# Patient Record
Sex: MALE | Race: BLACK OR AFRICAN AMERICAN | Employment: UNEMPLOYED | URBAN - METROPOLITAN AREA
[De-identification: names, ages, dates, MRNs, and addresses within clinical notes are randomized per-mention and may not be internally consistent; named-entity substitution may affect disease eponyms.]

---

## 2017-02-26 DIAGNOSIS — H40.9 GLAUCOMA OF BOTH EYES, UNSPECIFIED GLAUCOMA: ICD-10-CM

## 2017-02-27 RX ORDER — LATANOPROST 50 UG/ML
SOLUTION/ DROPS OPHTHALMIC
Qty: 2.5 ML | Refills: 1 | Status: SHIPPED | OUTPATIENT
Start: 2017-02-27 | End: 2017-05-30 | Stop reason: SDUPTHER

## 2017-03-06 ENCOUNTER — HOSPITAL ENCOUNTER (OUTPATIENT)
Dept: LAB | Age: 67
Discharge: HOME | End: 2017-03-06
Payer: MEDICARE

## 2017-03-06 ENCOUNTER — OFFICE VISIT (OUTPATIENT)
Dept: FAMILY MEDICINE CLINIC | Facility: CLINIC | Age: 67
End: 2017-03-06

## 2017-03-06 VITALS
HEART RATE: 89 BPM | HEIGHT: 67 IN | RESPIRATION RATE: 16 BRPM | DIASTOLIC BLOOD PRESSURE: 92 MMHG | OXYGEN SATURATION: 95 % | TEMPERATURE: 98 F | SYSTOLIC BLOOD PRESSURE: 130 MMHG | BODY MASS INDEX: 20.72 KG/M2 | WEIGHT: 132 LBS

## 2017-03-06 DIAGNOSIS — A74.9 CHLAMYDIA INFECTION: ICD-10-CM

## 2017-03-06 DIAGNOSIS — R30.0 DYSURIA: ICD-10-CM

## 2017-03-06 DIAGNOSIS — R30.0 DYSURIA: Primary | ICD-10-CM

## 2017-03-06 LAB
APPEARANCE UR: CLEAR
BACTERIA URNS QL MICRO: ABNORMAL /HPF
BILIRUB UR QL STRIP: NORMAL
BILIRUB UR QL: NEGATIVE
COLOR UR: YELLOW
EPITH CASTS URNS QL MICRO: ABNORMAL /LPF (ref 0–5)
GLUCOSE UR STRIP.AUTO-MCNC: NEGATIVE MG/DL
GLUCOSE UR-MCNC: NEGATIVE MG/DL
HGB UR QL STRIP: ABNORMAL
KETONES P FAST UR STRIP-MCNC: NORMAL MG/DL
KETONES UR QL STRIP.AUTO: 15 MG/DL
LEUKOCYTE ESTERASE UR QL STRIP.AUTO: ABNORMAL
NITRITE UR QL STRIP.AUTO: NEGATIVE
PH UR STRIP: 6 [PH] (ref 4.6–8)
PH UR STRIP: 6 [PH] (ref 5–8)
PROT UR QL STRIP: NEGATIVE MG/DL
PROT UR STRIP-MCNC: NEGATIVE MG/DL
RBC #/AREA URNS HPF: ABNORMAL /HPF (ref 0–5)
SP GR UR REFRACTOMETRY: 1.02 (ref 1–1.03)
SP GR UR STRIP: 1.01 (ref 1–1.03)
UA UROBILINOGEN AMB POC: NORMAL (ref 0.2–1)
URINALYSIS CLARITY POC: CLEAR
URINALYSIS COLOR POC: YELLOW
URINE BLOOD POC: NORMAL
URINE LEUKOCYTES POC: NEGATIVE
URINE NITRITES POC: NEGATIVE
UROBILINOGEN UR QL STRIP.AUTO: 1 EU/DL (ref 0.2–1)
WBC URNS QL MICRO: ABNORMAL /HPF (ref 0–4)

## 2017-03-06 PROCEDURE — 87491 CHLMYD TRACH DNA AMP PROBE: CPT | Performed by: FAMILY MEDICINE

## 2017-03-06 PROCEDURE — 81001 URINALYSIS AUTO W/SCOPE: CPT | Performed by: FAMILY MEDICINE

## 2017-03-06 PROCEDURE — 87086 URINE CULTURE/COLONY COUNT: CPT | Performed by: FAMILY MEDICINE

## 2017-03-06 RX ORDER — CIPROFLOXACIN 500 MG/1
500 TABLET ORAL 2 TIMES DAILY
Qty: 10 TAB | Refills: 0 | Status: SHIPPED | OUTPATIENT
Start: 2017-03-06 | End: 2017-03-08 | Stop reason: ALTCHOICE

## 2017-03-06 NOTE — MR AVS SNAPSHOT
Visit Information Date & Time Provider Department Dept. Phone Encounter #  
 3/6/2017  4:00 PM Wandy Ghotra MD [address] [place] [address] [id number] Follow-up Instructions Return if symptoms worsen or fail to improve. Your Appointments 7/6/2017 11:30 AM  
Office Visit with Wandy Ghotra MD  
[facility] ([facility]) Appt Note: Medicare Wellness    
 14 [address] [address]  
[phone]  
  
   
 [address] [place] [address] [place] Upcoming Health Maintenance Date Due DTaP/Tdap/Td series (1 - Tdap) 10/5/1969 FOBT Q 1 YEAR AGE 50-75 10/5/1998 ZOSTER VACCINE AGE 60> 10/5/2008 GLAUCOMA SCREENING Q2Y 10/5/2013 Pneumococcal 65+ Low/Medium Risk (2 of 2 - PCV13) 3/16/2016 INFLUENZA AGE 9 TO ADULT 8/1/2016 MEDICARE YEARLY EXAM 7/2/2017 Allergies as of 3/6/2017  Review Complete On: 3/6/2017 By: Ella Robb No Known Allergies Current Immunizations  Reviewed on 3/16/2015 Name Date Pneumococcal Polysaccharide (PPSV-23) 3/16/2015 11:03 AM  
  
 Not reviewed this visit You Were Diagnosed With   
  
 Codes Comments Dysuria    -  Primary ICD-10-CM: R30.0 ICD-9-CM: 123. 1 Vitals BP Pulse Temp Resp Height(growth percentile) Weight(growth percentile) (!) 130/92 89 98 °F (36.7 °C) 16 5' 7\" (1.702 m) 132 lb (59.9 kg) SpO2 BMI Smoking Status 95% 20.67 kg/m2 Former Smoker Vitals History BMI and BSA Data Body Mass Index Body Surface Area  
 20.67 kg/m 2 1.68 m 2 Preferred Pharmacy Pharmacy Name Phone [id number]. Odilon Singh, [address] [facility] [phone] Your Updated Medication List  
  
   
This list is accurate as of: 3/6/17  4:04 PM.  Always use your most recent med list.  
  
  
  
  
 acetaminophen 650 mg CR tablet Commonly known as:  TYLENOL Take 1 Tab by mouth three (3) times daily. carBAMazepine  mg SR tablet Commonly known as:  TEGretol XR Take 1 Tab by mouth two (2) times a day. ciprofloxacin HCl 500 mg tablet Commonly known as:  CIPRO Take 1 Tab by mouth two (2) times a day for 5 days. latanoprost 0.005 % ophthalmic solution Commonly known as:  XALATAN  
place 1 drop into both eyes NIGHTLY  
  
 pravastatin 40 mg tablet Commonly known as:  PRAVACHOL Take 1 Tab by mouth nightly. Prescriptions Sent to Pharmacy Refills  
 ciprofloxacin HCl (CIPRO) 500 mg tablet 0 Sig: Take 1 Tab by mouth two (2) times a day for 5 days. Class: Normal  
 Pharmacy: [place] [id number]. Boocarol Bude, [address] [facility] #: [phone] Route: Oral  
  
We Performed the Following AMB POC URINALYSIS DIP STICK AUTO W/O MICRO [82612 CPT(R)] Follow-up Instructions Return if symptoms worsen or fail to improve. To-Do List   
 03/06/2017 Lab:  CHLAMYDIA/NEISSERIA AMPLIFICATION   
  
 03/06/2017 Microbiology:  CULTURE, URINE   
  
 03/06/2017 Lab:  URINALYSIS W/ RFLX MICROSCOPIC Patient Instructions Painful Urination (Dysuria): Care Instructions Your Care Instructions Burning pain with urination (dysuria) is a common symptom of a urinary tract infection or other urinary problems. The bladder may become inflamed. This can cause pain when the bladder fills and empties. You may also feel pain if the tube that carries urine from the bladder to the outside of the body (urethra) gets irritated or infected. Sexually transmitted infections (STIs) also may cause pain when you urinate. Sometimes the pain can be caused by things other than an infection. The urethra can be irritated by soaps, perfumes, or foreign objects in the urethra. Kidney stones can cause pain when they pass through the urethra. The cause may be hard to find. You may need tests. Treatment for painful urination depends on the cause. Follow-up care is a key part of your treatment and safety. Be sure to make and go to all appointments, and call your doctor if you are having problems. It's also a good idea to know your test results and keep a list of the medicines you take. How can you care for yourself at home? · Drink extra water and juices such as cranberry and blueberry juices for the next day or two. This will help make the urine less concentrated. And it may help wash out any bacteria that may be causing an infection. (If you have kidney, heart, or liver disease and have to limit fluids, talk with your doctor before you increase the amount of fluids you drink.) · Avoid drinks that are carbonated or have caffeine. They can irritate the bladder. · Urinate often. Try to empty your bladder each time. For women: · Urinate right after you have sex. · After going to the bathroom, wipe from front to back. · Avoid douches, bubble baths, and feminine hygiene sprays. And avoid other feminine hygiene products that have deodorants. When should you call for help? Call your doctor now or seek immediate medical care if: 
· You have new symptoms, such as fever, nausea, or vomiting. · You have new or worse symptoms of a urinary problem. For example: ¨ You have blood or pus in your urine. ¨ You have chills or body aches. ¨ It hurts worse to urinate. ¨ You have groin or belly pain. ¨ You have pain in your back just below your rib cage (the flank area). Watch closely for changes in your health, and be sure to contact your doctor if you have any problems. Where can you learn more? Go to http://bradley-leilani.info/. Enter O337 in the search box to learn more about \"Painful Urination (Dysuria): Care Instructions. \" Current as of: August 12, 2016 Content Version: 11.1 © 0062-4704 [employer]. Care instructions adapted under license by [employer] (which disclaims liability or warranty for this information). If you have questions about a medical condition or this instruction, always ask your healthcare professional. [address] any warranty or liability for your use of this information. Introducing \[facility]\"" & HEALTH SERVICES! [facility] introduces Enel OGK-5 patient portal. Now you can access parts of your medical record, email your doctor's office, and request medication refills online. 1. In your internet browser, go to [email or web address] 2. Click on the First Time User? Click Here link in the Sign In box. You will see the New Member Sign Up page. 3. Enter your Enel OGK-5 Access Code exactly as it appears below. You will not need to use this code after youve completed the sign-up process. If you do not sign up before the expiration date, you must request a new code. · Enel OGK-5 Access Code: [email or web address] Expires: 6/4/2017  4:04 PM 
 
4. Enter the last four digits of your Social Security Number (xxxx) and Date of Birth (mm/dd/yyyy) as indicated and click Submit. You will be taken to the next sign-up page. 5. Create a Enel OGK-5 ID. This will be your Enel OGK-5 login ID and cannot be changed, so think of one that is secure and easy to remember. 6. Create a Enel OGK-5 password. You can change your password at any time. 7. Enter your Password Reset Question and Answer. This can be used at a later time if you forget your password. 8. Enter your e-mail address. You will receive e-mail notification when new information is available in [address] 9. Click Sign Up. You can now view and download portions of your medical record. 10. Click the Download Summary menu link to download a portable copy of your medical information.  
 
If you have questions, please visit the Frequently Asked Questions section of the Dinomarket. Remember, VenueSpothart is NOT to be used for urgent needs. For medical emergencies, dial 911. Now available from your iPhone and Android! Please provide this summary of care documentation to your next provider. Your primary care clinician is listed as Luis Manuel Valdez. If you have any questions after today's visit, please call [phone].

## 2017-03-06 NOTE — PROGRESS NOTES
Chief Complaint   Patient presents with    Urinary Burning     x 4 days       Subjective:     Randy Ruiz is a 74 y.o. male who complains of dysuria for 4 days. Patient denies flank pain, vomiting, fever, unusual vaginal discharge. Patient does not have a history of recurrent UTI. Patient does not have a history of pyelonephritis. No pain in testicles or penis. Patient Active Problem List   Diagnosis Code    Hx of post-polio syndrome Z86.12     Patient Active Problem List    Diagnosis Date Noted    Hx of post-polio syndrome 10/02/2014     Current Outpatient Prescriptions   Medication Sig Dispense Refill    ciprofloxacin HCl (CIPRO) 500 mg tablet Take 1 Tab by mouth two (2) times a day for 5 days. 10 Tab 0    latanoprost (XALATAN) 0.005 % ophthalmic solution place 1 drop into both eyes NIGHTLY 2.5 mL 1    pravastatin (PRAVACHOL) 40 mg tablet Take 1 Tab by mouth nightly. 30 Tab 11    carBAMazepine XR (TEGRETOL XR) 400 mg SR tablet Take 1 Tab by mouth two (2) times a day. 60 Tab 11    acetaminophen (TYLENOL) 650 mg CR tablet Take 1 Tab by mouth three (3) times daily. (Patient taking differently: Take 650 mg by mouth as needed.) 90 Tab 1     No Known Allergies  Past Medical History:   Diagnosis Date    History of poliomyelitis     Seizures ([address]      Past Surgical History:   Procedure Laterality Date    HX APPENDECTOMY       Family History   Problem Relation Age of Onset    Family history unknown: Yes     Social History   Substance Use Topics    Smoking status: Former Smoker    Smokeless tobacco: Never Used    Alcohol use No        Review of Systems  Pertinent items are noted in HPI.     Objective:     Visit Vitals    BP (!) 130/92    Pulse 89    Temp 98 °F (36.7 °C)    Resp 16    Ht 5' 7\" (1.702 m)    Wt 132 lb (59.9 kg)    SpO2 95%    BMI 20.67 kg/m2     General:  alert, cooperative, no distress   Abdomen: soft, nontender, nondistended, no masses or organomegaly  no rebound tenderness noted  bowel sounds normal  no CVA tenderness. Back:  CVA tenderness absent   :  penis exam: unremarkable     Laboratory:   Urine dipstick shows positive for RBC's, positive for urobilinogen and positive for ketones. Micro exam: not done. Assessment/Plan:       Jose L Sandoval was seen today for urinary burning. Diagnoses and all orders for this visit:    Dysuria  -     AMB POC URINALYSIS DIP STICK AUTO W/O MICRO  -     CULTURE, URINE; Future  -     CHLAMYDIA/NEISSERIA AMPLIFICATION ([facility] Only); Future  -     URINALYSIS W/ RFLX MICROSCOPIC; Future  -     ciprofloxacin HCl (CIPRO) 500 mg tablet; Take 1 Tab by mouth two (2) times a day for 5 days. I have discussed the diagnosis with the patient and the intended plan as seen in the above orders. The patient has received an after-visit summary and questions were answered concerning future plans. I have discussed medication side effects and warnings with the patient as well. I have reviewed the plan of care with the patient, accepted their input and they are in agreement with the treatment goals. Patient verbalizes understanding. Follow-up Disposition:  Return if symptoms worsen or fail to improve. Citlali Rajput

## 2017-03-06 NOTE — PATIENT INSTRUCTIONS
Painful Urination (Dysuria): Care Instructions  Your Care Instructions  Burning pain with urination (dysuria) is a common symptom of a urinary tract infection or other urinary problems. The bladder may become inflamed. This can cause pain when the bladder fills and empties. You may also feel pain if the tube that carries urine from the bladder to the outside of the body (urethra) gets irritated or infected. Sexually transmitted infections (STIs) also may cause pain when you urinate. Sometimes the pain can be caused by things other than an infection. The urethra can be irritated by soaps, perfumes, or foreign objects in the urethra. Kidney stones can cause pain when they pass through the urethra. The cause may be hard to find. You may need tests. Treatment for painful urination depends on the cause. Follow-up care is a key part of your treatment and safety. Be sure to make and go to all appointments, and call your doctor if you are having problems. It's also a good idea to know your test results and keep a list of the medicines you take. How can you care for yourself at home? · Drink extra water and juices such as cranberry and blueberry juices for the next day or two. This will help make the urine less concentrated. And it may help wash out any bacteria that may be causing an infection. (If you have kidney, heart, or liver disease and have to limit fluids, talk with your doctor before you increase the amount of fluids you drink.)  · Avoid drinks that are carbonated or have caffeine. They can irritate the bladder. · Urinate often. Try to empty your bladder each time. For women:  · Urinate right after you have sex. · After going to the bathroom, wipe from front to back. · Avoid douches, bubble baths, and feminine hygiene sprays. And avoid other feminine hygiene products that have deodorants. When should you call for help?   Call your doctor now or seek immediate medical care if:  · You have new symptoms, such as fever, nausea, or vomiting. · You have new or worse symptoms of a urinary problem. For example:  ¨ You have blood or pus in your urine. ¨ You have chills or body aches. ¨ It hurts worse to urinate. ¨ You have groin or belly pain. ¨ You have pain in your back just below your rib cage (the flank area). Watch closely for changes in your health, and be sure to contact your doctor if you have any problems. Where can you learn more? Go to http://bradley-leilani.info/. Enter E808 in the search box to learn more about \"Painful Urination (Dysuria): Care Instructions. \"  Current as of: August 12, 2016  Content Version: 11.1  © 4319-4143 [employer]. Care instructions adapted under license by [employer] (which disclaims liability or warranty for this information). If you have questions about a medical condition or this instruction, always ask your healthcare professional. [place] [address] any warranty or liability for your use of this information.

## 2017-03-07 LAB
C TRACH RRNA SPEC QL NAA+PROBE: POSITIVE
N GONORRHOEA RRNA SPEC QL NAA+PROBE: NEGATIVE
SPECIMEN SOURCE: ABNORMAL

## 2017-03-08 LAB
BACTERIA SPEC CULT: NORMAL
SERVICE CMNT-IMP: NORMAL

## 2017-03-08 RX ORDER — AZITHROMYCIN 500 MG/1
1000 TABLET, FILM COATED ORAL
Qty: 2 TAB | Refills: 0 | Status: SHIPPED | OUTPATIENT
Start: 2017-03-08 | End: 2017-03-08

## 2017-03-08 NOTE — PROGRESS NOTES
Called patient and discussed lab results. I will send medication for treatment for chlamydia.  Discussed to stop Cipro as urine culture negative

## 2017-03-23 ENCOUNTER — HOSPITAL ENCOUNTER (EMERGENCY)
Age: 67
Discharge: HOME | End: 2017-03-23
Attending: EMERGENCY MEDICINE
Payer: MEDICARE

## 2017-03-23 ENCOUNTER — APPOINTMENT (OUTPATIENT)
Dept: GENERAL RADIOLOGY | Age: 67
End: 2017-03-23
Attending: EMERGENCY MEDICINE
Payer: MEDICARE

## 2017-03-23 VITALS
OXYGEN SATURATION: 100 % | WEIGHT: 132 LBS | HEIGHT: 69 IN | BODY MASS INDEX: 19.55 KG/M2 | SYSTOLIC BLOOD PRESSURE: 147 MMHG | TEMPERATURE: 97.5 F | HEART RATE: 69 BPM | DIASTOLIC BLOOD PRESSURE: 86 MMHG | RESPIRATION RATE: 10 BRPM

## 2017-03-23 DIAGNOSIS — R07.9 CHEST PAIN, UNSPECIFIED TYPE: Primary | ICD-10-CM

## 2017-03-23 LAB
ANION GAP BLD CALC-SCNC: 6 MMOL/L (ref 3–18)
BASOPHILS # BLD AUTO: 0 K/UL (ref 0–0.1)
BASOPHILS # BLD: 0 % (ref 0–2)
BUN SERPL-MCNC: 12 MG/DL (ref 7–18)
BUN/CREAT SERPL: 14 (ref 12–20)
CALCIUM SERPL-MCNC: 8.9 MG/DL (ref 8.5–10.1)
CHLORIDE SERPL-SCNC: 103 MMOL/L (ref 100–108)
CK MB CFR SERPL CALC: NORMAL % (ref 0–4)
CK MB CFR SERPL CALC: NORMAL % (ref 0–4)
CK MB SERPL-MCNC: <1 NG/ML (ref 5–25)
CK MB SERPL-MCNC: <1 NG/ML (ref 5–25)
CK SERPL-CCNC: 109 U/L (ref 39–308)
CK SERPL-CCNC: 123 U/L (ref 39–308)
CO2 SERPL-SCNC: 33 MMOL/L (ref 21–32)
CREAT SERPL-MCNC: 0.84 MG/DL (ref 0.6–1.3)
DIFFERENTIAL METHOD BLD: ABNORMAL
EOSINOPHIL # BLD: 0 K/UL (ref 0–0.4)
EOSINOPHIL NFR BLD: 1 % (ref 0–5)
ERYTHROCYTE [DISTWIDTH] IN BLOOD BY AUTOMATED COUNT: 12.5 % (ref 11.6–14.5)
GLUCOSE SERPL-MCNC: 94 MG/DL (ref 74–99)
HCT VFR BLD AUTO: 39.9 % (ref 36–48)
HGB BLD-MCNC: 13.7 G/DL (ref 13–16)
LYMPHOCYTES # BLD AUTO: 39 % (ref 21–52)
LYMPHOCYTES # BLD: 2.5 K/UL (ref 0.9–3.6)
MCH RBC QN AUTO: 30.8 PG (ref 24–34)
MCHC RBC AUTO-ENTMCNC: 34.3 G/DL (ref 31–37)
MCV RBC AUTO: 89.7 FL (ref 74–97)
MONOCYTES # BLD: 0.2 K/UL (ref 0.05–1.2)
MONOCYTES NFR BLD AUTO: 4 % (ref 3–10)
NEUTS SEG # BLD: 3.8 K/UL (ref 1.8–8)
NEUTS SEG NFR BLD AUTO: 56 % (ref 40–73)
PLATELET # BLD AUTO: 206 K/UL (ref 135–420)
PMV BLD AUTO: 10.4 FL (ref 9.2–11.8)
POTASSIUM SERPL-SCNC: 3.8 MMOL/L (ref 3.5–5.5)
RBC # BLD AUTO: 4.45 M/UL (ref 4.7–5.5)
SODIUM SERPL-SCNC: 142 MMOL/L (ref 136–145)
TROPONIN I SERPL-MCNC: <0.02 NG/ML (ref 0–0.04)
TROPONIN I SERPL-MCNC: <0.02 NG/ML (ref 0–0.04)
WBC # BLD AUTO: 6.6 K/UL (ref 4.6–13.2)

## 2017-03-23 PROCEDURE — 85025 COMPLETE CBC W/AUTO DIFF WBC: CPT | Performed by: PHYSICIAN ASSISTANT

## 2017-03-23 PROCEDURE — 99285 EMERGENCY DEPT VISIT HI MDM: CPT

## 2017-03-23 PROCEDURE — 71020 XR CHEST PA LAT: CPT

## 2017-03-23 PROCEDURE — 80048 BASIC METABOLIC PNL TOTAL CA: CPT | Performed by: PHYSICIAN ASSISTANT

## 2017-03-23 PROCEDURE — 93005 ELECTROCARDIOGRAM TRACING: CPT

## 2017-03-23 PROCEDURE — 82550 ASSAY OF CK (CPK): CPT | Performed by: PHYSICIAN ASSISTANT

## 2017-03-23 NOTE — ED PROVIDER NOTES
HPI Comments: 7:48 PM Sierra Voss is a 74 y.o. male with a history of seizures presenting to the ED with intermittent L sided CP that began at 5am this morning. He states the pain would lasts for a few minutes per episode and radiate to the shoulder then return to the chest. He states the pain was relieved while coughing. He also reports rhinorrhea as an associated symptom. He states he has never had these symptoms before and denies any recent trauma, injury, or heavy lifting. Pt also denies SOB, nausea, vomiting,abd pain, diaphoresis, chills, and cough. He is a former tobacco user. No other complaints at this time. The history is provided by the patient. Past Medical History:   Diagnosis Date    History of poliomyelitis     Seizures ([facility] [address]        Past Surgical History:   Procedure Laterality Date    HX APPENDECTOMY           Family History:   Problem Relation Age of Onset    Family history unknown: Yes       Social History     Social History    Marital status: UNKNOWN     Spouse name: N/A    Number of children: N/A    Years of education: N/A     Occupational History    Not on file. Social History Main Topics    Smoking status: Former Smoker    Smokeless tobacco: Never Used    Alcohol use No    Drug use: No    Sexual activity: Yes     Partners: Female     Other Topics Concern    Not on file     Social History Narrative         ALLERGIES: Review of patient's allergies indicates no known allergies. Review of Systems   Constitutional: Negative for chills and fever. HENT: Positive for rhinorrhea. Negative for congestion and sneezing. Eyes: Negative for visual disturbance. Respiratory: Negative for cough and shortness of breath. Cardiovascular: Positive for chest pain. Gastrointestinal: Negative for abdominal pain, nausea and vomiting. Genitourinary: Negative for difficulty urinating and dysuria. Musculoskeletal: Negative for back pain.    Skin: Negative for rash.   Neurological: Negative for weakness and headaches. Vitals:    03/23/17 2130 03/23/17 2200 03/23/17 2230 03/23/17 2300   BP: 133/73 129/88 132/75 147/86   Pulse: 71 76 72 69   Resp: 14 17 13 10   Temp:       SpO2: 100% 95% 96% 97%   Weight:       Height:                Physical Exam   Constitutional: He is oriented to person, place, and time. He appears well-developed and well-nourished. HENT:   Head: Normocephalic and atraumatic. Neck: Neck supple. No JVD present. Cardiovascular: Normal rate and regular rhythm. Pulmonary/Chest: Effort normal and breath sounds normal. No respiratory distress. He exhibits no tenderness. Abdominal: Soft. He exhibits no distension. There is no tenderness. There is no rebound and no guarding. Musculoskeletal: He exhibits no edema. No calf tenderness   Neurological: He is alert and oriented to person, place, and time. Skin: Skin is warm and dry. No erythema. Psychiatric: Judgment normal.        MDM  Number of Diagnoses or Management Options  Chest pain, unspecified type:   Elevated BP:   Diagnosis management comments: 73 y/o male presents with intermittent episodes of chest pain  Not pleuritic, no PE risk factors  Not into back, not consistent with dissection  Check ekg, trop x2. Low risk heart.         Amount and/or Complexity of Data Reviewed  Clinical lab tests: ordered and reviewed  Tests in the radiology section of CPT®: ordered and reviewed  Tests in the medicine section of CPT®: reviewed and ordered      ED Course       Procedures          Vitals:  Patient Vitals for the past 12 hrs:   Temp Pulse Resp BP SpO2   03/23/17 2300 - 69 10 147/86 97 %   03/23/17 2230 - 72 13 132/75 96 %   03/23/17 2200 - 76 17 129/88 95 %   03/23/17 2130 - 71 14 133/73 100 %   03/23/17 2100 - 79 22 - 98 %   03/23/17 2030 - 70 17 143/89 99 %   03/23/17 2000 - 69 16 (!) 150/111 97 %   03/23/17 1945 - 72 15 159/80 99 %   03/23/17 1618 98 °F (36.7 °C) 74 17 (!) 154/95 99 % Pulsox interpreted within normal limits. Medications ordered:   Medications - No data to display      Lab findings:  Recent Results (from the past 12 hour(s))   EKG, 12 LEAD, INITIAL    Collection Time: 03/23/17  5:09 PM   Result Value Ref Range    Ventricular Rate 70 BPM    Atrial Rate 70 BPM    P-R Interval 158 ms    QRS Duration 76 ms    Q-T Interval 360 ms    QTC Calculation (Bezet) 388 ms    Calculated P Axis 80 degrees    Calculated R Axis -22 degrees    Calculated T Axis 61 degrees    Diagnosis       Normal sinus rhythm  Normal ECG  No previous ECGs available     CBC WITH AUTOMATED DIFF    Collection Time: 03/23/17  6:44 PM   Result Value Ref Range    WBC 6.6 4.6 - 13.2 K/uL    RBC 4.45 (L) 4.70 - 5.50 M/uL    HGB 13.7 13.0 - 16.0 g/dL    HCT 39.9 36.0 - 48.0 %    MCV 89.7 74.0 - 97.0 FL    MCH 30.8 24.0 - 34.0 PG    MCHC 34.3 31.0 - 37.0 g/dL    RDW 12.5 11.6 - 14.5 %    PLATELET 722 989 - 882 K/uL    MPV 10.4 9.2 - 11.8 FL    NEUTROPHILS 56 40 - 73 %    LYMPHOCYTES 39 21 - 52 %    MONOCYTES 4 3 - 10 %    EOSINOPHILS 1 0 - 5 %    BASOPHILS 0 0 - 2 %    ABS. NEUTROPHILS 3.8 1.8 - 8.0 K/UL    ABS. LYMPHOCYTES 2.5 0.9 - 3.6 K/UL    ABS. MONOCYTES 0.2 0.05 - 1.2 K/UL    ABS. EOSINOPHILS 0.0 0.0 - 0.4 K/UL    ABS.  BASOPHILS 0.0 0.0 - 0.1 K/UL    DF AUTOMATED     METABOLIC PANEL, BASIC    Collection Time: 03/23/17  6:44 PM   Result Value Ref Range    Sodium 142 136 - 145 mmol/L    Potassium 3.8 3.5 - 5.5 mmol/L    Chloride 103 100 - 108 mmol/L    CO2 33 (H) 21 - 32 mmol/L    Anion gap 6 3.0 - 18 mmol/L    Glucose 94 74 - 99 mg/dL    BUN 12 7.0 - 18 MG/DL    Creatinine 0.84 0.6 - 1.3 MG/DL    BUN/Creatinine ratio 14 12 - 20      GFR est AA >60 >60 ml/min/1.73m2    GFR est non-AA >60 >60 ml/min/1.73m2    Calcium 8.9 8.5 - 10.1 MG/DL   CARDIAC PANEL,(CK, CKMB & TROPONIN)    Collection Time: 03/23/17  6:44 PM   Result Value Ref Range     39 - 308 U/L    CK - MB <1.0 <3.6 ng/ml    CK-MB Index Cannot be calulated 0.0 - 4.0 %    Troponin-I, Qt. <0.02 0.0 - 0.045 NG/ML   EKG, 12 LEAD, SUBSEQUENT    Collection Time: 17  9:06 PM   Result Value Ref Range    Ventricular Rate 64 BPM    Atrial Rate 64 BPM    P-R Interval 164 ms    QRS Duration 80 ms    Q-T Interval 382 ms    QTC Calculation (Bezet) 394 ms    Calculated P Axis 74 degrees    Calculated R Axis -21 degrees    Calculated T Axis 49 degrees    Diagnosis       Normal sinus rhythm  Normal ECG  When compared with ECG of 23-MAR-2017 17:09,  No significant change was found     CARDIAC PANEL,(CK, CKMB & TROPONIN)    Collection Time: 17 10:41 PM   Result Value Ref Range     39 - 308 U/L    CK - MB <1.0 <3.6 ng/ml    CK-MB Index Cannot be calulated 0.0 - 4.0 %    Troponin-I, Qt. <0.02 0.0 - 0.045 NG/ML       EKG interpretation by ED Physician:  EK, rate 70, normal axis, NSR, normal intervals, no ST changes  EK, rate 64, normal axis, NSR, normal intervals, no ST changes    X-Ray, CT or other radiology findings or impressions:  XR CHEST PA LAT      No acute process  Per Gaurang Wang DO           Progress notes, Consult notes or additional Procedure notes:   11:18 PM Pt reevaluated at this time and is resting comfortably in NAD. Discussed results and findings, as well as, diagnosis and plan for discharge. Pt verbalizes understanding and agreement with plan. All questions addressed at this time. Outpatient stress test ordered. Pt noted to have elevated BP. Pt is asymptomatic and was referred to PCP for follow up. Disposition:  Diagnosis:   1. Chest pain, unspecified type    2.  Elevated BP        Disposition: discharged    Follow-up Information     Follow up With Details Comments Patsy Castro MD Call in 2 days  [address] [place]      [facility] EMERGENCY DEPT  As needed, If symptoms worsen [address] [place] [id number]  [phone]           Patient's Medications   Start Taking    No medications on file   Continue Taking    ACETAMINOPHEN (TYLENOL) 650 MG CR TABLET    Take 1 Tab by mouth three (3) times daily. CARBAMAZEPINE XR (TEGRETOL XR) 400 MG SR TABLET    Take 1 Tab by mouth two (2) times a day. LATANOPROST (XALATAN) 0.005 % OPHTHALMIC SOLUTION    place 1 drop into both eyes NIGHTLY    PRAVASTATIN (PRAVACHOL) 40 MG TABLET    Take 1 Tab by mouth nightly. These Medications have changed    No medications on file   Stop Taking    No medications on file       SCRIBE ATTESTATION STATEMENT  Documented by: Rafa danielleibing for, and in the presence [facility] Patrick Hernandez DO      Signed by: Melo Mg, 03/23/17, 7:48 PM    PROVIDER ATTESTATION STATEMENT  I personally performed the services described in the documentation, reviewed the documentation, as recorded by the scribe in my presence, and it accurately and completely records my words and actions.   Mariah Salas DO

## 2017-03-23 NOTE — ED NOTES
Initial assessment, patient sitting up on stretcher. C/o left shoulder pain 9/10 radiating to left chest, intermittent cramping feeling. PMH Seizure Rx Tegretol UDT dosage or determine last sz activity.

## 2017-03-23 NOTE — ED NOTES
CP which started this morning. No h/o cardiac problems. Took ASA PTA. I performed a brief evaluation, including history and physical, of the patient here in triage and I have determined that pt will need further treatment and evaluation from the main side ER physician. I have placed initial orders to help in expediting patients care.      March 23, 2017 at 4:20 PM - Saad Richardson PA-C        Visit Vitals    BP (!) 154/95 (BP 1 Location: Left arm, BP Patient Position: At rest)    Pulse 74    Temp 98 °F (36.7 °C)    Resp 17    Ht 5' 9\" (1.753 m)    Wt 59.9 kg (132 lb)    SpO2 99%    BMI 19.49 kg/m2

## 2017-03-24 LAB
ATRIAL RATE: 70 BPM
CALCULATED P AXIS, ECG09: 80 DEGREES
CALCULATED R AXIS, ECG10: -22 DEGREES
CALCULATED T AXIS, ECG11: 61 DEGREES
DIAGNOSIS, 93000: NORMAL
P-R INTERVAL, ECG05: 158 MS
Q-T INTERVAL, ECG07: 360 MS
QRS DURATION, ECG06: 76 MS
QTC CALCULATION (BEZET), ECG08: 388 MS
VENTRICULAR RATE, ECG03: 70 BPM

## 2017-03-24 NOTE — DISCHARGE INSTRUCTIONS
Chest Pain: Care Instructions  Your Care Instructions  There are many things that can cause chest pain. Some are not serious and will get better on their own in a few days. But some kinds of chest pain need more testing and treatment. Your doctor may have recommended a follow-up visit in the next 8 to 12 hours. If you are not getting better, you may need more tests or treatment. Even though your doctor has released you, you still need to watch for any problems. The doctor carefully checked you, but sometimes problems can develop later. If you have new symptoms or if your symptoms do not get better, get medical care right away. If you have worse or different chest pain or pressure that lasts more than 5 minutes or you passed out (lost consciousness), call 911 or seek other emergency help right away. A medical visit is only one step in your treatment. Even if you feel better, you still need to do what your doctor recommends, such as going to all suggested follow-up appointments and taking medicines exactly as directed. This will help you recover and help prevent future problems. How can you care for yourself at home? · Rest until you feel better. · Take your medicine exactly as prescribed. Call your doctor if you think you are having a problem with your medicine. · Do not drive after taking a prescription pain medicine. When should you call for help? Call 911 if:  · You passed out (lost consciousness). · You have severe difficulty breathing. · You have symptoms of a heart attack. These may include:  ¨ Chest pain or pressure, or a strange feeling in your chest.  ¨ Sweating. ¨ Shortness of breath. ¨ Nausea or vomiting. ¨ Pain, pressure, or a strange feeling in your back, neck, jaw, or upper belly or in one or both shoulders or arms. ¨ Lightheadedness or sudden weakness. ¨ A fast or irregular heartbeat.   After you call 911, the  may tell you to chew 1 adult-strength or 2 to 4 low-dose aspirin. Wait for an ambulance. Do not try to drive yourself. Call your doctor today if:  · You have any trouble breathing. · Your chest pain gets worse. · You are dizzy or lightheaded, or you feel like you may faint. · You are not getting better as expected. · You are having new or different chest pain. Where can you learn more? Go to http://bradley-leilani.info/. Enter A120 in the search box to learn more about \"Chest Pain: Care Instructions. \"  Current as of: May 27, 2016  Content Version: 11.1  © 8003-1819 [employer]. Care instructions adapted under license by [employer] (which disclaims liability or warranty for this information). If you have questions about a medical condition or this instruction, always ask your healthcare professional. [address] any warranty or liability for your use of this information. Learning About High Blood Pressure  What is high blood pressure? Blood pressure is a measure of how hard the blood pushes against the walls of your arteries. It's normal for blood pressure to go up and down throughout the day, but if it stays up, you have high blood pressure. Another name for high blood pressure is hypertension. Two numbers tell you your blood pressure. The first number is the systolic pressure. It shows how hard the blood pushes when your heart is pumping. The second number is the diastolic pressure. It shows how hard the blood pushes between heartbeats, when your heart is relaxed and filling with blood. A blood pressure of less than 120/80 (say \"120 over 80\") is ideal for an adult. High blood pressure is 140/90 or higher. You have high blood pressure if your top number is 140 or higher or your bottom number is 90 or higher, or both. Many people fall into the category in between, called prehypertension.  People with prehypertension need to make lifestyle changes to bring their blood pressure down and help prevent or delay high blood pressure. What happens when you have high blood pressure? · Blood flows through your arteries with too much force. Over time, this damages the walls of your arteries. But you can't feel it. High blood pressure usually doesn't cause symptoms. · Fat and calcium start to build up in your arteries. This buildup is called plaque. Plaque makes your arteries narrower and stiffer. Blood can't flow through them as easily. · This lack of good blood flow starts to damage some of the organs in your body. This can lead to problems such as coronary artery disease and heart attack, heart failure, stroke, kidney failure, and eye damage. How can you prevent high blood pressure? · Stay at a healthy weight. · Try to limit how much sodium you eat to less than 2,300 milligrams (mg) a day. If you limit your sodium to 1,500 mg a day, you can lower your blood pressure even more. ¨ Buy foods that are labeled \"unsalted,\" \"sodium-free,\" or \"low-sodium. \" Foods labeled \"reduced-sodium\" and \"light sodium\" may still have too much sodium. ¨ Flavor your food with garlic, lemon juice, onion, vinegar, herbs, and spices instead of salt. Do not use soy sauce, steak sauce, onion salt, garlic salt, mustard, or ketchup on your food. ¨ Use less salt (or none) when recipes call for it. You can often use half the salt a recipe calls for without losing flavor. · Be physically active. Get at least 30 minutes of exercise on most days of the week. Walking is a good choice. You also may want to do other activities, such as running, swimming, cycling, or playing tennis or team sports. · Limit alcohol to 2 drinks a day for men and 1 drink a day for women. · Eat plenty of fruits, vegetables, and low-fat dairy products. Eat less saturated and total fats. How is high blood pressure treated? · Your doctor will suggest making lifestyle changes.  For example, your doctor may ask you to eat healthy foods, quit smoking, lose extra weight, and be more active. · If lifestyle changes don't help enough or your blood pressure is very high, you will have to take medicine every day. Follow-up care is a key part of your treatment and safety. Be sure to make and go to all appointments, and call your doctor if you are having problems. It's also a good idea to know your test results and keep a list of the medicines you take. Where can you learn more? Go to http://bradley-leilani.info/. Enter P501 in the search box to learn more about \"Learning About High Blood Pressure. \"  Current as of: March 23, 2016  Content Version: 11.1  © 4744-4971 [employer]. Care instructions adapted under license by [employer] (which disclaims liability or warranty for this information). If you have questions about a medical condition or this instruction, always ask your healthcare professional. [address] any warranty or liability for your use of this information.

## 2017-03-25 LAB
ATRIAL RATE: 64 BPM
CALCULATED P AXIS, ECG09: 74 DEGREES
CALCULATED R AXIS, ECG10: -21 DEGREES
CALCULATED T AXIS, ECG11: 49 DEGREES
DIAGNOSIS, 93000: NORMAL
P-R INTERVAL, ECG05: 164 MS
Q-T INTERVAL, ECG07: 382 MS
QRS DURATION, ECG06: 80 MS
QTC CALCULATION (BEZET), ECG08: 394 MS
VENTRICULAR RATE, ECG03: 64 BPM

## 2017-05-30 DIAGNOSIS — H40.9 GLAUCOMA OF BOTH EYES, UNSPECIFIED GLAUCOMA: ICD-10-CM

## 2017-05-30 NOTE — TELEPHONE ENCOUNTER
Patient's last office visit on 03/06/2017  Medication(s) last filled on 02/27/17  Next Appointment: 07/06/2017  Rx Class Print

## 2017-05-30 NOTE — TELEPHONE ENCOUNTER
Requested Prescriptions     Pending Prescriptions Disp Refills    latanoprost (XALATAN) 0.005 % ophthalmic solution 2.5 mL 1

## 2017-05-31 RX ORDER — LATANOPROST 50 UG/ML
SOLUTION/ DROPS OPHTHALMIC
Qty: 2.5 ML | Refills: 1 | Status: SHIPPED | OUTPATIENT
Start: 2017-05-31 | End: 2017-07-29 | Stop reason: SDUPTHER

## 2017-07-12 ENCOUNTER — OFFICE VISIT (OUTPATIENT)
Dept: FAMILY MEDICINE CLINIC | Facility: CLINIC | Age: 67
End: 2017-07-12

## 2017-07-12 ENCOUNTER — HOSPITAL ENCOUNTER (OUTPATIENT)
Dept: LAB | Age: 67
Discharge: HOME | End: 2017-07-12
Payer: MEDICARE

## 2017-07-12 VITALS
SYSTOLIC BLOOD PRESSURE: 139 MMHG | WEIGHT: 124 LBS | RESPIRATION RATE: 16 BRPM | HEIGHT: 69 IN | DIASTOLIC BLOOD PRESSURE: 85 MMHG | TEMPERATURE: 97.4 F | HEART RATE: 76 BPM | BODY MASS INDEX: 18.37 KG/M2 | OXYGEN SATURATION: 98 %

## 2017-07-12 DIAGNOSIS — G80.8 CONGENITAL HEMIPARESIS (HCC): ICD-10-CM

## 2017-07-12 DIAGNOSIS — M25.511 CHRONIC RIGHT SHOULDER PAIN: ICD-10-CM

## 2017-07-12 DIAGNOSIS — Z86.19 HX OF CHLAMYDIA INFECTION: ICD-10-CM

## 2017-07-12 DIAGNOSIS — Z12.11 SCREEN FOR COLON CANCER: ICD-10-CM

## 2017-07-12 DIAGNOSIS — Z71.89 ACP (ADVANCE CARE PLANNING): ICD-10-CM

## 2017-07-12 DIAGNOSIS — Z13.31 SCREENING FOR DEPRESSION: ICD-10-CM

## 2017-07-12 DIAGNOSIS — Z13.39 SCREENING FOR ALCOHOLISM: ICD-10-CM

## 2017-07-12 DIAGNOSIS — Z00.00 ROUTINE GENERAL MEDICAL EXAMINATION AT A HEALTH CARE FACILITY: Primary | ICD-10-CM

## 2017-07-12 DIAGNOSIS — G89.29 CHRONIC RIGHT SHOULDER PAIN: ICD-10-CM

## 2017-07-12 DIAGNOSIS — G40.119 PARTIAL SYMPTOMATIC EPILEPSY WITH SIMPLE PARTIAL SEIZURES, INTRACTABLE, WITHOUT STATUS EPILEPTICUS (HCC): ICD-10-CM

## 2017-07-12 PROBLEM — G40.909 EPILEPSY (HCC): Status: ACTIVE | Noted: 2017-07-12

## 2017-07-12 PROCEDURE — 87491 CHLMYD TRACH DNA AMP PROBE: CPT | Performed by: FAMILY MEDICINE

## 2017-07-12 RX ORDER — DEXTROMETHORPHAN HYDROBROMIDE, GUAIFENESIN 5; 100 MG/5ML; MG/5ML
650 LIQUID ORAL AS NEEDED
Qty: 90 TAB | Refills: 1 | Status: ON HOLD | OUTPATIENT
Start: 2017-07-12 | End: 2019-07-23 | Stop reason: SDUPTHER

## 2017-07-12 NOTE — PROGRESS NOTES
This is a Subsequent Medicare Annual Wellness Visit providing Personalized Prevention Plan Services (PPPS) (Performed 12 months after initial AWV and PPPS )    I have reviewed the patient's medical history in detail and updated the computerized patient record. History     Past Medical History:   Diagnosis Date    History of poliomyelitis     [facility]       Past Surgical History:   Procedure Laterality Date    HX APPENDECTOMY       Current Outpatient Prescriptions   Medication Sig Dispense Refill    acetaminophen (TYLENOL) 650 mg CR tablet Take 1 Tab by mouth as needed. 90 Tab 1    latanoprost (XALATAN) 0.005 % ophthalmic solution place 1 drop into both eyes NIGHTLY 2.5 mL 1    carBAMazepine XR (TEGRETOL XR) 400 mg SR tablet Take 1 Tab by mouth two (2) times a day. 60 Tab 11    pravastatin (PRAVACHOL) 40 mg tablet Take 1 Tab by mouth nightly. 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 Seizure again associated with stress and dehydration. Pt continues to take meds as prescribed. History of congenital right hemiparesis with symptomatic dystonia--  with right shoulder symptoms    Depression Risk Factor Screening:     PHQ over the last two weeks 7/12/2017   Little interest or pleasure in doing things Not at all   Feeling down, depressed or hopeless Not at all   Total Score PHQ 2 0     Alcohol Risk Factor Screening: On any occasion during the past 3 months, have you had more than 4 drinks containing alcohol?   No    Do you average more than 14 drinks per week? No        Functional Ability and Level of Safety:     Hearing Loss   mild    Activities of Daily Living   Self-care. Requires assistance with: no ADLs    Fall Risk   Fall Risk Assessment, last 12 mths 7/12/2017   Able to walk? Yes   Fall in past 12 months? No   Fall with injury? -   Number of falls in past 12 months -   Fall Risk Score -     Abuse Screen   Patient is not abused    Review of Systems   A comprehensive review of systems was negative except for that written in the HPI. Physical Examination     Evaluation of Cognitive Function:  Mood/affect:  happy  Appearance: age appropriate and casually dressed  Family member/caregiver input: Josselyn Sanders    Visit Vitals    /85    Pulse 76    Temp 97.4 °F (36.3 °C)    Resp 16    Ht 5' 9\" (1.753 m)    Wt 124 lb (56.2 kg)    SpO2 98%    BMI 18.31 kg/m2     General appearance: alert, cooperative, no distress, appears stated age  Lungs: clear to auscultation bilaterally  Heart: regular rate and rhythm, S1, S2 normal  Abdomen: soft, non-tender. Bowel sounds normal. No masses,  no organomegaly    Patient Care Team:  Roberto Christina MD as PCP - General (Family Practice)    Advice/Referrals/Counseling   Education and counseling provided:  Are appropriate based on today's review and evaluation  End-of-Life planning (with patient's consent)  Pneumococcal Vaccine  Prostate cancer screening tests (PSA, covered annually)  Colorectal cancer screening tests  Screening for glaucoma      Assessment/Plan   Hailey Mariscal was seen today for annual wellness visit. Diagnoses and all orders for this visit:    Routine general medical examination at a health care facility    Screening for alcoholism    Screening for depression  -     Depression Screen Annual    Screen for colon cancer  -     OCCULT BLOOD, IMMUNOASSAY (FIT) (91511); Future    ACP (advance care planning)    Patient declined all immunizations.    Patient given [facility] information. ACP information given  Patient does not want any lab work done. He tells me he has a fear of needles. I have discussed the diagnosis with the patient and the intended plan as seen in the above orders. The patient has received an after-visit summary and questions were answered concerning future plans. I have discussed medication side effects and warnings with the patient as well. I have reviewed the plan of care with the patient, accepted their input and they are in agreement with the treatment goals. Patient verbalizes understanding. Follow-up Disposition:  Return in about 1 year (around 7/12/2018), or if symptoms worsen or fail to improve, for Medicare wellness. Orlando Monterroso

## 2017-07-12 NOTE — PROGRESS NOTES
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 Seizure again associated with stress and dehydration. Pt continues to take meds as prescribed. History of congenital right hemiparesis with symptomatic dystonia--  with right shoulder symptoms, he states mainly shoulder jumping not necessarily pain. Hx of shoulder bursitis. Patient Active Problem List   Diagnosis Code    Epilepsy ([facility] G40.909    Congenital hemiparesis ([facility] G80.8       Review of Systems   Complete ROS negative except where noted in HPI    Objective:     Visit Vitals    /85    Pulse 76    Temp 97.4 °F (36.3 °C)    Resp 16    Ht 5' 9\" (1.753 m)    Wt 124 lb (56.2 kg)    SpO2 98%    BMI 18.31 kg/m2     No exam data present    Constitutional: NAD, A & O x 3  Lungs: CTAB  Cardiovascular: RRR  Neuro: right hemiparesis, with no significant changes  Shoulder exam:  Right shoulder: No erythema, edema, or bruising. Nontender to acromioclavicular joint. Tender to subacromial bursa. POS impingment signs  Left shoulder: No erythema, edema, or bruising. Nontender to acromioclavicular joint. Nontender to subacromial bursa. No impingement signs. Karey Mckeon was seen today for annual wellness visit. Diagnoses and all orders for this visit:    Routine general medical examination at a health care facility    Screening for alcoholism    Screening for depression  -     Depression Screen Annual    Screen for colon cancer  -     OCCULT BLOOD, IMMUNOASSAY (FIT) (83834); Future    ACP (advance care planning)    Chronic right shoulder pain  -     acetaminophen (TYLENOL) 650 mg CR tablet; Take 1 Tab by mouth as needed. -     XR SHOULDER RT AP/LAT MIN 2 V; Future    Hx of chlamydia infection  -     CHLAMYDIA/NEISSERIA AMPLIFICATION ([facility] Only);  Future    Partial symptomatic epilepsy with simple partial seizures, intractable, without status epilepticus (HCC)    Congenital hemiparesis (HCC)  -     XR SHOULDER RT AP/LAT MIN 2 V; Future    I have reviewed [facility] notes where patient was seeing Neurology in past for this complaint. Unfortunately patient not interested in any medication except for tylenol prn. He is not interested in PT. JULIAN done today for STD treatment prior    I have discussed the diagnosis with the patient and the intended plan as seen in the above orders. The patient has received an after-visit summary and questions were answered concerning future plans. I have discussed medication side effects and warnings with the patient as well. I have reviewed the plan of care with the patient, accepted their input and they are in agreement with the treatment goals. Patient verbalizes understanding. Follow-up Disposition:  Return in about 1 year (around 7/12/2018), or if symptoms worsen or fail to improve, for Medicare wellness.

## 2017-07-12 NOTE — PATIENT INSTRUCTIONS
Medicare Wellness Visit, Male    The best way to live healthy is to have a healthy lifestyle by eating a well-balanced diet, exercising regularly, limiting alcohol and stopping smoking. Regular physical exams and screening tests are another way to keep healthy. Preventive exams provided by your health care provider can find health problems before they become diseases or illnesses. Preventive services including immunizations, screening tests, monitoring and exams can help you take care of your own health. All people over age 72 should have a pneumovax  and and a prevnar shot to prevent pneumonia. These are once in a lifetime unless you and your provider decide differently. All people over 65 should have a yearly flu shot and a tetanus vaccine every 10 years. Screening for diabetes mellitus with a blood sugar test should be done every year. Glaucoma is a disease of the eye due to increased ocular pressure that can lead to blindness and it should be done every year by an eye professional.    Cardiovascular screening tests that check for elevated lipids (fatty part of blood) which can lead to heart disease and strokes should be done every 5 years. Colorectal screening that evaluates for blood or polyps in your colon should be done yearly as a stool test or every five years as a flexible sigmoidoscope or every 10 years as a colonoscopy up to age 76. Men up to age 76 may need a screening blood test for prostate cancer at certain intervals, depending on their personal and family history. This decision is between the patient and his provider. If you have been a smoker or had family history of abdominal aortic aneurysms, you and your provider may decide to schedule an ultrasound test of your aorta. Hepatitis C screening is also recommended for anyone born between [birth year] through [address]. A shingles vaccine is also recommended once in a lifetime after age 61.     Your Medicare Wellness Exam is recommended annually. Here is a list of your current Health Maintenance items with a due date:  Health Maintenance Due   Topic Date Due    DTaP/Tdap/Td  (1 - Tdap) 10/05/1969    Stool testing for trace blood  10/05/1998    Shingles Vaccine  10/05/2008    Glaucoma Screening   10/05/2013    Pneumococcal Vaccine (2 of 2 - PCV13) 03/16/2016    Annual Well Visit  07/02/2017            Heart-Healthy Diet: Care Instructions  Your Care Instructions    A heart-healthy diet has lots of vegetables, fruits, nuts, beans, and whole grains, and is low in salt. It limits foods that are high in saturated fat, such as meats, cheeses, and fried foods. It may be hard to change your diet, but even small changes can lower your risk of heart attack and heart disease. Follow-up care is a key part of your treatment and safety. Be sure to make and go to all appointments, and call your doctor if you are having problems. It's also a good idea to know your test results and keep a list of the medicines you take. How can you care for yourself at home? Watch your portions  · Learn what a serving is. A \"serving\" and a \"portion\" are not always the same thing. Make sure that you are not eating larger portions than are recommended. For example, a serving of pasta is ½ cup. A serving size of meat is 2 to 3 ounces. A 3-ounce serving is about the size of a deck of cards. Measure serving sizes until you are good at [place]" them. Keep in mind that restaurants often serve portions that are 2 or 3 times the size of one serving. · To keep your energy level up and keep you from feeling hungry, eat often but in smaller portions. · Eat only the number of calories you need to stay at a healthy weight. If you need to lose weight, eat fewer calories than your body burns (through exercise and other physical activity). Eat more fruits and vegetables  · Eat a variety of fruit and vegetables every day.  Dark green, deep orange, red, or yellow fruits and vegetables are especially good for you. Examples include spinach, carrots, peaches, and berries. · Keep carrots, celery, and other veggies handy for snacks. Buy fruit that is in season and store it where you can see it so that you will be tempted to eat it. · Cook dishes that have a lot of veggies in them, such as stir-fries and soups. Limit saturated and trans fat  · Read food labels, and try to avoid saturated and trans fats. They increase your risk of heart disease. Trans fat is found in many processed foods such as cookies and crackers. · Use olive or canola oil when you cook. Try cholesterol-lowering spreads, such as Benecol or Take Control. · Bake, broil, grill, or steam foods instead of frying them. · Choose lean meats instead of high-fat meats such as hot dogs and sausages. Cut off all visible fat when you prepare meat. · Eat fish, skinless poultry, and meat alternatives such as soy products instead of high-fat meats. Soy products, such as tofu, may be especially good for your heart. · Choose low-fat or fat-free milk and dairy products. Eat fish  · Eat at least two servings of fish a week. Certain fish, such as salmon and tuna, contain omega-3 fatty acids, which may help reduce your risk of heart attack. Eat foods high in fiber  · Eat a variety of grain products every day. Include whole-grain foods that have lots of fiber and nutrients. Examples of whole-grain foods include oats, whole wheat bread, and brown rice. · Buy whole-grain breads and cereals, instead of white bread or pastries. Limit salt and sodium  · Limit how much salt and sodium you eat to help lower your blood pressure. · Taste food before you salt it. Add only a little salt when you think you need it. With time, your taste buds will adjust to less salt. · Eat fewer snack items, fast foods, and other high-salt, processed foods. Check food labels for the amount of sodium in packaged foods.   · Choose low-sodium versions of canned goods (such as soups, vegetables, and beans). Limit sugar  · Limit drinks and foods with added sugar. These include candy, desserts, and soda pop. Limit alcohol  · Limit alcohol to no more than 2 drinks a day for men and 1 drink a day for women. Too much alcohol can cause health problems. When should you call for help? Watch closely for changes in your health, and be sure to contact your doctor if:  · You would like help planning heart-healthy meals. Where can you learn more? Go to http://bradley-leilani.info/. Enter V137 in the search box to learn more about \"Heart-Healthy Diet: Care Instructions. \"  Current as of: April 3, 2017  Content Version: 11.3  © 4714-2846 [employer]. Care instructions adapted under license by [employer] (which disclaims liability or warranty for this information). If you have questions about a medical condition or this instruction, always ask your healthcare professional. [place] [address] any warranty or liability for your use of this information. Advance Directives: Care Instructions  Your Care Instructions  An advance directive is a legal way to state your wishes at the end of your life. It tells your family and your doctor what to do if you can no longer say what you want. There are two main types of advance directives. You can change them any time that your wishes change. · A living will tells your family and your doctor your wishes about life support and other treatment. · A durable power of  for health care lets you name a person to make treatment decisions for you when you can't speak for yourself. This person is called a health care agent. If you do not have an advance directive, decisions about your medical care may be made by a doctor or a  who doesn't know you. It may help to think of an advance directive as a gift to the people who care for you.  If you have one, they won't have to make tough decisions by themselves. Follow-up care is a key part of your treatment and safety. Be sure to make and go to all appointments, and call your doctor if you are having problems. It's also a good idea to know your test results and keep a list of the medicines you take. How can you care for yourself at home? · Discuss your wishes with your loved ones and your doctor. This way, there are no surprises. · Many states have a unique form. Or you might use a universal form that has been approved by many states. This kind of form can sometimes be completed and stored online. Your electronic copy will then be available wherever you have a connection to the Internet. In most cases, doctors will respect your wishes even if you have a form from a different state. · You don't need a  to do an advance directive. But you may want to get legal advice. · Think about these questions when you prepare an advance directive:  ¨ Who do you want to make decisions about your medical care if you are not able to? Many people choose a family member or close friend. ¨ Do you know enough about life support methods that might be used? If not, talk to your doctor so you understand. ¨ What are you most afraid of that might happen? You might be afraid of having pain, losing your independence, or being kept alive by machines. ¨ Where would you prefer to die? Choices include your home, a hospital, or a nursing home. ¨ Would you like to have information about hospice care to support you and your family? ¨ Do you want to donate organs when you die? ¨ Do you want certain Mandaeism practices performed before you die? If so, put your wishes in the advance directive. · Read your advance directive every year, and make changes as needed. When should you call for help? Be sure to contact your doctor if you have any questions. Where can you learn more? Go to http://bradley-leilani.info/.   Enter R264 in the search box to learn more about \"Advance Directives: Care Instructions. \"  Current as of: November 17, 2016  Content Version: 11.3  © 4193-3429 [employer]. Care instructions adapted under license by [employer] (which disclaims liability or warranty for this information). If you have questions about a medical condition or this instruction, always ask your healthcare professional. [address] any warranty or liability for your use of this information. Shoulder Blade: Exercises  Your Care Instructions  Here are some examples of typical exercises for your condition. Start each exercise slowly. Ease off the exercise if you start to have pain. Your doctor or physical therapist will tell you when you can start these exercises and which ones will work best for you. How to do the exercises  Shoulder roll    1. Stand tall with your chin slightly tucked. Imagine that a string at the top of your head is pulling you straight up. 2. Keep your arms relaxed. All motion will be in your shoulders. 3. Shrug your shoulders up toward your ears, then up and back. [place] your shoulders down and back, like you're sliding your hands down into your back pants pockets. 4. Repeat the circles at least 2 to 4 times. This exercise is also helpful anytime you want to relax. Lower neck and upper back stretch    1. With your arms about shoulder height, clasp your hands in front of you. 2. Drop your chin toward your chest.  3. Reach straight forward so you are rounding your upper back. Think about pulling your shoulder blades apart. [place] feel a stretch across your upper back and shoulders. Hold for at least 6 seconds. 4. Repeat 2 to 4 times. Triceps stretch    1. Reach your arm straight up. 2. Keeping your elbow in place, bend your arm and reach your hand down behind your back. 3. With your other hand, apply gentle pressure to the bent elbow.  [place] feel a stretch at the back of your upper arm and shoulder. Hold about 6 seconds. 4. Repeat 2 to 4 times with each arm. Shoulder stretch    1. Relax your shoulders. 2. Raise one arm to shoulder height, and reach it across your chest.  3. Pull the arm slightly toward you with your other arm. This will help you get a gentle stretch. Hold for about 6 seconds. 4. Repeat 2 to 4 times. Shoulder blade squeeze    1. Sit or stand up tall with your arms at your sides. 2. Keep your shoulders relaxed and down, not shrugged. 3. Squeeze your shoulder blades together. Hold for 6 seconds, then relax. 4. Repeat 8 to 12 times. Straight-arm shoulder blade squeeze    1. Sit or stand tall. Relax your shoulders. 2. With palms down, hold your elastic tubing or band straight out in front of you. 3. Start with slight tension in the tubing or band, with your hands about shoulder-width apart. 4. Slowly pull straight out to the sides, squeezing your shoulder blades together. Keep your arms straight and at shoulder height. Slowly release. 5. Repeat 8 to 12 times. Rowing    1. [place] your elastic tubing or band at about waist height. Take one end in each hand. 2. Sit or stand with your feet hip-width apart. 3. Hold your arms straight in front of you. Adjust your distance to create slight tension in the tubing or band. 4. Slightly tuck your chin. Relax your shoulders. 5. Without shrugging your shoulders, pull straight back. Your elbows will pass alongside your waist.  Pull-downs    1. [place] your elastic tubing or band in the top of a closed door. Take one end in each hand. 2. Either sit or stand, depending on what is more comfortable. If you feel unsteady, sit on a chair. 3. Start with your arms up and comfortably apart, elbows straight. There should be a slight tension in the tubing or band. 4. Slightly tuck your chin, and look straight ahead. 5. Keeping your back straight, slowly pull down and back, bending your elbows.   6. Stop where your hands are level with your chin, in a \"goalpost\" position. 7. Repeat 8 to 12 times. Chest T stretch    1. Lie on your back. Raise your knees so they are bent. Plant your feet on the floor, hip-width apart. 2. Tuck your chin, and relax your shoulders. 3. Reach your arms straight out to the sides. If you don't feel a mild stretch in your shoulders and across your chest, use a foam roll or a tightly rolled blanket under your spine, from your tailbone to your head. 4. Relax in this position for at least 15 to 30 seconds while you breathe normally. Repeat 2 to 4 times. As you get used to this stretch, keep adding a little more time until you are able relax in this position for 2 or 3 minutes. When you can relax for at least 2 minutes, you only need to do the exercise 1 time per session. Chest goalpost stretch    1. Lie on your back. Raise your knees so they are bent. Plant your feet on the floor, hip-width apart. 2. Tuck your chin, and relax your shoulders. 3. Reach your arms straight out to the sides. 4. Bend your arms at the elbows, with your hands pointed toward the top of your head. Your arms should make an L on either side of your head. Your palms should be facing up. 5. If you don't feel a mild stretch in your shoulders and across your chest, use a foam roll or tightly rolled blanket under your spine, from your tailbone to your head. 6. Relax in this position for at least 15 to 30 seconds while you breathe normally. Repeat 2 to 4 times. Each day you do this exercise, add a little more time until you can relax in this position for 2 or 3 minutes. When you can relax for at least 2 minutes, you only need to do the exercise 1 time per session. Follow-up care is a key part of your treatment and safety. Be sure to make and go to all appointments, and call your doctor if you are having problems. It's also a good idea to know your test results and keep a list of the medicines you take. Where can you learn more?   Go to http://bradley-leilani.info/. Enter [id number] in the search box to learn more about \"Shoulder Blade: Exercises. \"  Current as of: March 21, 2017  Content Version: 11.3  © 8181-6434 [email or web address]. Care instructions adapted under license by [employer] (which disclaims liability or warranty for this information). If you have questions about a medical condition or this instruction, always ask your healthcare professional. [address] any warranty or liability for your use of this information. Shoulder Arthritis: Exercises  Your Care Instructions  Here are some examples of typical rehabilitation exercises for your condition. Start each exercise slowly. Ease off the exercise if you start to have pain. Your doctor or physical therapist will tell you when you can start these exercises and which ones will work best for you. How to do the exercises  Shoulder flexion (lying down)    Note: To make a wand for this exercise, use a piece of PVC pipe or a broom handle with the broom removed. Make the wand about a foot wider than your shoulders. 5. Lie on your back, holding a wand with both hands. Your palms should face down as you hold the wand. 6. Keeping your elbows straight, slowly raise your arms over your head. Raise them until you feel a stretch in your shoulders, upper back, and chest.  7. Hold for 15 to 30 seconds. 8. Repeat 2 to 4 times. Shoulder rotation (lying down)    Note: To make a wand for this exercise, use a piece of PVC pipe or a broom handle with the broom removed. Make the wand about a foot wider than your shoulders. 5. Lie on your back. Hold a wand with both hands with your elbows bent and palms up. 6. Keep your elbows close to your body, and move the wand across your body toward the sore arm. 7. Hold for 8 to 12 seconds. 8. Repeat 2 to 4 times. Shoulder internal rotation with towel    5.  Hold a towel above and behind your head with the arm that is not sore. 6. With your sore arm, reach behind your back and grasp the towel. 7. With the arm above your head, pull the towel upward. Do this until you feel a stretch on the front and outside of your sore shoulder. 8. Hold 15 to 30 seconds. 9. Repeat 2 to 4 times. Shoulder blade squeeze    5. Stand with your arms at your sides, and squeeze your shoulder blades together. Do not raise your shoulders up as you squeeze. 6. Hold 6 seconds. 7. Repeat 8 to 12 times. Resisted rows    Note: For this exercise, you will need elastic exercise material, such as surgical tubing or Thera-Band. 5. Put the band around a solid object at about waist level. (A bedpost will work well.) Each hand should hold an end of the band. 6. With your elbows at your sides and bent to 90 degrees, pull the band back. Your shoulder blades should move toward each other. Return to the starting position. 7. Repeat 8 to 12 times. External rotator strengthening exercise    6. Start by tying a piece of elastic exercise material to a doorknob. You can use surgical tubing or Thera-Band. (You may also hold one end of the band in each hand.)  7. Stand or sit with your shoulder relaxed and your elbow bent 90 degrees. Your upper arm should rest comfortably against your side. Squeeze a rolled towel between your elbow and your body for comfort. This will help keep your arm at your side. 8. Hold one end of the elastic band with the hand of the painful arm. 9. Start with your forearm across your belly. Slowly rotate the forearm out away from your body. Keep your elbow and upper arm tucked against the towel roll or the side of your body until you begin to feel tightness in your shoulder. Slowly move your arm back to where you started. 10. Repeat 8 to 12 times. Internal rotator strengthening exercise    6. Start by tying a piece of elastic exercise material to a doorknob. You can use surgical tubing or Thera-Band.   7. Stand or sit with your shoulder relaxed and your elbow bent 90 degrees. Your upper arm should rest comfortably against your side. Squeeze a rolled towel between your elbow and your body for comfort. This will help keep your arm at your side. 8. Hold one end of the elastic band in the hand of the painful arm. 9. Slowly rotate your forearm toward your body until it touches your belly. Slowly move it back to where you started. 10. Keep your elbow and upper arm firmly tucked against the towel roll or at your side. 11. Repeat 8 to 12 times. Pendulum swing    Note: If you have pain in your back, do not do this exercise. 8. Hold on to a table or the back of a chair with your good arm. Then bend forward a little and let your sore arm hang straight down. This exercise does not use the arm muscles. Rather, use your legs and your hips to create movement that makes your arm swing freely. 9. Use the movement from your hips and legs to guide the slightly swinging arm back and forth like a pendulum (or elephant trunk). Then guide it in circles that start small (about the size of a dinner plate). Make the circles a bit larger each day, as your pain allows. 10. Do this exercise for 5 minutes, 5 to 7 times each day. 11. As you have less pain, try bending over a little farther to do this exercise. This will increase the amount of movement at your shoulder. Follow-up care is a key part of your treatment and safety. Be sure to make and go to all appointments, and call your doctor if you are having problems. It's also a good idea to know your test results and keep a list of the medicines you take. Where can you learn more? Go to http://bradley-leilani.info/. Enter H562 in the search box to learn more about \"Shoulder Arthritis: Exercises. \"  Current as of: March 21, 2017  Content Version: 11.3  © 6195-2757 [employer].  Care instructions adapted under license by [employer] (which disclaims liability or warranty for this information). If you have questions about a medical condition or this instruction, always ask your healthcare professional. [address] any warranty or liability for your use of this information. Shoulder Stretches: Exercises  Your Care Instructions  Here are some examples of exercises for your shoulder. Start each exercise slowly. Ease off the exercise if you start to have pain. Your doctor or physical therapist will tell you when you can start these exercises and which ones will work best for you. How to do the exercises  Note: These exercises should cause you to feel a gentle stretch, but no pain. Shoulder stretch    9.  a doorway and place one arm against the door frame. Your elbow should be a little higher than your shoulder. 10. Relax your shoulders as you lean forward, allowing your chest and shoulder muscles to stretch. You can also turn your body slightly away from your arm to stretch the muscles even more. 11. Hold for 15 to 30 seconds. 12. Repeat 2 to 4 times with each arm. Shoulder and chest stretch    Shoulder and chest stretch  9. While sitting, relax your upper body so you slump slightly in your chair. 10. As you breathe in, straighten your back and open your arms out to the sides. 11. Gently pull your shoulder blades back and downward. 12. Hold for 15 to 30 seconds as your breathe normally. 13. Repeat 2 to 4 times. Overhead stretch    10. Reach up over your head with both arms. 11. Hold for 15 to 30 seconds. 12. Repeat 2 to 4 times. Follow-up care is a key part of your treatment and safety. Be sure to make and go to all appointments, and call your doctor if you are having problems. It's also a good idea to know your test results and keep a list of the medicines you take. Where can you learn more? Go to http://bradley-leilani.info/.   Enter S254 in the search box to learn more about \"Shoulder Stretches: Exercises. \"  Current as of: March 21, 2017  Content Version: 11.3  © 7560-8985 [employer]. Care instructions adapted under license by [employer] (which disclaims liability or warranty for this information). If you have questions about a medical condition or this instruction, always ask your healthcare professional. [place] [address] any warranty or liability for your use of this information.

## 2017-07-12 NOTE — MR AVS SNAPSHOT
Visit Information Date & Time Provider Department Dept. Phone Encounter #  
 7/12/2017  3:30 PM Gabi Pearl MD [facility] [phone] [id number] Follow-up Instructions Return in about 1 year (around 7/12/2018), or if symptoms worsen or fail to improve, for Medicare wellness. Upcoming Health Maintenance Date Due DTaP/Tdap/Td series (1 - Tdap) 10/5/1969 FOBT Q 1 YEAR AGE 50-75 10/5/1998 ZOSTER VACCINE AGE 60> 10/5/2008 GLAUCOMA SCREENING Q2Y 10/5/2013 Pneumococcal 65+ Low/Medium Risk (2 of 2 - PCV13) 3/16/2016 MEDICARE YEARLY EXAM 7/2/2017 INFLUENZA AGE 9 TO ADULT 8/1/2017 Allergies as of 7/12/2017  Review Complete On: 7/12/2017 By: Gabi Pearl MD  
 No Known Allergies Current Immunizations  Reviewed on 3/16/2015 Name Date Pneumococcal Polysaccharide (PPSV-23) 3/16/2015 11:03 AM  
  
 Not reviewed this visit You Were Diagnosed With   
  
 Codes Comments Routine general medical examination at a health care facility    -  Primary ICD-10-CM: Z00.00 ICD-9-CM: V70.0 Screening for alcoholism     ICD-10-CM: Z13.89 ICD-9-CM: V79.1 Screening for depression     ICD-10-CM: Z13.89 ICD-9-CM: V79.0 Screen for colon cancer     ICD-10-CM: Z12.11 ICD-9-CM: V76.51 Arthritis     ICD-10-CM: M19.90 ICD-9-CM: 716.90 Chronic right shoulder pain     ICD-10-CM: M25.511, G89.29 ICD-9-CM: 719.41, 338.29 Palsy ([address]     ICD-10-CM: G83.9 ICD-9-CM: 344. 9 Vitals BP Pulse Temp Resp Height(growth percentile) Weight(growth percentile) 149/85 76 97.4 °F (36.3 °C) 16 5' 9\" (1.753 m) 124 lb (56.2 kg) SpO2 BMI Smoking Status 98% 18.31 kg/m2 Former Smoker Vitals History BMI and BSA Data Body Mass Index Body Surface Area  
 18.31 kg/m 2 1.65 m 2 Preferred Pharmacy Pharmacy Name Phone [id number]. Lori Nieves [address] [facility]. [phone] Your Updated Medication List  
  
   
This list is accurate as of: 7/12/17  4:06 PM.  Always use your most recent med list.  
  
  
  
  
 acetaminophen 650 mg CR tablet Commonly known as:  TYLENOL Take 1 Tab by mouth as needed. carBAMazepine  mg SR tablet Commonly known as:  TEGretol XR Take 1 Tab by mouth two (2) times a day. latanoprost 0.005 % ophthalmic solution Commonly known as:  XALATAN  
place 1 drop into both eyes NIGHTLY  
  
 pravastatin 40 mg tablet Commonly known as:  PRAVACHOL Take 1 Tab by mouth nightly. Prescriptions Sent to Pharmacy Refills  
 acetaminophen (TYLENOL) 650 mg CR tablet 1 Sig: Take 1 Tab by mouth as needed. Class: Normal  
 Pharmacy: [id number]. Tammi Underwood[address] [facility]. Ph #: [phone] Route: Oral  
  
We Performed the Following [place] [address] [[id number] \[facility] [place] [facility]\""] Follow-up Instructions Return in about 1 year (around 7/12/2018), or if symptoms worsen or fail to improve, for Medicare wellness. To-Do List   
 07/12/2017 Lab:  OCCULT BLOOD, IMMUNOASSAY (FIT)   
  
 07/12/2017 Imaging:  XR SHOULDER RT AP/LAT MIN 2 V Patient Instructions Medicare Wellness Visit, Male The best way to live healthy is to have a healthy lifestyle by eating a well-balanced diet, exercising regularly, limiting alcohol and stopping smoking. Regular physical exams and screening tests are another way to keep healthy. Preventive exams provided by your health care provider can find health problems before they become diseases or illnesses. Preventive services including immunizations, screening tests, monitoring and exams can help you take care of your own health.  
 
All people over age 72 should have a pneumovax  and and a prevnar shot to prevent pneumonia. These are once in a lifetime unless you and your provider decide differently. All people over 65 should have a yearly flu shot and a tetanus vaccine every 10 years. Screening for diabetes mellitus with a blood sugar test should be done every year. Glaucoma is a disease of the eye due to increased ocular pressure that can lead to blindness and it should be done every year by an eye professional. 
 
Cardiovascular screening tests that check for elevated lipids (fatty part of blood) which can lead to heart disease and strokes should be done every 5 years. Colorectal screening that evaluates for blood or polyps in your colon should be done yearly as a stool test or every five years as a flexible sigmoidoscope or every 10 years as a colonoscopy up to age 76. Men up to age 76 may need a screening blood test for prostate cancer at certain intervals, depending on their personal and family history. This decision is between the patient and his provider. If you have been a smoker or had family history of abdominal aortic aneurysms, you and your provider may decide to schedule an ultrasound test of your aorta. Hepatitis C screening is also recommended for anyone born between [birth year] through [address]. A shingles vaccine is also recommended once in a lifetime after age 61. Your Medicare Wellness Exam is recommended annually. Here is a list of your current Health Maintenance items with a due date: 
Health Maintenance Due Topic Date Due  
 DTaP/Tdap/Td  (1 - Tdap) 10/05/1969  Stool testing for trace blood  10/05/1998  Shingles Vaccine  10/05/2008  Glaucoma Screening   10/05/2013  Pneumococcal Vaccine (2 of 2 - PCV13) 03/16/2016 Adrienne Dunham Annual Well Visit  07/02/2017 Heart-Healthy Diet: Care Instructions Your Care Instructions A heart-healthy diet has lots of vegetables, fruits, nuts, beans, and whole grains, and is low in salt.  It limits foods that are high in saturated fat, such as meats, cheeses, and fried foods. It may be hard to change your diet, but even small changes can lower your risk of heart attack and heart disease. Follow-up care is a key part of your treatment and safety. Be sure to make and go to all appointments, and call your doctor if you are having problems. It's also a good idea to know your test results and keep a list of the medicines you take. How can you care for yourself at home? Watch your portions · Learn what a serving is. A \"serving\" and a \"portion\" are not always the same thing. Make sure that you are not eating larger portions than are recommended. For example, a serving of pasta is ½ cup. A serving size of meat is 2 to 3 ounces. A 3-ounce serving is about the size of a deck of cards. Measure serving sizes until you are good at [place]" them. Keep in mind that restaurants often serve portions that are 2 or 3 times the size of one serving. · To keep your energy level up and keep you from feeling hungry, eat often but in smaller portions. · Eat only the number of calories you need to stay at a healthy weight. If you need to lose weight, eat fewer calories than your body burns (through exercise and other physical activity). Eat more fruits and vegetables · Eat a variety of fruit and vegetables every day. Dark green, deep orange, red, or yellow fruits and vegetables are especially good for you. Examples include spinach, carrots, peaches, and berries. · Keep carrots, celery, and other veggies handy for snacks. Buy fruit that is in season and store it where you can see it so that you will be tempted to eat it. · Cook dishes that have a lot of veggies in them, such as stir-fries and soups. Limit saturated and trans fat · Read food labels, and try to avoid saturated and trans fats. They increase your risk of heart disease. Trans fat is found in many processed foods such as cookies and crackers. · Use olive or canola oil when you cook. Try cholesterol-lowering spreads, such as Benecol or Take Control. · Bake, broil, grill, or steam foods instead of frying them. · Choose lean meats instead of high-fat meats such as hot dogs and sausages. Cut off all visible fat when you prepare meat. · Eat fish, skinless poultry, and meat alternatives such as soy products instead of high-fat meats. Soy products, such as tofu, may be especially good for your heart. · Choose low-fat or fat-free milk and dairy products. Eat fish · Eat at least two servings of fish a week. Certain fish, such as salmon and tuna, contain omega-3 fatty acids, which may help reduce your risk of heart attack. Eat foods high in fiber · Eat a variety of grain products every day. Include whole-grain foods that have lots of fiber and nutrients. Examples of whole-grain foods include oats, whole wheat bread, and brown rice. · Buy whole-grain breads and cereals, instead of white bread or pastries. Limit salt and sodium · Limit how much salt and sodium you eat to help lower your blood pressure. · Taste food before you salt it. Add only a little salt when you think you need it. With time, your taste buds will adjust to less salt. · Eat fewer snack items, fast foods, and other high-salt, processed foods. Check food labels for the amount of sodium in packaged foods. · Choose low-sodium versions of canned goods (such as soups, vegetables, and beans). Limit sugar · Limit drinks and foods with added sugar. These include candy, desserts, and soda pop. Limit alcohol · Limit alcohol to no more than 2 drinks a day for men and 1 drink a day for women. Too much alcohol can cause health problems. When should you call for help? Watch closely for changes in your health, and be sure to contact your doctor if: 
· You would like help planning heart-healthy meals. Where can you learn more? Go to http://rosa.info/. Enter V137 in the search box to learn more about \"Heart-Healthy Diet: Care Instructions. \" Current as of: April 3, 2017 Content Version: 11.3 © 5812-0776 [employer]. Care instructions adapted under license by [employer] (which disclaims liability or warranty for this information). If you have questions about a medical condition or this instruction, always ask your healthcare professional. [address] any warranty or liability for your use of this information. Advance Directives: Care Instructions Your Care Instructions An advance directive is a legal way to state your wishes at the end of your life. It tells your family and your doctor what to do if you can no longer say what you want. There are two main types of advance directives. You can change them any time that your wishes change. · A living will tells your family and your doctor your wishes about life support and other treatment. · A durable power of  for health care lets you name a person to make treatment decisions for you when you can't speak for yourself. This person is called a health care agent. If you do not have an advance directive, decisions about your medical care may be made by a doctor or a  who doesn't know you. It may help to think of an advance directive as a gift to the people who care for you. If you have one, they won't have to make tough decisions by themselves. Follow-up care is a key part of your treatment and safety. Be sure to make and go to all appointments, and call your doctor if you are having problems. It's also a good idea to know your test results and keep a list of the medicines you take. How can you care for yourself at home? · Discuss your wishes with your loved ones and your doctor. This way, there are no surprises. · Many states have a unique form.  Or you might use a universal form that has been approved by many states. This kind of form can sometimes be completed and stored online. Your electronic copy will then be available wherever you have a connection to the Internet. In most cases, doctors will respect your wishes even if you have a form from a different state. · You don't need a  to do an advance directive. But you may want to get legal advice. · Think about these questions when you prepare an advance directive: ¨ Who do you want to make decisions about your medical care if you are not able to? Many people choose a family member or close friend. ¨ Do you know enough about life support methods that might be used? If not, talk to your doctor so you understand. ¨ What are you most afraid of that might happen? You might be afraid of having pain, losing your independence, or being kept alive by machines. ¨ Where would you prefer to die? Choices include your home, a hospital, or a nursing home. ¨ Would you like to have information about hospice care to support you and your family? ¨ Do you want to donate organs when you die? ¨ Do you want certain Adventism practices performed before you die? If so, put your wishes in the advance directive. · Read your advance directive every year, and make changes as needed. When should you call for help? Be sure to contact your doctor if you have any questions. Where can you learn more? Go to http://bradley-leilani.info/. Enter R264 in the search box to learn more about \"Advance Directives: Care Instructions. \" Current as of: November 17, 2016 Content Version: 11.3 © 4338-2155 Healthwise, Incorporated. Care instructions adapted under license by [employer] (which disclaims liability or warranty for this information).  If you have questions about a medical condition or this instruction, always ask your healthcare professional. [address] any warranty or liability for your use of this information. Shoulder Blade: Exercises Your Care Instructions Here are some examples of typical exercises for your condition. Start each exercise slowly. Ease off the exercise if you start to have pain. Your doctor or physical therapist will tell you when you can start these exercises and which ones will work best for you. How to do the exercises Shoulder roll 1. Stand tall with your chin slightly tucked. Imagine that a string at the top of your head is pulling you straight up. 2. Keep your arms relaxed. All motion will be in your shoulders. 3. Shrug your shoulders up toward your ears, then up and back. [place] your shoulders down and back, like you're sliding your hands down into your back pants pockets. 4. Repeat the circles at least 2 to 4 times. This exercise is also helpful anytime you want to relax. Lower neck and upper back stretch 1. With your arms about shoulder height, clasp your hands in front of you. 2. Drop your chin toward your chest. 
3. Reach straight forward so you are rounding your upper back. Think about pulling your shoulder blades apart. Babatunde Mcfarland feel a stretch across your upper back and shoulders. Hold for at least 6 seconds. 4. Repeat 2 to 4 times. Triceps stretch 1. Reach your arm straight up. 2. Keeping your elbow in place, bend your arm and reach your hand down behind your back. 3. With your other hand, apply gentle pressure to the bent elbow. Babatunde Mcfarland feel a stretch at the back of your upper arm and shoulder. Hold about 6 seconds. 4. Repeat 2 to 4 times with each arm. Shoulder stretch 1. Relax your shoulders. 2. Raise one arm to shoulder height, and reach it across your chest. 
3. Pull the arm slightly toward you with your other arm. This will help you get a gentle stretch. Hold for about 6 seconds. 4. Repeat 2 to 4 times. Shoulder blade squeeze 1. Sit or stand up tall with your arms at your sides. 2. Keep your shoulders relaxed and down, not shrugged. 3. Squeeze your shoulder blades together. Hold for 6 seconds, then relax. 4. Repeat 8 to 12 times. Straight-arm shoulder blade squeeze 1. Sit or stand tall. Relax your shoulders. 2. With palms down, hold your elastic tubing or band straight out in front of you. 3. Start with slight tension in the tubing or band, with your hands about shoulder-width apart. 4. Slowly pull straight out to the sides, squeezing your shoulder blades together. Keep your arms straight and at shoulder height. Slowly release. 5. Repeat 8 to 12 times. Rowing 1. [place] your elastic tubing or band at about waist height. Take one end in each hand. 2. Sit or stand with your feet hip-width apart. 3. Hold your arms straight in front of you. Adjust your distance to create slight tension in the tubing or band. 4. Slightly tuck your chin. Relax your shoulders. 5. Without shrugging your shoulders, pull straight back. Your elbows will pass alongside your waist. 
Pull-downs 1. [place] your elastic tubing or band in the top of a closed door. Take one end in each hand. 2. Either sit or stand, depending on what is more comfortable. If you feel unsteady, sit on a chair. 3. Start with your arms up and comfortably apart, elbows straight. There should be a slight tension in the tubing or band. 4. Slightly tuck your chin, and look straight ahead. 5. Keeping your back straight, slowly pull down and back, bending your elbows. 6. Stop where your hands are level with your chin, in a \"goalpost\" position. 7. Repeat 8 to 12 times. Chest T stretch 1. Lie on your back. Raise your knees so they are bent. Plant your feet on the floor, hip-width apart. 2. Tuck your chin, and relax your shoulders. 3. Reach your arms straight out to the sides. If you don't feel a mild stretch in your shoulders and across your chest, use a foam roll or a tightly rolled blanket under your spine, from your tailbone to your head. 4. Relax in this position for at least 15 to 30 seconds while you breathe normally. Repeat 2 to 4 times. As you get used to this stretch, keep adding a little more time until you are able relax in this position for 2 or 3 minutes. When you can relax for at least 2 minutes, you only need to do the exercise 1 time per session. Chest goalpost stretch 1. Lie on your back. Raise your knees so they are bent. Plant your feet on the floor, hip-width apart. 2. Tuck your chin, and relax your shoulders. 3. Reach your arms straight out to the sides. 4. Bend your arms at the elbows, with your hands pointed toward the top of your head. Your arms should make an L on either side of your head. Your palms should be facing up. 5. If you don't feel a mild stretch in your shoulders and across your chest, use a foam roll or tightly rolled blanket under your spine, from your tailbone to your head. 6. Relax in this position for at least 15 to 30 seconds while you breathe normally. Repeat 2 to 4 times. Each day you do this exercise, add a little more time until you can relax in this position for 2 or 3 minutes. When you can relax for at least 2 minutes, you only need to do the exercise 1 time per session. Follow-up care is a key part of your treatment and safety. Be sure to make and go to all appointments, and call your doctor if you are having problems. It's also a good idea to know your test results and keep a list of the medicines you take. Where can you learn more? Go to http://bradley-leilani.info/. Enter ([phone] in the search box to learn more about \"Shoulder Blade: Exercises. \" Current as of: March 21, 2017 Content Version: 11.3 © 9085-1493 [employer], Incorporated. Care instructions adapted under license by [employer] (which disclaims liability or warranty for this information).  If you have questions about a medical condition or this instruction, always ask your healthcare professional. [place] [address] any warranty or liability for your use of this information. Shoulder Arthritis: Exercises Your Care Instructions Here are some examples of typical rehabilitation exercises for your condition. Start each exercise slowly. Ease off the exercise if you start to have pain. Your doctor or physical therapist will tell you when you can start these exercises and which ones will work best for you. How to do the exercises Shoulder flexion (lying down) Note: To make a wand for this exercise, use a piece of PVC pipe or a broom handle with the broom removed. Make the wand about a foot wider than your shoulders. 5. Lie on your back, holding a wand with both hands. Your palms should face down as you hold the wand. 6. Keeping your elbows straight, slowly raise your arms over your head. Raise them until you feel a stretch in your shoulders, upper back, and chest. 
7. Hold for 15 to 30 seconds. 8. Repeat 2 to 4 times. Shoulder rotation (lying down) Note: To make a wand for this exercise, use a piece of PVC pipe or a broom handle with the broom removed. Make the wand about a foot wider than your shoulders. 5. Lie on your back. Hold a wand with both hands with your elbows bent and palms up. 6. Keep your elbows close to your body, and move the wand across your body toward the sore arm. 7. Hold for 8 to 12 seconds. 8. Repeat 2 to 4 times. Shoulder internal rotation with towel 5. Hold a towel above and behind your head with the arm that is not sore. 6. With your sore arm, reach behind your back and grasp the towel. 7. With the arm above your head, pull the towel upward. Do this until you feel a stretch on the front and outside of your sore shoulder. 8. Hold 15 to 30 seconds. 9. Repeat 2 to 4 times. Shoulder blade squeeze 5.  Stand with your arms at your sides, and squeeze your shoulder blades together. Do not raise your shoulders up as you squeeze. 6. Hold 6 seconds. 7. Repeat 8 to 12 times. Resisted rows Note: For this exercise, you will need elastic exercise material, such as surgical tubing or Thera-Band. 5. Put the band around a solid object at about waist level. (A bedpost will work well.) Each hand should hold an end of the band. 6. With your elbows at your sides and bent to 90 degrees, pull the band back. Your shoulder blades should move toward each other. Return to the starting position. 7. Repeat 8 to 12 times. External rotator strengthening exercise 6. Start by tying a piece of elastic exercise material to a doorknob. You can use surgical tubing or Thera-Band. (You may also hold one end of the band in each hand.) 7. Stand or sit with your shoulder relaxed and your elbow bent 90 degrees. Your upper arm should rest comfortably against your side. Squeeze a rolled towel between your elbow and your body for comfort. This will help keep your arm at your side. 8. Hold one end of the elastic band with the hand of the painful arm. 9. Start with your forearm across your belly. Slowly rotate the forearm out away from your body. Keep your elbow and upper arm tucked against the towel roll or the side of your body until you begin to feel tightness in your shoulder. Slowly move your arm back to where you started. 10. Repeat 8 to 12 times. Internal rotator strengthening exercise 6. Start by tying a piece of elastic exercise material to a doorknob. You can use surgical tubing or Thera-Band. 7. Stand or sit with your shoulder relaxed and your elbow bent 90 degrees. Your upper arm should rest comfortably against your side. Squeeze a rolled towel between your elbow and your body for comfort. This will help keep your arm at your side. 8. Hold one end of the elastic band in the hand of the painful arm.  
9. Slowly rotate your forearm toward your body until it touches your belly. Slowly move it back to where you started. 10. Keep your elbow and upper arm firmly tucked against the towel roll or at your side. 11. Repeat 8 to 12 times. Pendulum swing Note: If you have pain in your back, do not do this exercise. 8. Hold on to a table or the back of a chair with your good arm. Then bend forward a little and let your sore arm hang straight down. This exercise does not use the arm muscles. Rather, use your legs and your hips to create movement that makes your arm swing freely. 9. Use the movement from your hips and legs to guide the slightly swinging arm back and forth like a pendulum (or elephant trunk). Then guide it in circles that start small (about the size of a dinner plate). Make the circles a bit larger each day, as your pain allows. 10. Do this exercise for 5 minutes, 5 to 7 times each day. 11. As you have less pain, try bending over a little farther to do this exercise. This will increase the amount of movement at your shoulder. Follow-up care is a key part of your treatment and safety. Be sure to make and go to all appointments, and call your doctor if you are having problems. It's also a good idea to know your test results and keep a list of the medicines you take. Where can you learn more? Go to http://bradley-leilani.info/. Enter H562 in the search box to learn more about \"Shoulder Arthritis: Exercises. \" Current as of: March 21, 2017 Content Version: 11.3 © 5288-1681 [employer]. Care instructions adapted under license by [employer] (which disclaims liability or warranty for this information). If you have questions about a medical condition or this instruction, always ask your healthcare professional. [address] any warranty or liability for your use of this information. Shoulder Stretches: Exercises Your Care Instructions Here are some examples of exercises for your shoulder. Start each exercise slowly. Ease off the exercise if you start to have pain. Your doctor or physical therapist will tell you when you can start these exercises and which ones will work best for you. How to do the exercises Note: These exercises should cause you to feel a gentle stretch, but no pain. Shoulder stretch 9.  a doorway and place one arm against the door frame. Your elbow should be a little higher than your shoulder. 10. Relax your shoulders as you lean forward, allowing your chest and shoulder muscles to stretch. You can also turn your body slightly away from your arm to stretch the muscles even more. 11. Hold for 15 to 30 seconds. 12. Repeat 2 to 4 times with each arm. Shoulder and chest stretch Shoulder and chest stretch 9. While sitting, relax your upper body so you slump slightly in your chair. 10. As you breathe in, straighten your back and open your arms out to the sides. 11. Gently pull your shoulder blades back and downward. 12. Hold for 15 to 30 seconds as your breathe normally. 13. Repeat 2 to 4 times. Overhead stretch 10. Reach up over your head with both arms. 11. Hold for 15 to 30 seconds. 12. Repeat 2 to 4 times. Follow-up care is a key part of your treatment and safety. Be sure to make and go to all appointments, and call your doctor if you are having problems. It's also a good idea to know your test results and keep a list of the medicines you take. Where can you learn more? Go to http://bradley-leilani.info/. Enter S254 in the search box to learn more about \"Shoulder Stretches: Exercises. \" Current as of: March 21, 2017 Content Version: 11.3 © 6750-5504 [address]. Care instructions adapted under license by [employer] (which disclaims liability or warranty for this information).  If you have questions about a medical condition or this instruction, always ask your healthcare professional. [address] any warranty or liability for your use of this information. Introducing [facility]! [facility] introduces TuneWiki patient portal. Now you can access parts of your medical record, email your doctor's office, and request medication refills online. 1. In your internet browser, go to [email or web address] ([email or web address] 2. Click on the First Time User? Click Here link in the Sign In box. You will see the New Member Sign Up page. 3. Enter your TuneWiki Access Code exactly as it appears below. You will not need to use this code after youve completed the sign-up process. If you do not sign up before the expiration date, you must request a new code. · TuneWiki Access Code: [email or web address] Expires: 10/10/2017  4:06 PM 
 
4. Enter the last four digits of your Social Security Number (xxxx) and Date of Birth (mm/dd/yyyy) as indicated and click Submit. You will be taken to the next sign-up page. 5. Create a TuneWiki ID. This will be your TuneWiki login ID and cannot be changed, so think of one that is secure and easy to remember. 6. Create a TuneWiki password. You can change your password at any time. 7. Enter your Password Reset Question and Answer. This can be used at a later time if you forget your password. 8. Enter your e-mail address. You will receive e-mail notification when new information is available in [address] 9. Click Sign Up. You can now view and download portions of your medical record. 10. Click the Download Summary menu link to download a portable copy of your medical information. If you have questions, please visit the Frequently Asked Questions section of the TuneWiki website. Remember, TuneWiki is NOT to be used for urgent needs. For medical emergencies, dial 911. Now available from your iPhone and Android! Please provide this summary of care documentation to your next provider. Your primary care clinician is listed as Juan Cline. If you have any questions after today's visit, please call [phone].

## 2017-07-13 LAB
C TRACH RRNA SPEC QL NAA+PROBE: NEGATIVE
N GONORRHOEA RRNA SPEC QL NAA+PROBE: NEGATIVE
SPECIMEN SOURCE: NORMAL

## 2017-07-29 DIAGNOSIS — R56.9 CONVULSIONS, UNSPECIFIED CONVULSION TYPE (HCC): ICD-10-CM

## 2017-07-29 DIAGNOSIS — H40.9 GLAUCOMA OF BOTH EYES, UNSPECIFIED GLAUCOMA: ICD-10-CM

## 2017-07-31 RX ORDER — LATANOPROST 50 UG/ML
SOLUTION/ DROPS OPHTHALMIC
Qty: 2.5 ML | Refills: 1 | Status: SHIPPED | OUTPATIENT
Start: 2017-07-31 | End: 2017-09-27 | Stop reason: SDUPTHER

## 2017-07-31 RX ORDER — CARBAMAZEPINE 400 MG/1
TABLET, EXTENDED RELEASE ORAL
Qty: 60 TAB | Refills: 11 | Status: SHIPPED | OUTPATIENT
Start: 2017-07-31 | End: 2018-09-24 | Stop reason: SDUPTHER

## 2017-09-27 DIAGNOSIS — H40.9 GLAUCOMA OF BOTH EYES, UNSPECIFIED GLAUCOMA: ICD-10-CM

## 2017-09-27 RX ORDER — LATANOPROST 50 UG/ML
SOLUTION/ DROPS OPHTHALMIC
Qty: 2.5 ML | Refills: 1 | Status: SHIPPED | OUTPATIENT
Start: 2017-09-27 | End: 2018-02-06 | Stop reason: SDUPTHER

## 2018-02-06 RX ORDER — LATANOPROST 50 UG/ML
SOLUTION/ DROPS OPHTHALMIC
Qty: 2.5 ML | Refills: 1 | Status: SHIPPED | OUTPATIENT
Start: 2018-02-06 | End: 2018-04-25 | Stop reason: SDUPTHER

## 2018-02-06 NOTE — TELEPHONE ENCOUNTER
Patient's last office visit on 7/12/17  Medication(s) last filled on 9/27/2017   Next Appointment: 7/12/2018  Rx Class Normal

## 2018-04-25 RX ORDER — LATANOPROST 50 UG/ML
SOLUTION/ DROPS OPHTHALMIC
Qty: 2.5 ML | Refills: 1 | Status: SHIPPED | OUTPATIENT
Start: 2018-04-25 | End: 2018-09-22 | Stop reason: SDUPTHER

## 2018-08-27 ENCOUNTER — TELEPHONE (OUTPATIENT)
Dept: FAMILY MEDICINE CLINIC | Facility: CLINIC | Age: 68
End: 2018-08-27

## 2018-08-27 NOTE — TELEPHONE ENCOUNTER
Called patient and left message to call to schedule appt (as a new patient with Dr Lindy Navarro) to get refill on meds.

## 2018-09-22 DIAGNOSIS — R56.9 CONVULSIONS, UNSPECIFIED CONVULSION TYPE (HCC): ICD-10-CM

## 2018-09-22 DIAGNOSIS — H40.10X0 OPEN-ANGLE GLAUCOMA, UNSPECIFIED GLAUCOMA STAGE, UNSPECIFIED LATERALITY, UNSPECIFIED OPEN-ANGLE GLAUCOMA TYPE: Primary | ICD-10-CM

## 2018-09-24 RX ORDER — CARBAMAZEPINE 400 MG/1
TABLET, EXTENDED RELEASE ORAL
Qty: 60 TAB | Refills: 11 | Status: SHIPPED | OUTPATIENT
Start: 2018-09-24 | End: 2020-04-16 | Stop reason: SDUPTHER

## 2018-09-24 RX ORDER — LATANOPROST 50 UG/ML
SOLUTION/ DROPS OPHTHALMIC
Qty: 2.5 ML | Refills: 1 | Status: SHIPPED | OUTPATIENT
Start: 2018-09-24

## 2018-09-24 RX ORDER — CARBAMAZEPINE 400 MG/1
TABLET, EXTENDED RELEASE ORAL
Qty: 60 TAB | Refills: 11 | Status: CANCELLED | OUTPATIENT
Start: 2018-09-24

## 2019-01-29 ENCOUNTER — ANESTHESIA EVENT (OUTPATIENT)
Dept: ENDOSCOPY | Age: 69
End: 2019-01-29
Payer: MEDICARE

## 2019-01-30 ENCOUNTER — HOSPITAL ENCOUNTER (OUTPATIENT)
Age: 69
Setting detail: OUTPATIENT SURGERY
Discharge: HOME | End: 2019-01-30
Attending: INTERNAL MEDICINE | Admitting: INTERNAL MEDICINE
Payer: MEDICARE

## 2019-01-30 ENCOUNTER — ANESTHESIA (OUTPATIENT)
Dept: ENDOSCOPY | Age: 69
End: 2019-01-30
Payer: MEDICARE

## 2019-01-30 VITALS
BODY MASS INDEX: 19.4 KG/M2 | HEART RATE: 66 BPM | WEIGHT: 131 LBS | TEMPERATURE: 97 F | SYSTOLIC BLOOD PRESSURE: 144 MMHG | RESPIRATION RATE: 16 BRPM | OXYGEN SATURATION: 96 % | HEIGHT: 69 IN | DIASTOLIC BLOOD PRESSURE: 76 MMHG

## 2019-01-30 PROCEDURE — 74011000250 HC RX REV CODE- 250: Performed by: NURSE ANESTHETIST, CERTIFIED REGISTERED

## 2019-01-30 PROCEDURE — 74011250636 HC RX REV CODE- 250/636: Performed by: NURSE ANESTHETIST, CERTIFIED REGISTERED

## 2019-01-30 PROCEDURE — 76040000019: Performed by: INTERNAL MEDICINE

## 2019-01-30 PROCEDURE — 74011250636 HC RX REV CODE- 250/636

## 2019-01-30 PROCEDURE — 77030008565 HC TBNG SUC IRR ERBE -B: Performed by: INTERNAL MEDICINE

## 2019-01-30 PROCEDURE — 76060000031 HC ANESTHESIA FIRST 0.5 HR: Performed by: INTERNAL MEDICINE

## 2019-01-30 PROCEDURE — 88305 TISSUE EXAM BY PATHOLOGIST: CPT

## 2019-01-30 PROCEDURE — 77030018846 HC SOL IRR STRL H20 ICUM -A: Performed by: INTERNAL MEDICINE

## 2019-01-30 PROCEDURE — 77030009426 HC FCPS BIOP ENDOSC BSC -B: Performed by: INTERNAL MEDICINE

## 2019-01-30 RX ORDER — SODIUM CHLORIDE 0.9 % (FLUSH) 0.9 %
5-40 SYRINGE (ML) INJECTION AS NEEDED
Status: DISCONTINUED | OUTPATIENT
Start: 2019-01-30 | End: 2019-01-30 | Stop reason: HOSPADM

## 2019-01-30 RX ORDER — PROPOFOL 10 MG/ML
INJECTION, EMULSION INTRAVENOUS AS NEEDED
Status: DISCONTINUED | OUTPATIENT
Start: 2019-01-30 | End: 2019-01-30 | Stop reason: HOSPADM

## 2019-01-30 RX ORDER — FENTANYL CITRATE 50 UG/ML
50 INJECTION, SOLUTION INTRAMUSCULAR; INTRAVENOUS AS NEEDED
Status: DISCONTINUED | OUTPATIENT
Start: 2019-01-30 | End: 2019-01-30 | Stop reason: HOSPADM

## 2019-01-30 RX ORDER — LIDOCAINE HYDROCHLORIDE 10 MG/ML
0.1 INJECTION, SOLUTION EPIDURAL; INFILTRATION; INTRACAUDAL; PERINEURAL AS NEEDED
Status: DISCONTINUED | OUTPATIENT
Start: 2019-01-30 | End: 2019-01-30 | Stop reason: HOSPADM

## 2019-01-30 RX ORDER — ONDANSETRON 2 MG/ML
4 INJECTION INTRAMUSCULAR; INTRAVENOUS ONCE
Status: DISCONTINUED | OUTPATIENT
Start: 2019-01-30 | End: 2019-01-30 | Stop reason: HOSPADM

## 2019-01-30 RX ORDER — SODIUM CHLORIDE 0.9 % (FLUSH) 0.9 %
5-40 SYRINGE (ML) INJECTION EVERY 8 HOURS
Status: DISCONTINUED | OUTPATIENT
Start: 2019-01-30 | End: 2019-01-30 | Stop reason: HOSPADM

## 2019-01-30 RX ORDER — MAGNESIUM SULFATE 100 %
4 CRYSTALS MISCELLANEOUS AS NEEDED
Status: DISCONTINUED | OUTPATIENT
Start: 2019-01-30 | End: 2019-01-30 | Stop reason: HOSPADM

## 2019-01-30 RX ORDER — SODIUM CHLORIDE, SODIUM LACTATE, POTASSIUM CHLORIDE, CALCIUM CHLORIDE 600; 310; 30; 20 MG/100ML; MG/100ML; MG/100ML; MG/100ML
75 INJECTION, SOLUTION INTRAVENOUS CONTINUOUS
Status: DISCONTINUED | OUTPATIENT
Start: 2019-01-30 | End: 2019-01-30 | Stop reason: HOSPADM

## 2019-01-30 RX ORDER — INSULIN LISPRO 100 [IU]/ML
INJECTION, SOLUTION INTRAVENOUS; SUBCUTANEOUS ONCE
Status: DISCONTINUED | OUTPATIENT
Start: 2019-01-30 | End: 2019-01-30 | Stop reason: HOSPADM

## 2019-01-30 RX ORDER — DEXTROSE 50 % IN WATER (D50W) INTRAVENOUS SYRINGE
25-50 AS NEEDED
Status: DISCONTINUED | OUTPATIENT
Start: 2019-01-30 | End: 2019-01-30 | Stop reason: HOSPADM

## 2019-01-30 RX ORDER — LIDOCAINE HYDROCHLORIDE 20 MG/ML
INJECTION, SOLUTION EPIDURAL; INFILTRATION; INTRACAUDAL; PERINEURAL AS NEEDED
Status: DISCONTINUED | OUTPATIENT
Start: 2019-01-30 | End: 2019-01-30 | Stop reason: HOSPADM

## 2019-01-30 RX ADMIN — SODIUM CHLORIDE, SODIUM LACTATE, POTASSIUM CHLORIDE, AND CALCIUM CHLORIDE 75 ML/HR: 600; 310; 30; 20 INJECTION, SOLUTION INTRAVENOUS at 08:39

## 2019-01-30 RX ADMIN — LIDOCAINE HYDROCHLORIDE 40 MG: 20 INJECTION, SOLUTION EPIDURAL; INFILTRATION; INTRACAUDAL; PERINEURAL at 10:32

## 2019-01-30 RX ADMIN — PROPOFOL 50 MG: 10 INJECTION, EMULSION INTRAVENOUS at 10:32

## 2019-01-30 RX ADMIN — FAMOTIDINE 20 MG: 10 INJECTION INTRAVENOUS at 08:39

## 2019-01-30 NOTE — H&P
Chief Complaint: crcs History of present illness: no complaints. PMH:  
Past Medical History:  
Diagnosis Date  Glaucoma  History of poliomyelitis  Hyperlipidemia  Seizures ([facility] [address] Allergies: No Known Allergies Medications:  
Current Facility-Administered Medications:  
  lidocaine (PF) (XYLOCAINE) 10 mg/mL (1 %) injection 0.1 mL, 0.1 mL, SubCUTAneous, PRN, Marcelene Chancy, CRNA   lactated Ringers infusion, 75 mL/hr, IntraVENous, CONTINUOUS, Hill, Rosaura, CRNA, Last Rate: 75 mL/hr at 01/30/19 0839, 75 mL/hr at 01/30/19 0839 
  sodium chloride (NS) flush 5-40 mL, 5-40 mL, IntraVENous, Q8H, Theo Cordova Smoker, CRNA   sodium chloride (NS) flush 5-40 mL, 5-40 mL, IntraVENous, PRN, Marcelene Chancy, CRNA   insulin lispro (HUMALOG) injection, , SubCUTAneous, ONCE, Theo Cordova Smoker, CRNA FH:  
Family History Family history unknown: Yes  
 
Social:  
Social History Socioeconomic History  Marital status: SINGLE Spouse name: Not on file  Number of children: Not on file  Years of education: Not on file  Highest education level: Not on file Tobacco Use  Smoking status: Former Smoker  Smokeless tobacco: Former User Quit date: 6/1/2016 Substance and Sexual Activity  Alcohol use: No  
 Drug use: No  
 Sexual activity: Yes  
  Partners: Female Surgical H:  
Past Surgical History:  
Procedure Laterality Date  HX APPENDECTOMY    
 
 
ROS: negative Physical Exam:  
Visit Vitals /86 Pulse 72 Temp 97.8 °F (36.6 °C) Resp 18 Ht 5' 9\" (1.753 m) Wt 59.4 kg (131 lb) SpO2 99% BMI 19.35 kg/m² General appearance: alert, no distress Eyes: pupils equal and reactive, extraocular eye movements intact Nodes: No gross adenopathy in neck. Skin: no spider angiomata, jaundice, palmar erythema Respiratory: clear to auscultation bilaterally Cardiovascular: regular heart rate, no murmurs, no JVD, normal rate and regular rhythm Abdomen: soft, non-tender, liver not enlarged, spleen not palpable, no obvious ascites Extremities: no muscle wasting, no gross arthritic changes Neurologic: alert and oriented, cranial nerves grossly intact, no asterixis Labs: No results found for this or any previous visit (from the past 24 hour(s)). Imp/ Plan: Will proceed with colonoscopy as planned. Risk benefits alternative including but not limited to infection, bleeding, perforation of viscous, allergic reaction and resultant morbidity and mortality was discussed. Chance of missing a significant lesion due to various reasons were discussed. Milagros Dave MD 
[facility] [place] [id number]

## 2019-01-30 NOTE — PROCEDURES
N[place]ed  [facility], [address]      Brief Procedure Note    Boo Garcia  1950  [id number]    Date of Procedure: 1/30/2019    Preoperative diagnosis: COLON CANCER SCREENING, PERSONAL HISTORY OF COLON POLYPS, FAMILY HISTORY OF COLON CANCER, SEIZURE DISORDER, ELEVATED BLOOD PRESSURE    Postoperative diagnosis:  diverticolosis, rectal polyp    Type of Anesthesia: MAC (monitered anesthesia care)    Description of Findings: same as post op dx    Procedure: Procedure(s):  COLONOSCOPY w bx's    :  Dr. Masha Snog MD    Assistant(s): [unfilled]    Type of Anesthesia:MAC     EBL:None    Specimens:   ID Type Source Tests Collected by Time Destination   1 : rectal polyp Preservative Colon  Jillian Bowman MD 1/30/2019 1041 Pathology       Findings: See printed and scanned procedure note    Complications: None    Dr. Masha Song MD  1/30/2019  10:45 AM

## 2019-01-30 NOTE — ANESTHESIA POSTPROCEDURE EVALUATION
Procedure(s): 
COLONOSCOPY w bx's. Anesthesia Post Evaluation Multimodal analgesia: multimodal analgesia used between 6 hours prior to anesthesia start to PACU discharge Patient location during evaluation: bedside Patient participation: complete - patient participated Level of consciousness: awake Pain management: adequate Airway patency: patent Anesthetic complications: no 
Cardiovascular status: stable Respiratory status: acceptable Hydration status: acceptable Post anesthesia nausea and vomiting:  controlled Visit Vitals /72 Pulse 66 Temp 36.7 °C (98 °F) Resp 16 Ht 5' 9\" (1.753 m) Wt 59.4 kg (131 lb) SpO2 100% BMI 19.35 kg/m²

## 2019-01-30 NOTE — ANESTHESIA PREPROCEDURE EVALUATION
Anesthetic History No history of anesthetic complications Review of Systems / Medical History Patient summary reviewed and pertinent labs reviewed Pulmonary Within defined limits Neuro/Psych  
 
seizures: well controlled Cardiovascular Within defined limits GI/Hepatic/Renal 
Within defined limits Endo/Other Within defined limits Other Findings Physical Exam 
 
Airway Mallampati: II 
TM Distance: 4 - 6 cm Neck ROM: normal range of motion Mouth opening: Normal 
 
 Cardiovascular Dental 
 
Dentition: Full upper dentures and Poor dentition Comments: The patient has very poor dentition. Loose, broken, and or missing teeth. I explained the risk of dental damage and or loss. The patient understands and accepts these risks. Pulmonary Abdominal 
GI exam deferred Other Findings Anesthetic Plan ASA: 2 Anesthesia type: MAC Anesthetic plan and risks discussed with: Patient

## 2019-01-30 NOTE — PERIOP NOTES
Late entry Patient confirmed by two identifiers with discharge instructions prior too being provided to patient and niece. Patient armband removed and shredded.

## 2019-01-30 NOTE — DISCHARGE INSTRUCTIONS
Colonoscopy: What to Expect at [address] [place] [address]  After you have a colonoscopy, you will stay at the clinic for 1 to 2 hours until the medicines wear off. Then you can go home. But you will need to arrange for a ride. Your doctor will tell you when you can eat and do your other usual activities. Your doctor will talk to you about when you will need your next colonoscopy. Your doctor can help you decide how often you need to be checked. This will depend on the results of your test and your risk for colorectal cancer. After the test, you may be bloated or have gas pains. You may need to pass gas. If a biopsy was done or a polyp was removed, you may have streaks of blood in your stool (feces) for a few days. Problems such as heavy rectal bleeding may not occur until several weeks after the test. This isn't common. But it can happen after polyps are removed. This care sheet gives you a general idea about how long it will take for you to recover. But each person recovers at a different pace. Follow the steps below to get better as quickly as possible. How can you care for yourself at home? Activity    · Rest when you feel tired.     · You can do your normal activities when it feels okay to do so. Diet    · Follow your doctor's directions for eating.     · Unless your doctor has told you not to, drink plenty of fluids. This helps to replace the fluids that were lost during the colon prep.     · Do not drink alcohol. Medicines    · Your doctor will tell you if and when you can restart your medicines. He or she will also give you instructions about taking any new medicines.     · If you take blood thinners, such as warfarin (Coumadin), clopidogrel (Plavix), or aspirin, be sure to talk to your doctor. He or she will tell you if and when to start taking those medicines again.  Make sure that you understand exactly what your doctor wants you to do.     · If polyps were removed or a biopsy was done during the test, your doctor may tell you not to take aspirin or other anti-inflammatory medicines for a few days. These include ibuprofen (Advil, Motrin) and naproxen (Aleve). Other instructions    · For your safety, do not drive or operate machinery until the medicine wears off and you can think clearly. Your doctor may tell you not to drive or operate machinery until the day after your test.     · Do not sign legal documents or make major decisions until the medicine wears off and you can think clearly. The anesthesia can make it hard for you to fully understand what you are agreeing to. Follow-up care is a key part of your treatment and safety. Be sure to make and go to all appointments, and call your doctor if you are having problems. It's also a good idea to know your test results and keep a list of the medicines you take. When should you call for help? Call 911 anytime you think you may need emergency care. For example, call if:    · You passed out (lost consciousness).     · You pass maroon or bloody stools.     · You have trouble breathing.    Call your doctor now or seek immediate medical care if:    · You have pain that does not get better after you take pain medicine.     · You are sick to your stomach or cannot drink fluids.     · You have new or worse belly pain.     · You have blood in your stools.     · You have a fever.     · You cannot pass stools or gas.    Watch closely for changes in your health, and be sure to contact your doctor if you have any problems. Where can you learn more? Go to http://bradley-leilani.info/. Enter E264 in the search box to learn more about \"Colonoscopy: What to Expect at Home. \"  Current as of: March 27, 2018  Content Version: 11.9  © 5579-8250 [employer]. Care instructions adapted under license by [employer] (which disclaims liability or warranty for this information).  If you have questions about a medical condition or this instruction, always ask your healthcare professional. [place] [address] any warranty or liability for your use of this information. Colon Polyps: Care Instructions  Your Care Instructions    Colon polyps are growths in the colon or the rectum. The cause of most colon polyps is not known, and most people who get them do not have any problems. But a certain kind can turn into cancer. For this reason, regular testing for colon polyps is important for people age 48 and older and anyone who has an increased risk for colon cancer. Polyps are usually found through routine colon cancer screening tests. Although most colon polyps are not cancerous, they are usually removed and then tested for cancer. Screening for colon cancer saves lives because the cancer can usually be cured if it is caught early. If you have a polyp that is the type that can turn into cancer, you may need more tests to examine your entire colon. The doctor will remove any other polyps that he or she finds, and you will be tested more often. Follow-up care is a key part of your treatment and safety. Be sure to make and go to all appointments, and call your doctor if you are having problems. It's also a good idea to know your test results and keep a list of the medicines you take. How can you care for yourself at home? Regular exams to look for colon polyps are the best way to prevent polyps from turning into colon cancer. These can include stool tests, sigmoidoscopy, colonoscopy, and CT colonography. Talk with your doctor about a testing schedule that is right for you. To prevent polyps  There is no home treatment that can prevent colon polyps. But these steps may help lower your risk for cancer. · Stay active. Being active can help you get to and stay at a healthy weight. Try to exercise on most days of the week. Walking is a good choice. · Eat well.  Choose a variety of vegetables, fruits, legumes (such as peas and beans), fish, poultry, and whole grains. · Do not smoke. If you need help quitting, talk to your doctor about stop-smoking programs and medicines. These can increase your chances of quitting for good. · If you drink alcohol, limit how much you drink. Limit alcohol to 2 drinks a day for men and 1 drink a day for women. When should you call for help? Call your doctor now or seek immediate medical care if:    · You have severe belly pain.     · Your stools are maroon or very bloody.    Watch closely for changes in your health, and be sure to contact your doctor if:    · You have a fever.     · You have nausea or vomiting.     · You have a change in bowel habits (new constipation or diarrhea).     · Your symptoms get worse or are not improving as expected. Where can you learn more? Go to http://bradley-leilani.info/. Enter 95 [id number] in the search box to learn more about \"Colon Polyps: Care Instructions. \"  Current as of: March 27, 2018  Content Version: 11.9  © 2580-6803 [employer]. Care instructions adapted under license by [employer] (which disclaims liability or warranty for this information). If you have questions about a medical condition or this instruction, always ask your healthcare professional. [place] [address] any warranty or liability for your use of this information. Hemorrhoids: Care Instructions  Your Care Instructions    Hemorrhoids are enlarged veins that develop in the anal canal. Bleeding during bowel movements, itching, swelling, and rectal pain are the most common symptoms. They can be uncomfortable at times, but hemorrhoids rarely are a serious problem. You can treat most hemorrhoids with simple changes to your diet and bowel habits. These changes include eating more fiber and not straining to pass stools. Most hemorrhoids do not need surgery or other treatment unless they are very large and painful or bleed a lot.   Follow-up care is a key part of your treatment and safety. Be sure to make and go to all appointments, and call your doctor if you are having problems. It's also a good idea to know your test results and keep a list of the medicines you take. How can you care for yourself at home? · Sit in a few inches of warm water (sitz bath) 3 times a day and after bowel movements. The warm water helps with pain and itching. · Put ice on your anal area several times a day for 10 minutes at a time. Put a thin cloth between the ice and your skin. Follow this by placing a warm, wet towel on the area for another 10 to 20 minutes. · Take pain medicines exactly as directed. ? If the doctor gave you a prescription medicine for pain, take it as prescribed. ? If you are not taking a prescription pain medicine, ask your doctor if you can take an over-the-counter medicine. · Keep the anal area clean, but be gentle. Use water and a fragrance-free soap, such as [place], or use baby wipes or medicated pads, such as Tucks. · Wear cotton underwear and loose clothing to decrease moisture in the anal area. · Eat more fiber. Include foods such as whole-grain breads and cereals, raw vegetables, raw and dried fruits, and beans. · Drink plenty of fluids, enough so that your urine is light yellow or clear like water. If you have kidney, heart, or liver disease and have to limit fluids, talk with your doctor before you increase the amount of fluids you drink. · Use a stool softener that contains bran or psyllium. You can save money by buying bran or psyllium (available in bulk at most health food stores) and sprinkling it on foods or stirring it into fruit juice. Or you can use a product such as Metamucil or Hydrocil. · Practice healthy bowel habits. ? Go to the bathroom as soon as you have the urge. ? Avoid straining to pass stools. Relax and give yourself time to let things happen naturally. ? Do not hold your breath while passing stools. ? Do not read while sitting on the toilet. Get off the toilet as soon as you have finished. · Take your medicines exactly as prescribed. Call your doctor if you think you are having a problem with your medicine. When should you call for help? Call 911 anytime you think you may need emergency care. For example, call if:    · You pass maroon or very bloody stools.    Call your doctor now or seek immediate medical care if:    · You have increased pain.     · You have increased bleeding.    Watch closely for changes in your health, and be sure to contact your doctor if:    · Your symptoms have not improved after 3 or 4 days. Where can you learn more? Go to http://bradley-leilani.info/. Enter F228 in the search box to learn more about \"Hemorrhoids: Care Instructions. \"  Current as of: March 27, 2018  Content Version: 11.9  © 3206-2360 [employer]. Care instructions adapted under license by [employer] (which disclaims liability or warranty for this information). If you have questions about a medical condition or this instruction, always ask your healthcare professional. [place] [address] any warranty or liability for your use of this information. DISCHARGE SUMMARY from Nurse    PATIENT INSTRUCTIONS:    After general anesthesia or intravenous sedation, for 24 hours or while taking prescription Narcotics:  · Limit your activities  · Do not drive and operate hazardous machinery  · Do not make important personal or business decisions  · Do  not drink alcoholic beverages  · If you have not urinated within 8 hours after discharge, please contact your surgeon on call.     Report the following to your surgeon:  · Excessive pain, swelling, redness or odor of or around the surgical area  · Temperature over 100.5  · Nausea and vomiting lasting longer than 4 hours or if unable to take medications  · Any signs of decreased circulation or nerve impairment to extremity: change in color, persistent  numbness, tingling, coldness or increase pain  · Any questions    *  Please give a list of your current medications to your Primary Care Provider. *  Please update this list whenever your medications are discontinued, doses are      changed, or new medications (including over-the-counter products) are added. *  Please carry medication information at all times in case of emergency situations. These are general instructions for a healthy lifestyle:    No smoking/ No tobacco products/ Avoid exposure to second hand smoke  Surgeon General's Warning:  Quitting smoking now greatly reduces serious risk to your health. Obesity, smoking, and sedentary lifestyle greatly increases your risk for illness    A healthy diet, regular physical exercise & weight monitoring are important for maintaining a healthy lifestyle    You may be retaining fluid if you have a history of heart failure or if you experience any of the following symptoms:  Weight gain of 3 pounds or more overnight or 5 pounds in a week, increased swelling in our hands or feet or shortness of breath while lying flat in bed. Please call your doctor as soon as you notice any of these symptoms; do not wait until your next office visit. Recognize signs and symptoms of STROKE:    F-face looks uneven    A-arms unable to move or move unevenly    S-speech slurred or non-existent    T-time-call 911 as soon as signs and symptoms begin-DO NOT go       Back to bed or wait to see if you get better-TIME IS BRAIN. Warning Signs of HEART ATTACK     Call 911 if you have these symptoms:   Chest discomfort. Most heart attacks involve discomfort in the center of the chest that lasts more than a few minutes, or that goes away and comes back. It can feel like uncomfortable pressure, squeezing, fullness, or pain.  Discomfort in other areas of the upper body. Symptoms can include pain or discomfort in one or both arms, the back, neck, jaw, or stomach.    Shortness of breath with or without chest discomfort.  Other signs may include breaking out in a cold sweat, nausea, or lightheadedness. Don't wait more than five minutes to call 911 - MINUTES MATTER! Fast action can save your life. Calling 911 is almost always the fastest way to get lifesaving treatment. Emergency Medical Services staff can begin treatment when they arrive -- up to an hour sooner than if someone gets to the hospital by car. The discharge information has been reviewed with the patient/family. The patient/family verbalized understanding. Discharge medications reviewed with the patient/family and appropriate educational materials and side effects teaching were provided.   ___________________________________________________________________________________________________________________________________

## 2019-07-20 ENCOUNTER — APPOINTMENT (OUTPATIENT)
Dept: GENERAL RADIOLOGY | Age: 69
DRG: 481 | End: 2019-07-20
Attending: EMERGENCY MEDICINE
Payer: MEDICARE

## 2019-07-20 ENCOUNTER — HOSPITAL ENCOUNTER (INPATIENT)
Age: 69
LOS: 5 days | Discharge: NURSING, REHAB OR LONG-TERM CARE | DRG: 481 | End: 2019-07-25
Attending: EMERGENCY MEDICINE | Admitting: FAMILY MEDICINE
Payer: MEDICARE

## 2019-07-20 DIAGNOSIS — M25.511 CHRONIC RIGHT SHOULDER PAIN: ICD-10-CM

## 2019-07-20 DIAGNOSIS — S72.141D CLOSED DISPLACED INTERTROCHANTERIC FRACTURE OF RIGHT FEMUR WITH ROUTINE HEALING, SUBSEQUENT ENCOUNTER: ICD-10-CM

## 2019-07-20 DIAGNOSIS — G89.18 POST-OP PAIN: ICD-10-CM

## 2019-07-20 DIAGNOSIS — G89.29 CHRONIC RIGHT SHOULDER PAIN: ICD-10-CM

## 2019-07-20 DIAGNOSIS — S72.141A CLOSED DISPLACED INTERTROCHANTERIC FRACTURE OF RIGHT FEMUR, INITIAL ENCOUNTER (HCC): Primary | ICD-10-CM

## 2019-07-20 PROBLEM — S72.91XA FEMUR FRACTURE, RIGHT (HCC): Status: ACTIVE | Noted: 2019-07-20

## 2019-07-20 LAB
ALBUMIN SERPL-MCNC: 3.9 G/DL (ref 3.4–5)
ALBUMIN/GLOB SERPL: 1.4 {RATIO} (ref 0.8–1.7)
ALP SERPL-CCNC: 86 U/L (ref 45–117)
ALT SERPL-CCNC: 24 U/L (ref 16–61)
ANION GAP SERPL CALC-SCNC: 6 MMOL/L (ref 3–18)
APPEARANCE UR: CLEAR
AST SERPL-CCNC: 16 U/L (ref 10–38)
ATRIAL RATE: 73 BPM
BASOPHILS # BLD: 0 K/UL (ref 0–0.1)
BASOPHILS NFR BLD: 0 % (ref 0–2)
BILIRUB SERPL-MCNC: 0.3 MG/DL (ref 0.2–1)
BILIRUB UR QL: NEGATIVE
BUN SERPL-MCNC: 17 MG/DL (ref 7–18)
BUN/CREAT SERPL: 18 (ref 12–20)
CALCIUM SERPL-MCNC: 8.6 MG/DL (ref 8.5–10.1)
CALCULATED P AXIS, ECG09: 72 DEGREES
CALCULATED R AXIS, ECG10: -39 DEGREES
CALCULATED T AXIS, ECG11: 45 DEGREES
CARBAMAZEPINE SERPL-MCNC: 11.6 UG/ML (ref 4–12)
CHLORIDE SERPL-SCNC: 110 MMOL/L (ref 100–111)
CK MB CFR SERPL CALC: NORMAL % (ref 0–4)
CK MB SERPL-MCNC: <1 NG/ML (ref 5–25)
CK SERPL-CCNC: 133 U/L (ref 39–308)
CO2 SERPL-SCNC: 28 MMOL/L (ref 21–32)
COLOR UR: YELLOW
CREAT SERPL-MCNC: 0.93 MG/DL (ref 0.6–1.3)
DIAGNOSIS, 93000: NORMAL
DIFFERENTIAL METHOD BLD: ABNORMAL
EOSINOPHIL # BLD: 0.1 K/UL (ref 0–0.4)
EOSINOPHIL NFR BLD: 1 % (ref 0–5)
ERYTHROCYTE [DISTWIDTH] IN BLOOD BY AUTOMATED COUNT: 12.8 % (ref 11.6–14.5)
GLOBULIN SER CALC-MCNC: 2.8 G/DL (ref 2–4)
GLUCOSE SERPL-MCNC: 136 MG/DL (ref 74–99)
GLUCOSE UR STRIP.AUTO-MCNC: NEGATIVE MG/DL
HCT VFR BLD AUTO: 37.7 % (ref 36–48)
HGB BLD-MCNC: 13.1 G/DL (ref 13–16)
HGB UR QL STRIP: NEGATIVE
INR PPP: 1.1 (ref 0.8–1.2)
KETONES UR QL STRIP.AUTO: 15 MG/DL
LEUKOCYTE ESTERASE UR QL STRIP.AUTO: NEGATIVE
LYMPHOCYTES # BLD: 4.2 K/UL (ref 0.9–3.6)
LYMPHOCYTES NFR BLD: 50 % (ref 21–52)
MCH RBC QN AUTO: 30.8 PG (ref 24–34)
MCHC RBC AUTO-ENTMCNC: 34.7 G/DL (ref 31–37)
MCV RBC AUTO: 88.7 FL (ref 74–97)
MONOCYTES # BLD: 0.3 K/UL (ref 0.05–1.2)
MONOCYTES NFR BLD: 3 % (ref 3–10)
NEUTS SEG # BLD: 3.9 K/UL (ref 1.8–8)
NEUTS SEG NFR BLD: 46 % (ref 40–73)
NITRITE UR QL STRIP.AUTO: NEGATIVE
P-R INTERVAL, ECG05: 160 MS
PH UR STRIP: 5.5 [PH] (ref 5–8)
PLATELET # BLD AUTO: 156 K/UL (ref 135–420)
PMV BLD AUTO: 9.9 FL (ref 9.2–11.8)
POTASSIUM SERPL-SCNC: 3.7 MMOL/L (ref 3.5–5.5)
PROT SERPL-MCNC: 6.7 G/DL (ref 6.4–8.2)
PROT UR STRIP-MCNC: NEGATIVE MG/DL
PROTHROMBIN TIME: 14.2 SEC (ref 11.5–15.2)
Q-T INTERVAL, ECG07: 378 MS
QRS DURATION, ECG06: 84 MS
QTC CALCULATION (BEZET), ECG08: 416 MS
RBC # BLD AUTO: 4.25 M/UL (ref 4.7–5.5)
SODIUM SERPL-SCNC: 144 MMOL/L (ref 136–145)
SP GR UR REFRACTOMETRY: 1.03 (ref 1–1.03)
TROPONIN I SERPL-MCNC: <0.02 NG/ML (ref 0–0.04)
UROBILINOGEN UR QL STRIP.AUTO: 0.2 EU/DL (ref 0.2–1)
VENTRICULAR RATE, ECG03: 73 BPM
WBC # BLD AUTO: 8.5 K/UL (ref 4.6–13.2)

## 2019-07-20 PROCEDURE — 81003 URINALYSIS AUTO W/O SCOPE: CPT

## 2019-07-20 PROCEDURE — 36415 COLL VENOUS BLD VENIPUNCTURE: CPT

## 2019-07-20 PROCEDURE — 74011000250 HC RX REV CODE- 250: Performed by: FAMILY MEDICINE

## 2019-07-20 PROCEDURE — 71045 X-RAY EXAM CHEST 1 VIEW: CPT

## 2019-07-20 PROCEDURE — 80156 ASSAY CARBAMAZEPINE TOTAL: CPT

## 2019-07-20 PROCEDURE — 74011250637 HC RX REV CODE- 250/637: Performed by: FAMILY MEDICINE

## 2019-07-20 PROCEDURE — 96374 THER/PROPH/DIAG INJ IV PUSH: CPT

## 2019-07-20 PROCEDURE — 82550 ASSAY OF CK (CPK): CPT

## 2019-07-20 PROCEDURE — 99285 EMERGENCY DEPT VISIT HI MDM: CPT

## 2019-07-20 PROCEDURE — 85610 PROTHROMBIN TIME: CPT

## 2019-07-20 PROCEDURE — 80053 COMPREHEN METABOLIC PANEL: CPT

## 2019-07-20 PROCEDURE — 74011250636 HC RX REV CODE- 250/636: Performed by: EMERGENCY MEDICINE

## 2019-07-20 PROCEDURE — 73502 X-RAY EXAM HIP UNI 2-3 VIEWS: CPT

## 2019-07-20 PROCEDURE — 65270000029 HC RM PRIVATE

## 2019-07-20 PROCEDURE — 93005 ELECTROCARDIOGRAM TRACING: CPT

## 2019-07-20 PROCEDURE — 74011250636 HC RX REV CODE- 250/636: Performed by: FAMILY MEDICINE

## 2019-07-20 PROCEDURE — 85025 COMPLETE CBC W/AUTO DIFF WBC: CPT

## 2019-07-20 RX ORDER — PRAVASTATIN SODIUM 20 MG/1
40 TABLET ORAL
Status: DISCONTINUED | OUTPATIENT
Start: 2019-07-20 | End: 2019-07-26 | Stop reason: HOSPADM

## 2019-07-20 RX ORDER — LATANOPROST 50 UG/ML
1 SOLUTION/ DROPS OPHTHALMIC
Status: DISCONTINUED | OUTPATIENT
Start: 2019-07-20 | End: 2019-07-26 | Stop reason: HOSPADM

## 2019-07-20 RX ORDER — FACIAL-BODY WIPES
10 EACH TOPICAL DAILY PRN
Status: DISCONTINUED | OUTPATIENT
Start: 2019-07-20 | End: 2019-07-26 | Stop reason: HOSPADM

## 2019-07-20 RX ORDER — DOCUSATE SODIUM 100 MG/1
100 CAPSULE, LIQUID FILLED ORAL 2 TIMES DAILY
Status: DISCONTINUED | OUTPATIENT
Start: 2019-07-20 | End: 2019-07-26 | Stop reason: HOSPADM

## 2019-07-20 RX ORDER — FENTANYL CITRATE 50 UG/ML
50 INJECTION, SOLUTION INTRAMUSCULAR; INTRAVENOUS
Status: COMPLETED | OUTPATIENT
Start: 2019-07-20 | End: 2019-07-20

## 2019-07-20 RX ORDER — HYDROMORPHONE HYDROCHLORIDE 1 MG/ML
1 INJECTION, SOLUTION INTRAMUSCULAR; INTRAVENOUS; SUBCUTANEOUS
Status: DISCONTINUED | OUTPATIENT
Start: 2019-07-20 | End: 2019-07-23

## 2019-07-20 RX ORDER — HEPARIN SODIUM 5000 [USP'U]/ML
5000 INJECTION, SOLUTION INTRAVENOUS; SUBCUTANEOUS EVERY 8 HOURS
Status: DISCONTINUED | OUTPATIENT
Start: 2019-07-20 | End: 2019-07-20

## 2019-07-20 RX ORDER — CARBAMAZEPINE 200 MG/1
400 TABLET, EXTENDED RELEASE ORAL EVERY 12 HOURS
Status: DISCONTINUED | OUTPATIENT
Start: 2019-07-20 | End: 2019-07-26 | Stop reason: HOSPADM

## 2019-07-20 RX ORDER — FAMOTIDINE 20 MG/1
20 TABLET, FILM COATED ORAL 2 TIMES DAILY
Status: DISCONTINUED | OUTPATIENT
Start: 2019-07-20 | End: 2019-07-25

## 2019-07-20 RX ORDER — ACETAMINOPHEN 325 MG/1
650 TABLET ORAL
Status: DISCONTINUED | OUTPATIENT
Start: 2019-07-20 | End: 2019-07-26 | Stop reason: HOSPADM

## 2019-07-20 RX ADMIN — FENTANYL CITRATE 50 MCG: 50 INJECTION INTRAMUSCULAR; INTRAVENOUS at 14:52

## 2019-07-20 RX ADMIN — LATANOPROST 1 DROP: 50 SOLUTION OPHTHALMIC at 21:32

## 2019-07-20 RX ADMIN — CARBAMAZEPINE 400 MG: 200 TABLET, EXTENDED RELEASE ORAL at 21:16

## 2019-07-20 RX ADMIN — HYDROMORPHONE HYDROCHLORIDE 1 MG: 1 INJECTION, SOLUTION INTRAMUSCULAR; INTRAVENOUS; SUBCUTANEOUS at 21:22

## 2019-07-20 RX ADMIN — PRAVASTATIN SODIUM 40 MG: 20 TABLET ORAL at 21:16

## 2019-07-20 RX ADMIN — DOCUSATE SODIUM 100 MG: 100 CAPSULE, LIQUID FILLED ORAL at 19:09

## 2019-07-20 RX ADMIN — FAMOTIDINE 20 MG: 20 TABLET ORAL at 19:09

## 2019-07-20 RX ADMIN — FENTANYL CITRATE 50 MCG: 50 INJECTION INTRAMUSCULAR; INTRAVENOUS at 12:48

## 2019-07-20 NOTE — ED NOTES
TRANSFER - OUT REPORT:    Verbal report given to Faustino Hicks RN(name) on Nancy Villarreal  being transferred to medical 517(unit) for routine progression of care       Report consisted of patients Situation, Background, Assessment and   Recommendations(SBAR). Information from the following report(s) SBAR, ED Summary, [facility] and Recent Results was reviewed with the receiving nurse. Lines:   Peripheral IV 07/20/19 Left Antecubital (Active)   Site Assessment Clean, dry, & intact 7/20/2019  2:20 PM   Phlebitis Assessment 0 7/20/2019  2:20 PM   Infiltration Assessment 0 7/20/2019  2:20 PM   Dressing Status Clean, dry, & intact 7/20/2019  2:20 PM   Dressing Type 4 X 4;Tape;Transparent 7/20/2019  2:20 PM   Hub Color/Line Status Green;Patent; Flushed 7/20/2019  2:20 PM   Action Taken Blood drawn 7/20/2019  2:20 PM   Alcohol Cap Used No 7/20/2019  2:20 PM        Opportunity for questions and clarification was provided.       Patient transported with:   transport

## 2019-07-20 NOTE — ED NOTES
Pt lying on stretcher, reports decreased pain at this time, no distress noted, Pt remains on monitor and call bell within reach, denies needing bathroom,  room safety check completed, informed of status, awaiting results, will continue to monitor.

## 2019-07-20 NOTE — PROGRESS NOTES
Report given by Samreen Woods RN from ED regarding patients current medical situation for continuation of care. Tracy Goldberg

## 2019-07-20 NOTE — CONSULTS
Patient: Nahomy Vernon                MRN: [id number]       SSN: [id number]  YOB: 1950        AGE: 77 y.o. SEX: male  There is no height or weight on file to calculate BMI. PCP: Lin Kaur MD  07/20/19    Chief Complaint: Right hip pain    HPI: Nahomy Vernon is a 77year old male with the below noted PMH who sustained a mechanical fall at home this AM while making breakfast.  Lost his balance and fell onto his right hip. No LOC, no head trauma. No seizure activity to cause his fall. Was unable to ambulate, brought into the hospital.  In the ER noted to have a right hip IT fracture. Orthopedic consult placed. No other complaints of pain. Past Medical History:   Diagnosis Date    Glaucoma     History of poliomyelitis     Hyperlipidemia     Seizures (HCC)        Family History   Family history unknown: Yes       Current Facility-Administered Medications   Medication Dose Route Frequency Provider Last Rate Last Dose    fentaNYL citrate (PF) injection 50 mcg  50 mcg IntraVENous NOW Mely Barber MD         Current Outpatient Medications   Medication Sig Dispense Refill    latanoprost (XALATAN) 0.005 % ophthalmic solution place 1 drop into both eyes at bedtime 2.5 mL 1    carBAMazepine XR (TEGRETOL XR) 400 mg SR tablet take 1 tablet by mouth twice a day 60 Tab 11    acetaminophen (TYLENOL) 650 mg CR tablet Take 1 Tab by mouth as needed. 90 Tab 1    pravastatin (PRAVACHOL) 40 mg tablet Take 1 Tab by mouth nightly.  30 Tab 11       No Known Allergies    Past Surgical History:   Procedure Laterality Date    COLONOSCOPY N/A 1/30/2019    COLONOSCOPY w bx's performed by Jack Cortez MD at [facility]COPY    HX APPENDECTOMY         Social History     Socioeconomic History    Marital status: SINGLE     Spouse name: Not on file    Number of children: Not on file    Years of education: Not on file    Highest education level: Not on file   Occupational History    Not on file   Social Needs    Financial resource strain: Not on file    Food insecurity:     Worry: Not on file     Inability: Not on file    Transportation needs:     Medical: Not on file     Non-medical: Not on file   Tobacco Use    Smoking status: Former Smoker    Smokeless tobacco: Former User     Quit date: 6/1/2016   Substance and Sexual Activity    Alcohol use: No    Drug use: No    Sexual activity: Yes     Partners: Female   Lifestyle    Physical activity:     Days per week: Not on file     Minutes per session: Not on file    Stress: Not on file   Relationships    Social connections:     Talks on phone: Not on file     Gets together: Not on file     Attends [employer] service: Not on file     Active member of club or organization: Not on file     Attends meetings of clubs or organizations: Not on file     Relationship status: Not on file    Intimate partner violence:     Fear of current or ex partner: Not on file     Emotionally abused: Not on file     Physically abused: Not on file     Forced sexual activity: Not on file   Other Topics Concern    Not on file   Social History Narrative    Not on file       REVIEW OF SYSTEMS:      CON: negative for recent weight loss/gain, fever, or chills  EYE: negative for double or blurry vision  ENT: negative for hoarseness  RS:   negative for cough, URI, SOB  CV:  negative for chest pain, palpitations  GI:    negative for blood in stool, nausea/vomiting  :  negative for blood in urine  MS: As per HPI  Other systems reviewed and noted below. PHYSICAL EXAMINATION:  Visit Vitals  /67   Pulse 78   Temp 98.1 °F (36.7 °C)   Resp 12   SpO2 100%     There is no height or weight on file to calculate BMI. GENERAL: Alert and oriented x3, in no acute distress, well-developed, well-nourished. HEENT: Normocephalic, atraumatic. RESP: Non labored breathing with equal chest rise on inspiration. CV: Well perfused extremities.   No cyanosis or clubbing noted.  ABDOMEN: Soft, non-tender, non-distended. Right leg short, externally rotated. No open wounds. Pain with any right hip movement. SILT/NVI distally. Recent Results (from the past 24 hour(s))   CBC WITH AUTOMATED DIFF    Collection Time: 07/20/19 12:25 PM   Result Value Ref Range    WBC 8.5 4.6 - 13.2 K/uL    RBC 4.25 (L) 4.70 - 5.50 M/uL    HGB 13.1 13.0 - 16.0 g/dL    HCT 37.7 36.0 - 48.0 %    MCV 88.7 74.0 - 97.0 FL    MCH 30.8 24.0 - 34.0 PG    MCHC 34.7 31.0 - 37.0 g/dL    RDW 12.8 11.6 - 14.5 %    PLATELET 218 162 - 332 K/uL    MPV 9.9 9.2 - 11.8 FL    NEUTROPHILS 46 40 - 73 %    LYMPHOCYTES 50 21 - 52 %    MONOCYTES 3 3 - 10 %    EOSINOPHILS 1 0 - 5 %    BASOPHILS 0 0 - 2 %    ABS. NEUTROPHILS 3.9 1.8 - 8.0 K/UL    ABS. LYMPHOCYTES 4.2 (H) 0.9 - 3.6 K/UL    ABS. MONOCYTES 0.3 0.05 - 1.2 K/UL    ABS. EOSINOPHILS 0.1 0.0 - 0.4 K/UL    ABS. BASOPHILS 0.0 0.0 - 0.1 K/UL    DF AUTOMATED     METABOLIC PANEL, COMPREHENSIVE    Collection Time: 07/20/19 12:25 PM   Result Value Ref Range    Sodium 144 136 - 145 mmol/L    Potassium 3.7 3.5 - 5.5 mmol/L    Chloride 110 100 - 111 mmol/L    CO2 28 21 - 32 mmol/L    Anion gap 6 3.0 - 18 mmol/L    Glucose 136 (H) 74 - 99 mg/dL    BUN 17 7.0 - 18 MG/DL    Creatinine 0.93 0.6 - 1.3 MG/DL    BUN/Creatinine ratio 18 12 - 20      GFR est AA >60 >60 ml/min/1.73m2    GFR est non-AA >60 >60 ml/min/1.73m2    Calcium 8.6 8.5 - 10.1 MG/DL    Bilirubin, total 0.3 0.2 - 1.0 MG/DL    ALT (SGPT) 24 16 - 61 U/L    AST (SGOT) 16 10 - 38 U/L    Alk.  phosphatase 86 45 - 117 U/L    Protein, total 6.7 6.4 - 8.2 g/dL    Albumin 3.9 3.4 - 5.0 g/dL    Globulin 2.8 2.0 - 4.0 g/dL    A-G Ratio 1.4 0.8 - 1.7     PROTHROMBIN TIME + INR    Collection Time: 07/20/19 12:25 PM   Result Value Ref Range    Prothrombin time 14.2 11.5 - 15.2 sec    INR 1.1 0.8 - 1.2     EKG, 12 LEAD, INITIAL    Collection Time: 07/20/19  2:14 PM   Result Value Ref Range    Ventricular Rate 73 BPM    Atrial Rate 73 BPM    P-R Interval 160 ms    QRS Duration 84 ms    Q-T Interval 378 ms    QTC Calculation (Bezet) 416 ms    Calculated P Axis 72 degrees    Calculated R Axis -39 degrees    Calculated T Axis 45 degrees    Diagnosis       Normal sinus rhythm  Left axis deviation  Abnormal ECG  When compared with ECG of 23-MAR-2017 21:06,  No significant change was found  Confirmed by Laura Stratton MD, ----- ([id number]) on 7/20/2019 2:29:08 PM       Imaging:  Right hip x rays show displaced 4 part intertrochanteric hip fracture    A/P  77year old male ambulator s/p fall with right hip IT fracture  -Admit to medicine/hospitalist/PCP  -Clearance per primary team  -PO pain control  -NWB R LE at this time  -Plan for right hip ORIF with CMN tomorrow AM  -Posted  -NPO after midnight  -Hold anticoagulation in preparation for surgery  -Discussed plan with patient and ER physician  -Thank you for consult    Electronically signed by: Curtis Lee MD

## 2019-07-20 NOTE — H&P (VIEW-ONLY)
Patient: Angel Luis Mckeon                MRN: [id number]       SSN: [id number]  YOB: 1950        AGE: 77 y.o. SEX: male  There is no height or weight on file to calculate BMI. PCP: Gricelda Palumbo MD  07/20/19    Chief Complaint: Right hip pain    HPI: Angel Luis Mckeon is a 77year old male with the below noted PMH who sustained a mechanical fall at home this AM while making breakfast.  Lost his balance and fell onto his right hip. No LOC, no head trauma. No seizure activity to cause his fall. Was unable to ambulate, brought into the hospital.  In the ER noted to have a right hip IT fracture. Orthopedic consult placed. No other complaints of pain. Past Medical History:   Diagnosis Date    Glaucoma     History of poliomyelitis     Hyperlipidemia     Seizures (HCC)        Family History   Family history unknown: Yes       Current Facility-Administered Medications   Medication Dose Route Frequency Provider Last Rate Last Dose    fentaNYL citrate (PF) injection 50 mcg  50 mcg IntraVENous NOW Liliya Garcia MD         Current Outpatient Medications   Medication Sig Dispense Refill    latanoprost (XALATAN) 0.005 % ophthalmic solution place 1 drop into both eyes at bedtime 2.5 mL 1    carBAMazepine XR (TEGRETOL XR) 400 mg SR tablet take 1 tablet by mouth twice a day 60 Tab 11    acetaminophen (TYLENOL) 650 mg CR tablet Take 1 Tab by mouth as needed. 90 Tab 1    pravastatin (PRAVACHOL) 40 mg tablet Take 1 Tab by mouth nightly.  30 Tab 11       No Known Allergies    Past Surgical History:   Procedure Laterality Date    COLONOSCOPY N/A 1/30/2019    COLONOSCOPY w bx's performed by Oscar Covarrubias MD at [facility]    HX APPENDECTOMY         Social History     Socioeconomic History    Marital status: SINGLE     Spouse name: Not on file    Number of children: Not on file    Years of education: Not on file    Highest education level: Not on file   Occupational History    Not on file   Social Needs    Financial resource strain: Not on file    Food insecurity:     Worry: Not on file     Inability: Not on file    Transportation needs:     Medical: Not on file     Non-medical: Not on file   Tobacco Use    Smoking status: Former Smoker    Smokeless tobacco: Former User     Quit date: 6/1/2016   Substance and Sexual Activity    Alcohol use: No    Drug use: No    Sexual activity: Yes     Partners: Female   Lifestyle    Physical activity:     Days per week: Not on file     Minutes per session: Not on file    Stress: Not on file   Relationships    Social connections:     Talks on phone: Not on file     Gets together: Not on file     Attends Nondenominational service: Not on file     Active member of club or organization: Not on file     Attends meetings of clubs or organizations: Not on file     Relationship status: Not on file    Intimate partner violence:     Fear of current or ex partner: Not on file     Emotionally abused: Not on file     Physically abused: Not on file     Forced sexual activity: Not on file   Other Topics Concern    Not on file   Social History Narrative    Not on file       REVIEW OF SYSTEMS:      CON: negative for recent weight loss/gain, fever, or chills  EYE: negative for double or blurry vision  ENT: negative for hoarseness  RS:   negative for cough, URI, SOB  CV:  negative for chest pain, palpitations  GI:    negative for blood in stool, nausea/vomiting  :  negative for blood in urine  MS: As per HPI  Other systems reviewed and noted below. PHYSICAL EXAMINATION:  Visit Vitals  /67   Pulse 78   Temp 98.1 °F (36.7 °C)   Resp 12   SpO2 100%     There is no height or weight on file to calculate BMI. GENERAL: Alert and oriented x3, in no acute distress, well-developed, well-nourished. HEENT: Normocephalic, atraumatic. RESP: Non labored breathing with equal chest rise on inspiration. CV: Well perfused extremities.   No cyanosis or clubbing noted.  ABDOMEN: Soft, non-tender, non-distended. Right leg short, externally rotated. No open wounds. Pain with any right hip movement. SILT/NVI distally. Recent Results (from the past 24 hour(s))   CBC WITH AUTOMATED DIFF    Collection Time: 07/20/19 12:25 PM   Result Value Ref Range    WBC 8.5 4.6 - 13.2 K/uL    RBC 4.25 (L) 4.70 - 5.50 M/uL    HGB 13.1 13.0 - 16.0 g/dL    HCT 37.7 36.0 - 48.0 %    MCV 88.7 74.0 - 97.0 FL    MCH 30.8 24.0 - 34.0 PG    MCHC 34.7 31.0 - 37.0 g/dL    RDW 12.8 11.6 - 14.5 %    PLATELET 150 218 - 123 K/uL    MPV 9.9 9.2 - 11.8 FL    NEUTROPHILS 46 40 - 73 %    LYMPHOCYTES 50 21 - 52 %    MONOCYTES 3 3 - 10 %    EOSINOPHILS 1 0 - 5 %    BASOPHILS 0 0 - 2 %    ABS. NEUTROPHILS 3.9 1.8 - 8.0 K/UL    ABS. LYMPHOCYTES 4.2 (H) 0.9 - 3.6 K/UL    ABS. MONOCYTES 0.3 0.05 - 1.2 K/UL    ABS. EOSINOPHILS 0.1 0.0 - 0.4 K/UL    ABS. BASOPHILS 0.0 0.0 - 0.1 K/UL    DF AUTOMATED     METABOLIC PANEL, COMPREHENSIVE    Collection Time: 07/20/19 12:25 PM   Result Value Ref Range    Sodium 144 136 - 145 mmol/L    Potassium 3.7 3.5 - 5.5 mmol/L    Chloride 110 100 - 111 mmol/L    CO2 28 21 - 32 mmol/L    Anion gap 6 3.0 - 18 mmol/L    Glucose 136 (H) 74 - 99 mg/dL    BUN 17 7.0 - 18 MG/DL    Creatinine 0.93 0.6 - 1.3 MG/DL    BUN/Creatinine ratio 18 12 - 20      GFR est AA >60 >60 ml/min/1.73m2    GFR est non-AA >60 >60 ml/min/1.73m2    Calcium 8.6 8.5 - 10.1 MG/DL    Bilirubin, total 0.3 0.2 - 1.0 MG/DL    ALT (SGPT) 24 16 - 61 U/L    AST (SGOT) 16 10 - 38 U/L    Alk.  phosphatase 86 45 - 117 U/L    Protein, total 6.7 6.4 - 8.2 g/dL    Albumin 3.9 3.4 - 5.0 g/dL    Globulin 2.8 2.0 - 4.0 g/dL    A-G Ratio 1.4 0.8 - 1.7     PROTHROMBIN TIME + INR    Collection Time: 07/20/19 12:25 PM   Result Value Ref Range    Prothrombin time 14.2 11.5 - 15.2 sec    INR 1.1 0.8 - 1.2     EKG, 12 LEAD, INITIAL    Collection Time: 07/20/19  2:14 PM   Result Value Ref Range    Ventricular Rate 73 BPM    Atrial Rate 73 BPM    P-R Interval 160 ms    QRS Duration 84 ms    Q-T Interval 378 ms    QTC Calculation (Bezet) 416 ms    Calculated P Axis 72 degrees    Calculated R Axis -39 degrees    Calculated T Axis 45 degrees    Diagnosis       Normal sinus rhythm  Left axis deviation  Abnormal ECG  When compared with ECG of 23-MAR-2017 21:06,  No significant change was found  Confirmed by Nikki Barahona MD, ----- ([id number]) on 7/20/2019 2:29:08 PM       Imaging:  Right hip x rays show displaced 4 part intertrochanteric hip fracture    A/P  77year old male ambulator s/p fall with right hip IT fracture  -Admit to medicine/hospitalist/PCP  -Clearance per primary team  -PO pain control  -NWB R LE at this time  -Plan for right hip ORIF with CMN tomorrow AM  -Posted  -NPO after midnight  -Hold anticoagulation in preparation for surgery  -Discussed plan with patient and ER physician  -Thank you for consult    Electronically signed by: Lashanda Becker MD

## 2019-07-20 NOTE — ED PROVIDER NOTES
EMERGENCY DEPARTMENT HISTORY AND PHYSICAL EXAM  This was created with voice recognition software and transcription errors may be present. 2:21 PM  Date: 7/20/2019  Patient Name: Tyree Pimentel    History of Presenting Illness     Chief Complaint:    History Provided By:     HPI: Tyree Pimentel is a 77 y.o. male with a past medical history of glaucoma polio hyperlipidemia and seizures presents status post fall. Patient did not seize he was making breakfast had a mechanical fall while waking his oatmeal.  He presents unable to get up from the ground with a shortened and externally rotated right leg. He notes pain with movement he had no loss of consciousness. No numbness or tingling. PCP: Declan Holland MD      Past History     Past Medical History:  Past Medical History:   Diagnosis Date    Glaucoma     History of poliomyelitis     Hyperlipidemia     Seizures ([address]        Past Surgical History:  Past Surgical History:   Procedure Laterality Date    COLONOSCOPY N/A 1/30/2019    COLONOSCOPY w bx's performed by Dario Lesch, MD at [facility]    HX APPENDECTOMY         Family History:  Family History   Family history unknown: Yes       Social History:  Social History     Tobacco Use    Smoking status: Former Smoker    Smokeless tobacco: Former User     Quit date: 6/1/2016   Substance Use Topics    Alcohol use: No    Drug use: No       Allergies:  No Known Allergies    Review of Systems     Review of Systems   Constitutional: Negative for chills, fatigue and fever. HENT: Negative for congestion. All other systems reviewed and are negative. 10 point review of systems otherwise negative unless noted in HPI. Physical Exam       Physical Exam   Constitutional: He is oriented to person, place, and time. He appears well-developed. HENT:   Head: Normocephalic and atraumatic. Eyes: Pupils are equal, round, and reactive to light. EOM are normal.   Neck: Normal range of motion. Neck supple. Cardiovascular: Normal rate, regular rhythm and normal heart sounds. Exam reveals no friction rub. No murmur heard. Pulmonary/Chest: Effort normal and breath sounds normal. No respiratory distress. He has no wheezes. Abdominal: Soft. He exhibits no distension. There is no tenderness. There is no rebound and no guarding. Musculoskeletal: Normal range of motion. Right leg is shortened and externally rotated with good pulse   Neurological: He is alert and oriented to person, place, and time. Skin: Skin is warm and dry. Psychiatric: He has a normal mood and affect. His behavior is normal. Thought content normal.       Diagnostic Study Results     Vital Signs  EKG: EKG shows sinus at 73 with a left axis and normal intervals there is no ST elevation or depression and no hypertrophy    Labs: cbc wnl  inr wnl  Chem wnl  Imaging: Right intertrochanteric fx      Medical Decision Making     ED Course: Progress Notes, Reevaluation, and Consults:      Provider Notes (Medical Decision Making): This is a 75-year-old gentleman who had a mechanical fall has a right hip fracture discussed with Ortho Dr. Garcia Friend as well as his primary care Dr. Joe Jordan         Diagnosis     Clinical Impression: No diagnosis found. Disposition:    Patient's Medications   Start Taking    No medications on file   Continue Taking    ACETAMINOPHEN (TYLENOL) 650 MG CR TABLET    Take 1 Tab by mouth as needed. CARBAMAZEPINE XR (TEGRETOL XR) 400 MG SR TABLET    take 1 tablet by mouth twice a day    LATANOPROST (XALATAN) 0.005 % OPHTHALMIC SOLUTION    place 1 drop into both eyes at bedtime    PRAVASTATIN (PRAVACHOL) 40 MG TABLET    Take 1 Tab by mouth nightly.    These Medications have changed    No medications on file   Stop Taking    No medications on file

## 2019-07-20 NOTE — H&P
History & Physical    Patient: Miguel Rico MRN: [id number]  CSN: [id number]    YOB: 1950  Age: 77 y.o. Sex: male      DOA: 7/20/2019    Chief Complaint:   Chief Complaint   Patient presents with    Fall    Hip Pain          HPI:     Miguel Rico is a 77 y.o.  male who has history of glaucoma polio hyperlipidemia and seizures presented to ER  status post fall. Patient did not seize he was making breakfast had a mechanical fall while waking his oatmeal.      He was unable to get up from the ground with a shortened and externally rotated right leg. He notes pain with movement he had no loss of consciousness. No numbness or tingling.        X-ray rt hip  IMPRESSION: Right femoral neck fracture    CXR  IMPRESSION: Hyperinflation with no acute pulmonary disease. EKG  Normal sinus rhythm   Left axis deviation   Abnormal ECG   When compared with ECG of 23-MAR-2017 21:06,   No significant change was found     Past Medical History:   Diagnosis Date    Glaucoma     History of poliomyelitis     Hyperlipidemia     Seizures (HCC)        Past Surgical History:   Procedure Laterality Date    COLONOSCOPY N/A 1/30/2019    COLONOSCOPY w bx's performed by Juan Bhandari MD at [facility]    HX APPENDECTOMY         Family History   Family history unknown: Yes       Social History     Socioeconomic History    Marital status: SINGLE     Spouse name: Not on file    Number of children: Not on file    Years of education: Not on file    Highest education level: Not on file   Tobacco Use    Smoking status: Former Smoker    Smokeless tobacco: Former User     Quit date: 6/1/2016   Substance and Sexual Activity    Alcohol use: No    Drug use: No    Sexual activity: Yes     Partners: Female       Prior to Admission medications    Medication Sig Start Date End Date Taking?  Authorizing Provider   latanoprost (XALATAN) 0.005 % ophthalmic solution place 1 drop into both eyes at bedtime 18   Nguyễn Govea NP   carBAMazepine XR (TEGRETOL XR) 400 mg SR tablet take 1 tablet by mouth twice a day 18   Nguyễn Govea NP   acetaminophen (TYLENOL) 650 mg CR tablet Take 1 Tab by mouth as needed. 17   Ilene Soares MD   pravastatin (PRAVACHOL) 40 mg tablet Take 1 Tab by mouth nightly. 16   Tyler Patton MD       No Known Allergies    Review of Systems  GENERAL: Patient alert, awake and oriented times 3, able to communicate full sentences and not in distress. HEENT: No change in vision, no earache, tinnitus, sore throat or sinus congestion. NECK: No pain or stiffness. CARDIOVASCULAR: No chest pain or pressure. No palpitations. PULMONARY: No shortness of breath, cough or wheeze. GASTROINTESTINAL: No abdominal pain, nausea, vomiting or diarrhea, melena or       bright red blood per rectum. GENITOURINARY: No urinary frequency, urgency, hesitancy or dysuria. MUSCULOSKELETAL: Rt hip pain Rt leg shorter than Lt pt has congenital smaller Rt arm and Rt leg    DERMATOLOGIC: No rash, no itching, no lesions. ENDOCRINE: No polyuria, polydipsia, no heat or cold intolerance. No recent change in    weight. HEMATOLOGICAL: No anemia or easy bruising or bleeding. NEUROLOGIC: No headache,  H/O seizures no , numbness, tingling or weakness. PSYCHIATRIC: No depression, anxiety, mood disorder, no loss of interest in normal       activity or change in sleep pattern. Physical Exam:     Physical Exam:  Visit Vitals  /59   Pulse 79   Temp 98.8 °F (37.1 °C)   Resp 18   SpO2 97%           Temp (24hrs), Av.5 °F (36.9 °C), Min:98.1 °F (36.7 °C), Max:98.8 °F (37.1 °C)    No intake/output data recorded. No intake/output data recorded. General:  Alert, cooperative, no distress, appears stated age. Head:  Normocephalic, without obvious abnormality, atraumatic. Eyes:  Conjunctivae/corneas clear. PERRL, EOMs intact.    Nose: Nares normal. No drainage or sinus tenderness. Throat: Lips, mucosa, and tongue  Dry. Poor dentition   Neck: Supple, symmetrical, trachea midline, no adenopathy, thyroid: no enlargement/tenderness/nodules, no carotid bruit and no JVD. Back:   ROM normal. No CVA tenderness. Lungs:   Clear to auscultation bilaterally. Chest wall:  No tenderness or deformity. Heart:  Regular rate and rhythm, S1, S2 normal, no murmur, click, rub or gallop. Abdomen: Soft, non-tender. Bowel sounds normal. No masses,  No organomegaly. Extremities: Extremities  tenderness Rt hip short Rt leg and internally rotated muscle bulk decrease Rt arm and Rt leg comparing to Lt   Pulses: 2+ and symmetric all extremities. Skin: Skin color, texture, turgor normal. No rashes or lesions   Neurologic: CNII-XII intact. No focal motor or sensory deficit. Labs Reviewed: All lab results for the last 24 hours reviewed. CXR and EKG    Procedures/imaging: see electronic medical records for all procedures/Xrays and details which were not copied into this note but were reviewed prior to creation of Plan        Assessment/Plan     Active Problems:    Epilepsy ([address] (7/12/2017)      Congenital hemiparesis ([address] (7/12/2017)      Femur fracture, right ([address] (7/20/2019)       Rt femoral neck fracture   Discussed with ER Dr. Jennifer Lovelace pt will go to surgery tomorrow  Start cardiac diet  IV hydration   NPO after midnight  Cbc,cmp, mag tsh , lipid in AM    Seizure D/O  Resume tegretol 400 mg BID  Check tegretol level    Hyperlipidemia  Continue Pravachol  F/u  Liver function    Check cardiac enzymes x 1   Urine analysis   Repeat lab in AM  DVT/GI Prophylaxis: Hep SQ and H2B/PPI  Time for admission more than 45 minutes included discussion with ER , patient review record and result .   and documentation    Marj Baker MD  7/20/2019  3:26 PM

## 2019-07-21 ENCOUNTER — ANESTHESIA EVENT (OUTPATIENT)
Dept: SURGERY | Age: 69
DRG: 481 | End: 2019-07-21
Payer: MEDICARE

## 2019-07-21 ENCOUNTER — ANESTHESIA (OUTPATIENT)
Dept: SURGERY | Age: 69
DRG: 481 | End: 2019-07-21
Payer: MEDICARE

## 2019-07-21 ENCOUNTER — APPOINTMENT (OUTPATIENT)
Dept: GENERAL RADIOLOGY | Age: 69
DRG: 481 | End: 2019-07-21
Attending: ORTHOPAEDIC SURGERY
Payer: MEDICARE

## 2019-07-21 PROBLEM — S72.141A CLOSED DISPLACED INTERTROCHANTERIC FRACTURE OF RIGHT FEMUR (HCC): Status: ACTIVE | Noted: 2019-07-21

## 2019-07-21 LAB
ALBUMIN SERPL-MCNC: 3.6 G/DL (ref 3.4–5)
ALBUMIN/GLOB SERPL: 1.3 {RATIO} (ref 0.8–1.7)
ALP SERPL-CCNC: 82 U/L (ref 45–117)
ALT SERPL-CCNC: 21 U/L (ref 16–61)
ANION GAP SERPL CALC-SCNC: 4 MMOL/L (ref 3–18)
AST SERPL-CCNC: 19 U/L (ref 10–38)
BASOPHILS # BLD: 0 K/UL (ref 0–0.1)
BASOPHILS NFR BLD: 0 % (ref 0–2)
BILIRUB SERPL-MCNC: 0.5 MG/DL (ref 0.2–1)
BUN SERPL-MCNC: 15 MG/DL (ref 7–18)
BUN/CREAT SERPL: 16 (ref 12–20)
CALCIUM SERPL-MCNC: 8.8 MG/DL (ref 8.5–10.1)
CHLORIDE SERPL-SCNC: 106 MMOL/L (ref 100–111)
CHOLEST SERPL-MCNC: 198 MG/DL
CO2 SERPL-SCNC: 30 MMOL/L (ref 21–32)
CREAT SERPL-MCNC: 0.92 MG/DL (ref 0.6–1.3)
DIFFERENTIAL METHOD BLD: ABNORMAL
EOSINOPHIL # BLD: 0 K/UL (ref 0–0.4)
EOSINOPHIL NFR BLD: 0 % (ref 0–5)
ERYTHROCYTE [DISTWIDTH] IN BLOOD BY AUTOMATED COUNT: 12.8 % (ref 11.6–14.5)
GLOBULIN SER CALC-MCNC: 2.8 G/DL (ref 2–4)
GLUCOSE SERPL-MCNC: 107 MG/DL (ref 74–99)
HCT VFR BLD AUTO: 35.2 % (ref 36–48)
HDLC SERPL-MCNC: 108 MG/DL (ref 40–60)
HDLC SERPL: 1.8 {RATIO} (ref 0–5)
HGB BLD-MCNC: 11.9 G/DL (ref 13–16)
LDLC SERPL CALC-MCNC: 76 MG/DL (ref 0–100)
LIPID PROFILE,FLP: ABNORMAL
LYMPHOCYTES # BLD: 2.5 K/UL (ref 0.9–3.6)
LYMPHOCYTES NFR BLD: 24 % (ref 21–52)
MAGNESIUM SERPL-MCNC: 1.9 MG/DL (ref 1.6–2.6)
MCH RBC QN AUTO: 30.2 PG (ref 24–34)
MCHC RBC AUTO-ENTMCNC: 33.8 G/DL (ref 31–37)
MCV RBC AUTO: 89.3 FL (ref 74–97)
MONOCYTES # BLD: 0.6 K/UL (ref 0.05–1.2)
MONOCYTES NFR BLD: 6 % (ref 3–10)
NEUTS SEG # BLD: 7.1 K/UL (ref 1.8–8)
NEUTS SEG NFR BLD: 70 % (ref 40–73)
PLATELET # BLD AUTO: 172 K/UL (ref 135–420)
PMV BLD AUTO: 10.3 FL (ref 9.2–11.8)
POTASSIUM SERPL-SCNC: 4.5 MMOL/L (ref 3.5–5.5)
PROT SERPL-MCNC: 6.4 G/DL (ref 6.4–8.2)
RBC # BLD AUTO: 3.94 M/UL (ref 4.7–5.5)
SODIUM SERPL-SCNC: 140 MMOL/L (ref 136–145)
TRIGL SERPL-MCNC: 70 MG/DL (ref ?–150)
TSH SERPL DL<=0.05 MIU/L-ACNC: 0.67 UIU/ML (ref 0.36–3.74)
VLDLC SERPL CALC-MCNC: 14 MG/DL
WBC # BLD AUTO: 10.2 K/UL (ref 4.6–13.2)

## 2019-07-21 PROCEDURE — 0QS606Z REPOSITION RIGHT UPPER FEMUR WITH INTRAMEDULLARY INTERNAL FIXATION DEVICE, OPEN APPROACH: ICD-10-PCS | Performed by: ORTHOPAEDIC SURGERY

## 2019-07-21 PROCEDURE — 74011250637 HC RX REV CODE- 250/637: Performed by: ORTHOPAEDIC SURGERY

## 2019-07-21 PROCEDURE — 65270000029 HC RM PRIVATE

## 2019-07-21 PROCEDURE — C1713 ANCHOR/SCREW BN/BN,TIS/BN: HCPCS | Performed by: ORTHOPAEDIC SURGERY

## 2019-07-21 PROCEDURE — 77030027138 HC INCENT SPIROMETER -A

## 2019-07-21 PROCEDURE — 76060000033 HC ANESTHESIA 1 TO 1.5 HR: Performed by: ORTHOPAEDIC SURGERY

## 2019-07-21 PROCEDURE — 80061 LIPID PANEL: CPT

## 2019-07-21 PROCEDURE — 85025 COMPLETE CBC W/AUTO DIFF WBC: CPT

## 2019-07-21 PROCEDURE — 80053 COMPREHEN METABOLIC PANEL: CPT

## 2019-07-21 PROCEDURE — 77030031139 HC SUT VCRL2 J&J -A: Performed by: ORTHOPAEDIC SURGERY

## 2019-07-21 PROCEDURE — 76010000149 HC OR TIME 1 TO 1.5 HR: Performed by: ORTHOPAEDIC SURGERY

## 2019-07-21 PROCEDURE — 74011000250 HC RX REV CODE- 250

## 2019-07-21 PROCEDURE — 36415 COLL VENOUS BLD VENIPUNCTURE: CPT

## 2019-07-21 PROCEDURE — 84443 ASSAY THYROID STIM HORMONE: CPT

## 2019-07-21 PROCEDURE — 77010033678 HC OXYGEN DAILY

## 2019-07-21 PROCEDURE — 77030016474 HC BIT DRL QC3 SYNT -C: Performed by: ORTHOPAEDIC SURGERY

## 2019-07-21 PROCEDURE — C1769 GUIDE WIRE: HCPCS | Performed by: ORTHOPAEDIC SURGERY

## 2019-07-21 PROCEDURE — 73502 X-RAY EXAM HIP UNI 2-3 VIEWS: CPT

## 2019-07-21 PROCEDURE — 83735 ASSAY OF MAGNESIUM: CPT

## 2019-07-21 PROCEDURE — 77030018836 HC SOL IRR NACL ICUM -A: Performed by: ORTHOPAEDIC SURGERY

## 2019-07-21 PROCEDURE — 74011250637 HC RX REV CODE- 250/637: Performed by: FAMILY MEDICINE

## 2019-07-21 PROCEDURE — 77030013079 HC BLNKT BAIR HGGR 3M -A: Performed by: NURSE ANESTHETIST, CERTIFIED REGISTERED

## 2019-07-21 PROCEDURE — 77030008683 HC TU ET CUF COVD -A: Performed by: NURSE ANESTHETIST, CERTIFIED REGISTERED

## 2019-07-21 PROCEDURE — 74011250636 HC RX REV CODE- 250/636

## 2019-07-21 PROCEDURE — 77030026438 HC STYL ET INTUB CARD -A: Performed by: NURSE ANESTHETIST, CERTIFIED REGISTERED

## 2019-07-21 PROCEDURE — 76210000006 HC OR PH I REC 0.5 TO 1 HR: Performed by: ORTHOPAEDIC SURGERY

## 2019-07-21 PROCEDURE — 74011250636 HC RX REV CODE- 250/636: Performed by: ORTHOPAEDIC SURGERY

## 2019-07-21 PROCEDURE — 77030008462 HC STPLR SKN PROX J&J -A: Performed by: ORTHOPAEDIC SURGERY

## 2019-07-21 DEVICE — IMPLANTABLE DEVICE: Type: IMPLANTABLE DEVICE | Site: HIP | Status: FUNCTIONAL

## 2019-07-21 DEVICE — SCREW BNE L36MM DIA5MM NONSTERILE TIB LT GRN TI ST CANN LOK: Type: IMPLANTABLE DEVICE | Site: HIP | Status: FUNCTIONAL

## 2019-07-21 DEVICE — SCREW BNE L90MM DIA10.35MM PROX FEM G TI CANN FOR TFN ADV: Type: IMPLANTABLE DEVICE | Site: HIP | Status: FUNCTIONAL

## 2019-07-21 RX ORDER — PROPOFOL 10 MG/ML
INJECTION, EMULSION INTRAVENOUS AS NEEDED
Status: DISCONTINUED | OUTPATIENT
Start: 2019-07-21 | End: 2019-07-21 | Stop reason: HOSPADM

## 2019-07-21 RX ORDER — OXYCODONE HYDROCHLORIDE 5 MG/1
5 TABLET ORAL
Status: DISCONTINUED | OUTPATIENT
Start: 2019-07-21 | End: 2019-07-26 | Stop reason: HOSPADM

## 2019-07-21 RX ORDER — MORPHINE SULFATE 10 MG/ML
2 INJECTION, SOLUTION INTRAMUSCULAR; INTRAVENOUS
Status: DISCONTINUED | OUTPATIENT
Start: 2019-07-21 | End: 2019-07-21 | Stop reason: HOSPADM

## 2019-07-21 RX ORDER — LIDOCAINE HYDROCHLORIDE 20 MG/ML
INJECTION, SOLUTION EPIDURAL; INFILTRATION; INTRACAUDAL; PERINEURAL AS NEEDED
Status: DISCONTINUED | OUTPATIENT
Start: 2019-07-21 | End: 2019-07-21 | Stop reason: HOSPADM

## 2019-07-21 RX ORDER — SODIUM CHLORIDE 0.9 % (FLUSH) 0.9 %
5-40 SYRINGE (ML) INJECTION AS NEEDED
Status: DISCONTINUED | OUTPATIENT
Start: 2019-07-21 | End: 2019-07-21 | Stop reason: HOSPADM

## 2019-07-21 RX ORDER — ENOXAPARIN SODIUM 100 MG/ML
40 INJECTION SUBCUTANEOUS DAILY
Qty: 28 SYRINGE | Refills: 0 | Status: SHIPPED | OUTPATIENT
Start: 2019-07-21 | End: 2019-08-18

## 2019-07-21 RX ORDER — SODIUM CHLORIDE, SODIUM LACTATE, POTASSIUM CHLORIDE, CALCIUM CHLORIDE 600; 310; 30; 20 MG/100ML; MG/100ML; MG/100ML; MG/100ML
75 INJECTION, SOLUTION INTRAVENOUS CONTINUOUS
Status: DISCONTINUED | OUTPATIENT
Start: 2019-07-21 | End: 2019-07-21 | Stop reason: HOSPADM

## 2019-07-21 RX ORDER — OXYCODONE AND ACETAMINOPHEN 5; 325 MG/1; MG/1
1 TABLET ORAL
Qty: 60 TAB | Refills: 0 | Status: SHIPPED | OUTPATIENT
Start: 2019-07-21 | End: 2019-07-24

## 2019-07-21 RX ORDER — GLYCOPYRROLATE 0.2 MG/ML
INJECTION INTRAMUSCULAR; INTRAVENOUS AS NEEDED
Status: DISCONTINUED | OUTPATIENT
Start: 2019-07-21 | End: 2019-07-21 | Stop reason: HOSPADM

## 2019-07-21 RX ORDER — MIDAZOLAM HYDROCHLORIDE 1 MG/ML
INJECTION, SOLUTION INTRAMUSCULAR; INTRAVENOUS AS NEEDED
Status: DISCONTINUED | OUTPATIENT
Start: 2019-07-21 | End: 2019-07-21 | Stop reason: HOSPADM

## 2019-07-21 RX ORDER — NEOSTIGMINE METHYLSULFATE 5 MG/5 ML
SYRINGE (ML) INTRAVENOUS AS NEEDED
Status: DISCONTINUED | OUTPATIENT
Start: 2019-07-21 | End: 2019-07-21 | Stop reason: HOSPADM

## 2019-07-21 RX ORDER — SODIUM CHLORIDE 0.9 % (FLUSH) 0.9 %
5-40 SYRINGE (ML) INJECTION EVERY 8 HOURS
Status: DISCONTINUED | OUTPATIENT
Start: 2019-07-21 | End: 2019-07-21 | Stop reason: HOSPADM

## 2019-07-21 RX ORDER — CEFAZOLIN SODIUM 2 G/50ML
2 SOLUTION INTRAVENOUS EVERY 8 HOURS
Status: COMPLETED | OUTPATIENT
Start: 2019-07-21 | End: 2019-07-22

## 2019-07-21 RX ORDER — ENOXAPARIN SODIUM 100 MG/ML
40 INJECTION SUBCUTANEOUS DAILY
Status: DISCONTINUED | OUTPATIENT
Start: 2019-07-22 | End: 2019-07-26 | Stop reason: HOSPADM

## 2019-07-21 RX ORDER — ONDANSETRON 2 MG/ML
INJECTION INTRAMUSCULAR; INTRAVENOUS AS NEEDED
Status: DISCONTINUED | OUTPATIENT
Start: 2019-07-21 | End: 2019-07-21 | Stop reason: HOSPADM

## 2019-07-21 RX ORDER — FENTANYL CITRATE 50 UG/ML
INJECTION, SOLUTION INTRAMUSCULAR; INTRAVENOUS AS NEEDED
Status: DISCONTINUED | OUTPATIENT
Start: 2019-07-21 | End: 2019-07-21 | Stop reason: HOSPADM

## 2019-07-21 RX ORDER — SODIUM CHLORIDE 9 MG/ML
INJECTION, SOLUTION INTRAVENOUS
Status: DISCONTINUED | OUTPATIENT
Start: 2019-07-21 | End: 2019-07-21 | Stop reason: HOSPADM

## 2019-07-21 RX ORDER — PHENYLEPHRINE HCL IN 0.9% NACL 1 MG/10 ML
SYRINGE (ML) INTRAVENOUS AS NEEDED
Status: DISCONTINUED | OUTPATIENT
Start: 2019-07-21 | End: 2019-07-21 | Stop reason: HOSPADM

## 2019-07-21 RX ORDER — NALOXONE HYDROCHLORIDE 0.4 MG/ML
0.2 INJECTION, SOLUTION INTRAMUSCULAR; INTRAVENOUS; SUBCUTANEOUS AS NEEDED
Status: DISCONTINUED | OUTPATIENT
Start: 2019-07-21 | End: 2019-07-21 | Stop reason: HOSPADM

## 2019-07-21 RX ORDER — CEFAZOLIN SODIUM 1 G/3ML
INJECTION, POWDER, FOR SOLUTION INTRAMUSCULAR; INTRAVENOUS AS NEEDED
Status: DISCONTINUED | OUTPATIENT
Start: 2019-07-21 | End: 2019-07-21 | Stop reason: HOSPADM

## 2019-07-21 RX ORDER — ONDANSETRON 2 MG/ML
4 INJECTION INTRAMUSCULAR; INTRAVENOUS ONCE
Status: DISCONTINUED | OUTPATIENT
Start: 2019-07-21 | End: 2019-07-21 | Stop reason: HOSPADM

## 2019-07-21 RX ORDER — DEXTROSE 50 % IN WATER (D50W) INTRAVENOUS SYRINGE
25-50 AS NEEDED
Status: DISCONTINUED | OUTPATIENT
Start: 2019-07-21 | End: 2019-07-21 | Stop reason: HOSPADM

## 2019-07-21 RX ORDER — MAGNESIUM SULFATE 100 %
4 CRYSTALS MISCELLANEOUS AS NEEDED
Status: DISCONTINUED | OUTPATIENT
Start: 2019-07-21 | End: 2019-07-21 | Stop reason: HOSPADM

## 2019-07-21 RX ORDER — ROCURONIUM BROMIDE 10 MG/ML
INJECTION, SOLUTION INTRAVENOUS AS NEEDED
Status: DISCONTINUED | OUTPATIENT
Start: 2019-07-21 | End: 2019-07-21 | Stop reason: HOSPADM

## 2019-07-21 RX ADMIN — MIDAZOLAM HYDROCHLORIDE 2 MG: 1 INJECTION, SOLUTION INTRAMUSCULAR; INTRAVENOUS at 08:32

## 2019-07-21 RX ADMIN — GLYCOPYRROLATE 0.2 MG: 0.2 INJECTION INTRAMUSCULAR; INTRAVENOUS at 08:32

## 2019-07-21 RX ADMIN — Medication 3 MG: at 09:40

## 2019-07-21 RX ADMIN — CARBAMAZEPINE 400 MG: 200 TABLET, EXTENDED RELEASE ORAL at 20:09

## 2019-07-21 RX ADMIN — CEFAZOLIN 2 G: 10 INJECTION, POWDER, FOR SOLUTION INTRAVENOUS at 18:56

## 2019-07-21 RX ADMIN — SODIUM CHLORIDE: 9 INJECTION, SOLUTION INTRAVENOUS at 08:32

## 2019-07-21 RX ADMIN — FAMOTIDINE 20 MG: 20 TABLET ORAL at 18:56

## 2019-07-21 RX ADMIN — GLYCOPYRROLATE 0.4 MG: 0.2 INJECTION INTRAMUSCULAR; INTRAVENOUS at 09:40

## 2019-07-21 RX ADMIN — Medication 100 MCG: at 09:01

## 2019-07-21 RX ADMIN — FENTANYL CITRATE 50 MCG: 50 INJECTION, SOLUTION INTRAMUSCULAR; INTRAVENOUS at 08:37

## 2019-07-21 RX ADMIN — FENTANYL CITRATE 50 MCG: 50 INJECTION, SOLUTION INTRAMUSCULAR; INTRAVENOUS at 09:11

## 2019-07-21 RX ADMIN — PRAVASTATIN SODIUM 40 MG: 20 TABLET ORAL at 21:47

## 2019-07-21 RX ADMIN — ROCURONIUM BROMIDE 30 MG: 10 INJECTION, SOLUTION INTRAVENOUS at 08:36

## 2019-07-21 RX ADMIN — DOCUSATE SODIUM 100 MG: 100 CAPSULE, LIQUID FILLED ORAL at 18:56

## 2019-07-21 RX ADMIN — OXYCODONE HYDROCHLORIDE 5 MG: 5 TABLET ORAL at 20:10

## 2019-07-21 RX ADMIN — LIDOCAINE HYDROCHLORIDE 60 MG: 20 INJECTION, SOLUTION EPIDURAL; INFILTRATION; INTRACAUDAL; PERINEURAL at 08:36

## 2019-07-21 RX ADMIN — PROPOFOL 120 MG: 10 INJECTION, EMULSION INTRAVENOUS at 08:36

## 2019-07-21 RX ADMIN — ONDANSETRON 4 MG: 2 INJECTION INTRAMUSCULAR; INTRAVENOUS at 08:32

## 2019-07-21 RX ADMIN — LATANOPROST 1 DROP: 50 SOLUTION OPHTHALMIC at 21:51

## 2019-07-21 RX ADMIN — OXYCODONE HYDROCHLORIDE 5 MG: 5 TABLET ORAL at 14:24

## 2019-07-21 RX ADMIN — CEFAZOLIN SODIUM 2 G: 1 INJECTION, POWDER, FOR SOLUTION INTRAMUSCULAR; INTRAVENOUS at 09:08

## 2019-07-21 RX ADMIN — Medication 100 MCG: at 08:41

## 2019-07-21 NOTE — ROUTINE PROCESS
Bedside and Verbal shift change report given to Julius Epstein RN (oncoming nurse) by US Domínguez RN (offgoing nurse). Report included the following information SBAR, Kardex, Intake/Output and MAR.

## 2019-07-21 NOTE — PROGRESS NOTES
Progress Note    Patient: Delmy Moss MRN: [id number]  CSN: [id number]    YOB: 1950  Age: 77 y.o. Sex: male    DOA: 7/20/2019 LOS:  LOS: 1 day                    Subjective:     Pt s/p nailing Rt femur pt c/o pain Rt hip. Started on diet per ortho . Pt denied chest pain no SOB no nausea , no abdominal pain    Discussed with his nurse       Chief Complaint:   Chief Complaint   Patient presents with    Fall    Hip Pain       Review of systems  General: No fevers or chills. Cardiovascular: No chest pain or pressure. No palpitations. Pulmonary: No shortness of breath, cough or wheeze. Gastrointestinal: No abdominal pain, nausea, vomiting or diarrhea. Genitourinary: No  dysuria. Musculoskeletal: RT hip pain    Neurologic: No headache,  H/O seizure     Objective:     Physical Exam:  Visit Vitals  /68   Pulse 71   Temp 97.7 °F (36.5 °C)   Resp 15   Ht 5' 9\" (1.753 m)   SpO2 100%   BMI 19.35 kg/m²        General:         Alert,  no acute distress    HEENT: NC, Atraumatic. anicteric sclerae. Lungs: CTA Bilaterally. No Wheezing/Rhonchi/Rales. Heart:  Regular  rhythm,  No murmur, No Rubs, No Gallops  Abdomen: Soft, Non distended, Non tender. Extremities:  no lower limb edema , positive peripheral pulse   Psych:    Not anxious or agitated. Neurologic:  CN 2-12 grossly intact, Alert and oriented X 3.   No acute neurological                                 Deficits,     Intake and Output:  Current Shift:  07/21 0701 - 07/21 1900  In: 600 [I.V.:600]  Out: -   Last three shifts:  07/19 1901 - 07/21 0700  In: 0   Out: 150 [Urine:150]    Labs: Results:       Chemistry Recent Labs     07/21/19 0522 07/20/19  1225   * 136*    144   K 4.5 3.7    110   CO2 30 28   BUN 15 17   CREA 0.92 0.93   CA 8.8 8.6   AGAP 4 6   BUCR 16 18   AP 82 86   TP 6.4 6.7   ALB 3.6 3.9   GLOB 2.8 2.8   AGRAT 1.3 1.4      CBC w/Diff Recent Labs     07/21/19 0522 07/20/19  1225   WBC 10.2 8.5   RBC 3.94* 4.25*   HGB 11.9* 13.1   HCT 35.2* 37.7    156   GRANS 70 46   LYMPH 24 50   EOS 0 1      Cardiac Enzymes Recent Labs     07/20/19  1916      CKND1 CALCULATION NOT PERFORMED WHEN RESULT IS BELOW LINEAR LIMIT      Coagulation Recent Labs     07/20/19  1225   PTP 14.2   INR 1.1       Lipid Panel Lab Results   Component Value Date/Time    Cholesterol, total 198 07/21/2019 05:22 AM    HDL Cholesterol 108 (H) 07/21/2019 05:22 AM    LDL, calculated 76 07/21/2019 05:22 AM    VLDL, calculated 14 07/21/2019 05:22 AM    Triglyceride 70 07/21/2019 05:22 AM    CHOL/HDL Ratio 1.8 07/21/2019 05:22 AM      BNP No results for input(s): BNPP in the last 72 hours.    Liver Enzymes Recent Labs     07/21/19  0522   TP 6.4   ALB 3.6   AP 82   SGOT 19      Thyroid Studies Lab Results   Component Value Date/Time    TSH 0.67 07/21/2019 05:22 AM          Procedures/imaging: see electronic medical records for all procedures/Xrays and details which were not copied into this note but were reviewed prior to creation of Plan    Medications:   Current Facility-Administered Medications   Medication Dose Route Frequency    [START ON 7/22/2019] enoxaparin (LOVENOX) injection 40 mg  40 mg SubCUTAneous DAILY    oxyCODONE IR (ROXICODONE) tablet 5 mg  5 mg Oral Q4H PRN    ceFAZolin (ANCEF) 2g IVPB in 50 mL D5W  2 g IntraVENous Q8H    HYDROmorphone (DILAUDID) injection 1 mg  1 mg IntraVENous Q4H PRN    acetaminophen (TYLENOL) tablet 650 mg  650 mg Oral Q4H PRN    carBAMazepine XR (TEGretol XR) tablet 400 mg  400 mg Oral Q12H    latanoprost (XALATAN) 0.005 % ophthalmic solution 1 Drop  1 Drop Both Eyes QHS    pravastatin (PRAVACHOL) tablet 40 mg  40 mg Oral QHS    docusate sodium (COLACE) capsule 100 mg  100 mg Oral BID    bisacodyl (DULCOLAX) suppository 10 mg  10 mg Rectal DAILY PRN    famotidine (PEPCID) tablet 20 mg  20 mg Oral BID       Assessment/Plan     Active Problems:    Epilepsy ([address] (7/12/2017)      [address]enital hemiparesis [address] [address] (7/12/2017)      Femur fracture, right ([facility] [address] (7/20/2019)      Closed displaced intertrochanteric fracture of right femur ([facility] [address] (7/21/2019)      Plan  Continue current treatment  Pt and ot  Evaluation and treatment  Monitor oral intake BM  Repeat lab in AM cbc, cmp, mag  Lovenox for DVT prophylaxis , Pepcid for GI prophylaxis      Sidney Jay MD  7/21/2019 1:24 PM

## 2019-07-21 NOTE — PERIOP NOTES
Addended by: ARNULFO ERICKSON on: 4/15/2019 09:01 AM     Modules accepted: Orders    
TRANSFER - OUT REPORT:    Verbal report given to [facility] RN on Melissa Wise  being transferred to [facility] for routine post - op       Report consisted of patients Situation, Background, Assessment and   Recommendations(SBAR). Information from the following report(s) SBAR, OR Summary, Procedure Summary, Intake/Output, MAR and Recent Results was reviewed with the receiving nurse. Lines:   Peripheral IV 07/20/19 Left Antecubital (Active)   Site Assessment Clean, dry, & intact 7/21/2019  9:52 AM   Phlebitis Assessment 0 7/21/2019  9:52 AM   Infiltration Assessment 0 7/21/2019  9:52 AM   Dressing Status Clean, dry, & intact 7/21/2019  9:52 AM   Dressing Type Transparent 7/21/2019  9:52 AM   Hub Color/Line Status Green; Infusing;Patent 7/21/2019  9:52 AM   Action Taken Blood drawn 7/20/2019  2:20 PM   Alcohol Cap Used No 7/20/2019  2:20 PM        Opportunity for questions and clarification was provided.       Patient transported with:   O2 @ 3 liters  Registered Nurse
yes

## 2019-07-21 NOTE — INTERVAL H&P NOTE
H&P Update:  Grady Barrios was seen and examined. History and physical has been reviewed. The patient has been examined.  There have been no significant clinical changes since the completion of the originally dated History and Physical.

## 2019-07-21 NOTE — ROUTINE PROCESS
Bedside and Verbal shift change report given to [address] (oncoming nurse) by Bandar Fisher RN (offgoing nurse). Report included the following information SBAR, Kardex, Procedure Summary, Intake/Output, MAR and Recent Results.

## 2019-07-21 NOTE — OP NOTES
[address] [place] [address]   OPERATIVE REPORT    Name:  Nate Kaiser  MR#:   [id number]  :  1950  ACCOUNT #:  [de-identified]  DATE OF SERVICE:  2019    PREOPERATIVE DIAGNOSIS:  Right hip intertrochanteric fracture, displaced. POSTOPERATIVE DIAGNOSIS:  Right hip intertrochanteric fracture, displaced. PROCEDURE PERFORMED:  Right hip open reduction and internal fixation with cephalomedullary nail, CPT code 69417. SURGEON:  Alfonso Osborne MD.    ASSISTANT:  Jeffery Rojas PA-C. Jeffery Rojas PA-C, was medically necessary for the procedure. She assisted in patient positioning, fracture reduction, placement of implants, wound closure, and transfer of the patient to the PACU. ANESTHESIA:  General.    IV FLUIDS:  500 mL of LR.    ESTIMATED BLOOD LOSS:  50 mL. COMPLICATIONS:  None. IMPLANTS:  Synthes short TFN with a 90-mm head screw and a 36-mm locking screw. SPECIMENS REMOVED:  None. INDICATION FOR PROCEDURE:  The patient is a 68-year-old male who presented to the emergency room yesterday after a fall at home. This was a mechanical fall. In the emergency room, he was found to have a right hip intertrochanteric fracture. Orthopedics was consulted. After discussing the treatment options at length, he elected to undergo the procedure described. He also was admitted to Medicine and underwent medical clearance prior to surgery. DESCRIPTION OF PROCEDURE:  The patient was identified in the preoperative holding area. The right hip was marked as the operative extremity after confirmation with the patient. After discussing the risks and benefits of the procedure, informed consent was obtained. The patient was then taken to the operating room. On the hospital bed, he was anesthetized and intubated. He was brought down to the perineal post.  The left leg was extended at the hip.   A timeout was performed verifying the correct patient, procedure, and operative extremity with all in agreement. Antibiotics were given through the IV prior to the start of the procedure. The procedure commenced with a closed reduction. Using traction and internal rotation, the fracture was able to be reduced, and AP and lateral fluoroscopy confirmed with the C-arm. The right hip and thigh were then prepped and draped in a normal sterile fashion. Through a stab incision proximal to the greater trochanter, a guidewire was placed through the greater trochanter into the femoral canal, confirmed on AP and lateral fluoroscopy. This incision was then extended distally. The opening reamer was used to open the femoral canal.  A short nail was then impacted into place. Using the targeting guide, a guidewire was placed through a lateral stab incision through the femoral neck and into the femoral head. This was measured. This was reamed over. The screw was then screwed into place. The locking bolt was then tightened down and then released one turn to allow for compression. The fracture was then compressed and locked into place. The targeting guide was then used to place the distal interlocking screw that was placed through the stab incision in the lateral proximal femur. This was drilled and then placed by hand a size 36-mm screw. The targeting guide was then removed. Final x-rays were obtained. The wounds were closed in layered fashion. A sterile dressing was placed about the right hip. The patient was then awakened from anesthesia and taken to PACU in stable condition with the plan for readmission. POSTOPERATIVE PLAN:  The patient will be readmitted to the medical service. He is to be ordered weightbearing as tolerated with physical therapy on the right lower extremity. He will receive 24 hours of perioperative antibiotics in the form of Ancef. We will also start anticoagulation, currently ordered Lovenox once daily on postoperative day 1.   Pending his progress with physical therapy, he will be discharged with further recommendations.       MD JAKOB Gardner/[id number]  D:  07/21/2019 10:31  T:  07/21/2019 11:35  JOB #:  [id number]

## 2019-07-21 NOTE — PROGRESS NOTES
Problem: Falls - Risk of  Goal: *Absence of Falls  Description  Document Eamon Barnes Fall Risk and appropriate interventions in the flowsheet. Outcome: Progressing Towards Goal  Note:   Fall Risk Interventions:            Medication Interventions: Patient to call before getting OOB    Elimination Interventions: Call light in reach, Urinal in reach    History of Falls Interventions: Investigate reason for fall, Door open when patient unattended         Problem: Patient Education: Go to Patient Education Activity  Goal: Patient/Family Education  Outcome: Progressing Towards Goal     Problem: Pain  Goal: *Control of Pain  Outcome: Progressing Towards Goal     Problem: Patient Education: Go to Patient Education Activity  Goal: Patient/Family Education  Outcome: Progressing Towards Goal     Problem: Pressure Injury - Risk of  Goal: *Prevention of pressure injury  Description  Document Venancio Scale and appropriate interventions in the flowsheet.   Outcome: Progressing Towards Goal  Note:   Pressure Injury Interventions:       Moisture Interventions: Absorbent underpads    Activity Interventions: Increase time out of bed, PT/OT evaluation    Mobility Interventions: Assess need for specialty bed    Nutrition Interventions: Discuss nutritional consult with provider                     Problem: Patient Education: Go to Patient Education Activity  Goal: Patient/Family Education  Outcome: Progressing Towards Goal     Problem: Infection - Risk of, Surgical Site Infection  Goal: *Absence of surgical site infection signs and symptoms  Outcome: Progressing Towards Goal     Problem: Patient Education: Go to Patient Education Activity  Goal: Patient/Family Education  Outcome: Progressing Towards Goal

## 2019-07-21 NOTE — ANESTHESIA POSTPROCEDURE EVALUATION
Procedure(s): FEMUR INSERTION INTRA MEDULLARY NAIL RIGHT. general    Anesthesia Post Evaluation      Multimodal analgesia: multimodal analgesia used between 6 hours prior to anesthesia start to PACU discharge  Patient location during evaluation: PACU  Patient participation: complete - patient participated  Level of consciousness: awake  Pain management: adequate  Airway patency: patent  Anesthetic complications: no  Cardiovascular status: acceptable  Respiratory status: acceptable  Hydration status: acceptable  Post anesthesia nausea and vomiting:  controlled      Vitals Value Taken Time   /79 7/21/2019  9:53 AM   Temp 36.5 °C (97.7 °F) 7/21/2019  9:53 AM   Pulse 73 7/21/2019 10:01 AM   Resp 11 7/21/2019 10:01 AM   SpO2 100 % 7/21/2019 10:01 AM   Vitals shown include unvalidated device data.

## 2019-07-21 NOTE — PROGRESS NOTES
[facility]  Inpatient Progress Note      2019  8:33 AM     Chart reviewed. Interval History/Events of Past 24 hours:   No events overnight, cleared for surgery    Subjective:  No new complaints, right hip pain 7/10    Objective:  Vital signs:   Visit Vitals  /77 (BP 1 Location: Left arm, BP Patient Position: At rest)   Pulse 80   Temp 99.1 °F (37.3 °C)   Resp 14   SpO2 98%     Patient Vitals for the past 24 hrs:   Temp Pulse Resp BP SpO2   19 0545 99.1 °F (37.3 °C) 80 14 128/77 98 %   19 2230 98.7 °F (37.1 °C) 90 16 143/78 95 %   19 1830 98 °F (36.7 °C) 85 18 131/80 98 %   19 1617 98.6 °F (37 °C) 84 19 127/77 96 %   19 1500 98.8 °F (37.1 °C) 79 18 128/59 97 %   19 1445  78 12 137/75 100 %   19 1430  80 16 136/79 100 %   19 1415  73 16 128/59 99 %   19 1400  78 12 126/67 100 %   19 1345  80 15 146/63 100 %   19 1330  76 20 114/71 100 %   19 1315  75 12 137/70 99 %   19 1300  75 12 127/64 99 %   19 1245  76 17 114/54 99 %   19 1230 98.1 °F (36.7 °C) 72 16 136/65 99 %     Temp (24hrs), Av.6 °F (37 °C), Min:98 °F (36.7 °C), Max:99.1 °F (37.3 °C)    Admission Weight: Last Weight           Physical Examination:     General:  Alert, oriented, NAD  MS Exam: Right leg short/ER  SILT/NVI distally            Labs:  Recent Results (from the past 24 hour(s))   CBC WITH AUTOMATED DIFF    Collection Time: 19 12:25 PM   Result Value Ref Range    WBC 8.5 4.6 - 13.2 K/uL    RBC 4.25 (L) 4.70 - 5.50 M/uL    HGB 13.1 13.0 - 16.0 g/dL    HCT 37.7 36.0 - 48.0 %    MCV 88.7 74.0 - 97.0 FL    MCH 30.8 24.0 - 34.0 PG    MCHC 34.7 31.0 - 37.0 g/dL    RDW 12.8 11.6 - 14.5 %    PLATELET 654 058 - 893 K/uL    MPV 9.9 9.2 - 11.8 FL    NEUTROPHILS 46 40 - 73 %    LYMPHOCYTES 50 21 - 52 %    MONOCYTES 3 3 - 10 %    EOSINOPHILS 1 0 - 5 %    BASOPHILS 0 0 - 2 %    ABS.  NEUTROPHILS 3.9 1.8 - 8.0 K/UL    ABS. LYMPHOCYTES 4.2 (H) 0.9 - 3.6 K/UL    ABS. MONOCYTES 0.3 0.05 - 1.2 K/UL    ABS. EOSINOPHILS 0.1 0.0 - 0.4 K/UL    ABS. BASOPHILS 0.0 0.0 - 0.1 K/UL    DF AUTOMATED     METABOLIC PANEL, COMPREHENSIVE    Collection Time: 07/20/19 12:25 PM   Result Value Ref Range    Sodium 144 136 - 145 mmol/L    Potassium 3.7 3.5 - 5.5 mmol/L    Chloride 110 100 - 111 mmol/L    CO2 28 21 - 32 mmol/L    Anion gap 6 3.0 - 18 mmol/L    Glucose 136 (H) 74 - 99 mg/dL    BUN 17 7.0 - 18 MG/DL    Creatinine 0.93 0.6 - 1.3 MG/DL    BUN/Creatinine ratio 18 12 - 20      GFR est AA >60 >60 ml/min/1.73m2    GFR est non-AA >60 >60 ml/min/1.73m2    Calcium 8.6 8.5 - 10.1 MG/DL    Bilirubin, total 0.3 0.2 - 1.0 MG/DL    ALT (SGPT) 24 16 - 61 U/L    AST (SGOT) 16 10 - 38 U/L    Alk.  phosphatase 86 45 - 117 U/L    Protein, total 6.7 6.4 - 8.2 g/dL    Albumin 3.9 3.4 - 5.0 g/dL    Globulin 2.8 2.0 - 4.0 g/dL    A-G Ratio 1.4 0.8 - 1.7     PROTHROMBIN TIME + INR    Collection Time: 07/20/19 12:25 PM   Result Value Ref Range    Prothrombin time 14.2 11.5 - 15.2 sec    INR 1.1 0.8 - 1.2     EKG, 12 LEAD, INITIAL    Collection Time: 07/20/19  2:14 PM   Result Value Ref Range    Ventricular Rate 73 BPM    Atrial Rate 73 BPM    P-R Interval 160 ms    QRS Duration 84 ms    Q-T Interval 378 ms    QTC Calculation (Bezet) 416 ms    Calculated P Axis 72 degrees    Calculated R Axis -39 degrees    Calculated T Axis 45 degrees    Diagnosis       Normal sinus rhythm  Left axis deviation  Abnormal ECG  When compared with ECG of 23-MAR-2017 21:06,  No significant change was found  Confirmed by Kimberly Gamino MD, ----- ([id number]) on 7/20/2019 2:29:08 PM     CARDIAC PANEL,(CK, CKMB & TROPONIN)    Collection Time: 07/20/19  7:16 PM   Result Value Ref Range     39 - 308 U/L    CK - MB <1.0 <3.6 ng/ml    CK-MB Index  0.0 - 4.0 %     CALCULATION NOT PERFORMED WHEN RESULT IS BELOW LINEAR LIMIT    Troponin-I, QT <0.02 0.0 - 0.045 NG/ML   CARBAMAZEPINE Collection Time: 07/20/19  7:16 PM   Result Value Ref Range    Carbamazepine 11.6 4.0 - 12.0 ug/mL   URINALYSIS W/ RFLX MICROSCOPIC    Collection Time: 07/20/19  7:50 PM   Result Value Ref Range    Color YELLOW      Appearance CLEAR      Specific gravity 1.029 1.005 - 1.030      pH (UA) 5.5 5.0 - 8.0      Protein NEGATIVE  NEG mg/dL    Glucose NEGATIVE  NEG mg/dL    Ketone 15 (A) NEG mg/dL    Bilirubin NEGATIVE  NEG      Blood NEGATIVE  NEG      Urobilinogen 0.2 0.2 - 1.0 EU/dL    Nitrites NEGATIVE  NEG      Leukocyte Esterase NEGATIVE  NEG     TSH 3RD GENERATION    Collection Time: 07/21/19  5:22 AM   Result Value Ref Range    TSH 0.67 0.36 - 3.74 uIU/mL   LIPID PANEL    Collection Time: 07/21/19  5:22 AM   Result Value Ref Range    LIPID PROFILE          Cholesterol, total 198 <200 MG/DL    Triglyceride 70 <150 MG/DL    HDL Cholesterol 108 (H) 40 - 60 MG/DL    LDL, calculated 76 0 - 100 MG/DL    VLDL, calculated 14 MG/DL    CHOL/HDL Ratio 1.8 0 - 5.0     CBC WITH AUTOMATED DIFF    Collection Time: 07/21/19  5:22 AM   Result Value Ref Range    WBC 10.2 4.6 - 13.2 K/uL    RBC 3.94 (L) 4.70 - 5.50 M/uL    HGB 11.9 (L) 13.0 - 16.0 g/dL    HCT 35.2 (L) 36.0 - 48.0 %    MCV 89.3 74.0 - 97.0 FL    MCH 30.2 24.0 - 34.0 PG    MCHC 33.8 31.0 - 37.0 g/dL    RDW 12.8 11.6 - 14.5 %    PLATELET 022 875 - 334 K/uL    MPV 10.3 9.2 - 11.8 FL    NEUTROPHILS 70 40 - 73 %    LYMPHOCYTES 24 21 - 52 %    MONOCYTES 6 3 - 10 %    EOSINOPHILS 0 0 - 5 %    BASOPHILS 0 0 - 2 %    ABS. NEUTROPHILS 7.1 1.8 - 8.0 K/UL    ABS. LYMPHOCYTES 2.5 0.9 - 3.6 K/UL    ABS. MONOCYTES 0.6 0.05 - 1.2 K/UL    ABS. EOSINOPHILS 0.0 0.0 - 0.4 K/UL    ABS.  BASOPHILS 0.0 0.0 - 0.1 K/UL    DF AUTOMATED     METABOLIC PANEL, COMPREHENSIVE    Collection Time: 07/21/19  5:22 AM   Result Value Ref Range    Sodium 140 136 - 145 mmol/L    Potassium 4.5 3.5 - 5.5 mmol/L    Chloride 106 100 - 111 mmol/L    CO2 30 21 - 32 mmol/L    Anion gap 4 3.0 - 18 mmol/L Glucose 107 (H) 74 - 99 mg/dL    BUN 15 7.0 - 18 MG/DL    Creatinine 0.92 0.6 - 1.3 MG/DL    BUN/Creatinine ratio 16 12 - 20      GFR est AA >60 >60 ml/min/1.73m2    GFR est non-AA >60 >60 ml/min/1.73m2    Calcium 8.8 8.5 - 10.1 MG/DL    Bilirubin, total 0.5 0.2 - 1.0 MG/DL    ALT (SGPT) 21 16 - 61 U/L    AST (SGOT) 19 10 - 38 U/L    Alk.  phosphatase 82 45 - 117 U/L    Protein, total 6.4 6.4 - 8.2 g/dL    Albumin 3.6 3.4 - 5.0 g/dL    Globulin 2.8 2.0 - 4.0 g/dL    A-G Ratio 1.3 0.8 - 1.7     MAGNESIUM    Collection Time: 07/21/19  5:22 AM   Result Value Ref Range    Magnesium 1.9 1.6 - 2.6 mg/dL       New Studies:   None    Assessment/Plan:    77year old male with Right hip IT fx  -NPO  -To OR today for right hip ORIF    Signed by Guillermo Samaniego MD

## 2019-07-21 NOTE — PROGRESS NOTES
conducted an initial consultation and Spiritual Assessment for Noni Brunson, who is a 77 y.o.,male. Patients Primary Language is: [place]. According to the patients EMR Catholic Affiliation is: Other. The reason the Patient came to the hospital is:   Patient Active Problem List    Diagnosis Date Noted    Closed displaced intertrochanteric fracture of right femur ([facility] 07/21/2019    Femur fracture, right ([facility] 07/20/2019    Epilepsy ([facility] 07/12/2017    Congenital hemiparesis ([facility] 07/12/2017        The  provided the following Interventions:  Initiated a relationship of care and support. Explored issues of kathia, belief, spirituality and Episcopalian/ritual needs while hospitalized. Listened empathically. Provided chaplaincy education. Provided information about Spiritual Care Services. Offered prayer and assurance of continued prayers on patient's behalf. Chart reviewed. The following outcomes where achieved:  Patient shared limited information about both their medical narrative and spiritual journey/beliefs.  confirmed Patient's Catholic Affiliation. Patient processed feeling about current hospitalization. Patient expressed gratitude for 's visit. Assessment:  Patient does not have any Episcopalian/cultural needs that will affect patients preferences in health care. There are no spiritual or Episcopalian issues which require intervention at this time. Plan:  Chaplains will continue to follow and will provide pastoral care on an as needed/requested basis.  recommends bedside caregivers page  on duty if patient shows signs of acute spiritual or emotional distress.         [address] [facility].   [phone]

## 2019-07-21 NOTE — ROUTINE PROCESS
2000 Patient in bed AAOx4, watching tv, respiration unlabored. Opsite dressing to right hip dry and clean, CMS intact wiggle toes pedal pulses palpable.

## 2019-07-21 NOTE — BRIEF OP NOTE
BRIEF OPERATIVE NOTE    Date of Procedure: 7/21/2019   Preoperative Diagnosis: Closed fracture of right hip, initial encounter ([facility] [S72.001A]  Postoperative Diagnosis: Closed fracture of right hip, initial encounter ([facility] [S72.001A]    Procedure(s): FEMUR INSERTION INTRA MEDULLARY NAIL RIGHT  Surgeon(s) and Role:     * Heather Liu MD - Primary         Surgical Assistant: Santiago Starr, [facility]    Surgical Staff:  Circ-1: Francisco J Gomez  Physician Assistant: CORIE Knight  Radiology Technician: Brian Pedro  Scrub Tech-1: Montserrat Chakraborty  Surg Asst-1: Jose Ordonez  Event Time In Time Out   Incision Start 07/21/2019 8476    Incision Close 07/21/2019 0943      Anesthesia: General   Estimated Blood Loss: 50cc  Specimens: * No specimens in log *   Findings: Right hip intertrochanteric fracture   Complications: None  Implants:   Implant Name Type Inv.  Item Serial No.  Lot No. LRB No. Used Action   NAIL TYRONE 130D [id number] STRL -- [employer] - [id number]  NAIL TYRONE 130D [id number] STRL -- [employer]  [employer] [place] [id number] Right 1 Implanted   SCR FEN 90MM GLD STRL -- [employer] - [id number]  SCR FEN 90MM GLD STRL -- [employer]  [employer] [place] [id number] Right 1 Implanted   SCR BNE LCK T25 F/IM NL [id number] --  - [id number]  SCR BNE LCK T25 F/IM NL [id number] --   [employer] [place] [id number] Right 1 Implanted     Readmit medicine  24 hours IV abx  WBAT R LE  PT/OT  AC starting POD 1 lovenox 40mg daily x 4 weeks  Dispo planning, possible placement pending PT progress

## 2019-07-21 NOTE — ANESTHESIA PREPROCEDURE EVALUATION
Anesthetic History   No history of anesthetic complications            Review of Systems / Medical History  Patient summary reviewed and pertinent labs reviewed    Pulmonary  Within defined limits                 Neuro/Psych     seizures: well controlled    Neuromuscular disease    Comments: Polio hx Cardiovascular              Hyperlipidemia         GI/Hepatic/Renal  Within defined limits              Endo/Other        Arthritis and anemia     Other Findings              Physical Exam    Airway  Mallampati: II  TM Distance: 4 - 6 cm  Neck ROM: normal range of motion   Mouth opening: Normal     Cardiovascular    Rhythm: regular  Rate: normal         Dental    Dentition: Full upper dentures and Poor dentition  Comments: The patient has very poor dentition. Loose, broken, and or missing teeth. I explained the risk of dental damage and or loss. The patient understands and accepts these risks.      Pulmonary  Breath sounds clear to auscultation               Abdominal  GI exam deferred       Other Findings            Anesthetic Plan    ASA: 2  Anesthesia type: general          Induction: Intravenous  Anesthetic plan and risks discussed with: Patient

## 2019-07-22 LAB
ALBUMIN SERPL-MCNC: 3.3 G/DL (ref 3.4–5)
ALBUMIN/GLOB SERPL: 1.2 {RATIO} (ref 0.8–1.7)
ALP SERPL-CCNC: 69 U/L (ref 45–117)
ALT SERPL-CCNC: 16 U/L (ref 16–61)
ANION GAP SERPL CALC-SCNC: 5 MMOL/L (ref 3–18)
AST SERPL-CCNC: 22 U/L (ref 10–38)
BASOPHILS # BLD: 0 K/UL (ref 0–0.1)
BASOPHILS NFR BLD: 0 % (ref 0–2)
BILIRUB SERPL-MCNC: 0.5 MG/DL (ref 0.2–1)
BUN SERPL-MCNC: 10 MG/DL (ref 7–18)
BUN/CREAT SERPL: 12 (ref 12–20)
CALCIUM SERPL-MCNC: 8.5 MG/DL (ref 8.5–10.1)
CHLORIDE SERPL-SCNC: 105 MMOL/L (ref 100–111)
CO2 SERPL-SCNC: 27 MMOL/L (ref 21–32)
CREAT SERPL-MCNC: 0.85 MG/DL (ref 0.6–1.3)
DIFFERENTIAL METHOD BLD: ABNORMAL
EOSINOPHIL # BLD: 0 K/UL (ref 0–0.4)
EOSINOPHIL NFR BLD: 0 % (ref 0–5)
ERYTHROCYTE [DISTWIDTH] IN BLOOD BY AUTOMATED COUNT: 12.7 % (ref 11.6–14.5)
GLOBULIN SER CALC-MCNC: 2.7 G/DL (ref 2–4)
GLUCOSE SERPL-MCNC: 129 MG/DL (ref 74–99)
HBA1C MFR BLD: 5.2 % (ref 4.2–5.6)
HCT VFR BLD AUTO: 30.7 % (ref 36–48)
HGB BLD-MCNC: 10.4 G/DL (ref 13–16)
IRON SATN MFR SERPL: 8 %
IRON SERPL-MCNC: 17 UG/DL (ref 50–175)
LYMPHOCYTES # BLD: 2.2 K/UL (ref 0.9–3.6)
LYMPHOCYTES NFR BLD: 20 % (ref 21–52)
MAGNESIUM SERPL-MCNC: 1.8 MG/DL (ref 1.6–2.6)
MCH RBC QN AUTO: 29.9 PG (ref 24–34)
MCHC RBC AUTO-ENTMCNC: 33.9 G/DL (ref 31–37)
MCV RBC AUTO: 88.2 FL (ref 74–97)
MONOCYTES # BLD: 0.7 K/UL (ref 0.05–1.2)
MONOCYTES NFR BLD: 6 % (ref 3–10)
NEUTS SEG # BLD: 8.1 K/UL (ref 1.8–8)
NEUTS SEG NFR BLD: 74 % (ref 40–73)
PLATELET # BLD AUTO: 151 K/UL (ref 135–420)
PMV BLD AUTO: 10.1 FL (ref 9.2–11.8)
POTASSIUM SERPL-SCNC: 3.6 MMOL/L (ref 3.5–5.5)
PROT SERPL-MCNC: 6 G/DL (ref 6.4–8.2)
RBC # BLD AUTO: 3.48 M/UL (ref 4.7–5.5)
SODIUM SERPL-SCNC: 137 MMOL/L (ref 136–145)
TIBC SERPL-MCNC: 201 UG/DL (ref 250–450)
WBC # BLD AUTO: 11 K/UL (ref 4.6–13.2)

## 2019-07-22 PROCEDURE — 97116 GAIT TRAINING THERAPY: CPT

## 2019-07-22 PROCEDURE — 83540 ASSAY OF IRON: CPT

## 2019-07-22 PROCEDURE — 74011250637 HC RX REV CODE- 250/637: Performed by: FAMILY MEDICINE

## 2019-07-22 PROCEDURE — 97162 PT EVAL MOD COMPLEX 30 MIN: CPT

## 2019-07-22 PROCEDURE — 80053 COMPREHEN METABOLIC PANEL: CPT

## 2019-07-22 PROCEDURE — 97165 OT EVAL LOW COMPLEX 30 MIN: CPT

## 2019-07-22 PROCEDURE — 83036 HEMOGLOBIN GLYCOSYLATED A1C: CPT

## 2019-07-22 PROCEDURE — 97535 SELF CARE MNGMENT TRAINING: CPT

## 2019-07-22 PROCEDURE — 83735 ASSAY OF MAGNESIUM: CPT

## 2019-07-22 PROCEDURE — 74011250637 HC RX REV CODE- 250/637: Performed by: ORTHOPAEDIC SURGERY

## 2019-07-22 PROCEDURE — 36415 COLL VENOUS BLD VENIPUNCTURE: CPT

## 2019-07-22 PROCEDURE — 74011250636 HC RX REV CODE- 250/636: Performed by: ORTHOPAEDIC SURGERY

## 2019-07-22 PROCEDURE — 85025 COMPLETE CBC W/AUTO DIFF WBC: CPT

## 2019-07-22 PROCEDURE — 77010033678 HC OXYGEN DAILY: Performed by: FAMILY MEDICINE

## 2019-07-22 PROCEDURE — 65270000029 HC RM PRIVATE

## 2019-07-22 RX ADMIN — LATANOPROST 1 DROP: 50 SOLUTION OPHTHALMIC at 21:36

## 2019-07-22 RX ADMIN — FAMOTIDINE 20 MG: 20 TABLET ORAL at 09:15

## 2019-07-22 RX ADMIN — FAMOTIDINE 20 MG: 20 TABLET ORAL at 17:15

## 2019-07-22 RX ADMIN — CARBAMAZEPINE 400 MG: 200 TABLET, EXTENDED RELEASE ORAL at 21:15

## 2019-07-22 RX ADMIN — DOCUSATE SODIUM 100 MG: 100 CAPSULE, LIQUID FILLED ORAL at 17:15

## 2019-07-22 RX ADMIN — OXYCODONE HYDROCHLORIDE 5 MG: 5 TABLET ORAL at 14:11

## 2019-07-22 RX ADMIN — DOCUSATE SODIUM 100 MG: 100 CAPSULE, LIQUID FILLED ORAL at 09:00

## 2019-07-22 RX ADMIN — OXYCODONE HYDROCHLORIDE 5 MG: 5 TABLET ORAL at 09:14

## 2019-07-22 RX ADMIN — CARBAMAZEPINE 400 MG: 200 TABLET, EXTENDED RELEASE ORAL at 09:15

## 2019-07-22 RX ADMIN — ENOXAPARIN SODIUM 40 MG: 40 INJECTION SUBCUTANEOUS at 09:14

## 2019-07-22 RX ADMIN — PRAVASTATIN SODIUM 40 MG: 20 TABLET ORAL at 21:15

## 2019-07-22 RX ADMIN — ACETAMINOPHEN 650 MG: 325 TABLET ORAL at 23:38

## 2019-07-22 RX ADMIN — CEFAZOLIN 2 G: 10 INJECTION, POWDER, FOR SOLUTION INTRAVENOUS at 00:03

## 2019-07-22 NOTE — PROGRESS NOTES
Problem: Mobility Impaired (Adult and Pediatric)  Goal: *Acute Goals and Plan of Care (Insert Text)  Description  Physical Therapy Goals  Initiated 7/22/2019 and to be accomplished within 7 day(s)  1. Patient will move from supine to sit and sit to supine , scoot up and down and roll side to side in bed with minimal assistance/contact guard assist.     2.  Patient will transfer from bed to chair and chair to bed with minimal assistance/contact guard assist using the least restrictive device. 3.  Patient will perform sit to stand with minimal assistance/contact guard assist.  4.  Patient will ambulate with contact guard assist for 75 feet with the least restrictive device. Prior Level of Function: Independent with mobility including gait no AD. Pt lives alone in a 1st level apartment no steps to enter. Pt has a cane at home as needed. Outcome: Progressing Towards Goal   PHYSICAL THERAPY EVALUATION    Patient: Jackeline James (71 y.o. male)  Date: 7/22/2019  Primary Diagnosis: Femur fracture, right (HCC) [S72.91XA]  Procedure(s) (LRB):  FEMUR INSERTION INTRA MEDULLARY NAIL RIGHT (Right) 1 Day Post-Op   Precautions:   Fall, WBAT(RLE)    ASSESSMENT :  PT orders received and patient cleared by nursing to participate with therapy. Patient is a 77 y.o. male admitted to the hospital due to fall R hip fracture s/p R ORIF Dr. Koffi Schultz 7/21/19. Pt has history of polio (R sided weakness). Patient consents to PT evaluation and treatment. Pt educated on safety, mobility, therex, WB precautions, and OOB 3-5 times with nursing assistance. Performed sit to stands from recliner moderate assistance. Gait in room minimal assistance for advancing R LE and balance with RW 10 feet. Pt has decreased step length as well as increased hip and knee flexion on R side especially during stance phase. Pt fatigues quickly with activities. Pt ended therapy sitting in recliner with all need smet.      Patient will benefit from skilled intervention to address the above impairments. Patient's rehabilitation potential is considered to be Fair  Factors which may influence rehabilitation potential include:    ? None noted  ? Mental ability/status  ? Medical condition  ? Home/family situation and support systems  ? Safety awareness  ? Pain tolerance/management  ? Other:      PLAN :  Recommendations and Planned Interventions:    ?           Bed Mobility Training             ? Neuromuscular Re-Education  ? Transfer Training                   ? Orthotic/Prosthetic Training  ? Gait Training                          ? Modalities  ? Therapeutic Exercises           ? Edema Management/Control  ? Therapeutic Activities            ? Family Training/Education  ? Patient Education  ? Other (comment):    Frequency/Duration: Patient will be followed by physical therapy 1-2 times per day/4-7 days per week to address goals. Discharge Recommendations: Inpatient Rehab  Further Equipment Recommendations for Discharge: rolling walker     SUBJECTIVE:   Patient stated I'm tired.     OBJECTIVE DATA SUMMARY:     Past Medical History:   Diagnosis Date    Glaucoma     History of poliomyelitis     Hyperlipidemia     Seizures ([facility] [address]      Past Surgical History:   Procedure Laterality Date    COLONOSCOPY N/A 1/30/2019    COLONOSCOPY w bx's performed by Mabel Denis MD at [facility]    HX APPENDECTOMY       Barriers to Learning/Limitations: yes;  altered mental status (i.e.Sedation, Confusion)  Compensate with: Visual Cues, Verbal Cues and Tactile Cues  Home Situation:  Home Situation  Home Environment: Apartment  # Steps to Enter: 0  One/Two Story Residence: One story  Living Alone: Yes  Support Systems: Family member(s)  Patient Expects to be Discharged to[de-identified] Apartment  Current DME Used/Available at Home: Cane, straight  Tub or Shower Type: Tub/Shower combination  Critical Behavior:  Neurologic State: Alert  Orientation Level: Oriented to person;Oriented to place  Cognition: Follows commands  Safety/Judgement: Fall prevention  Psychosocial  Patient Behaviors: Calm; Cooperative  Skin Condition/Temp: Dry;Warm     Skin Integrity: Incision (comment)(Right Hip)  Skin Integumentary  Skin Color: Appropriate for ethnicity  Skin Condition/Temp: Dry;Warm  Skin Integrity: Incision (comment)(Right Hip)     B LE Strength:    Strength: (R ankle 3-/5, knee and hip 2-/5; L 4+/5)              B LE Tone & Sensation:   Tone: Normal          Sensation: Intact           B LE Range Of Motion:  AROM: Generally decreased, functional(R limited hip and knee due to pain)        PROM: Generally decreased, functional        Functional Mobility:  Bed Mobility:              Transfers:  Sit to Stand: Moderate assistance   Stand to Sit: Minimum assistance                       Balance:   Sitting: Impaired; With support  Sitting - Static: Good (unsupported)  Sitting - Dynamic: Fair (occasional)  Standing: Impaired; With support  Standing - Static: Fair  Standing - Dynamic : Fair  Ambulation/Gait Training:  Distance (ft): 10 Feet (ft)  Ambulation - Level of Assistance: Minimal assistance  Gait Abnormalities: Antalgic;Decreased step clearance(needs A with advancing R LE)  Right Side Weight Bearing: As tolerated  Base of Support: Center of gravity altered;Shift to left  Stance: Left increased;Right decreased  Speed/Delores: Slow  Step Length: Right shortened;Left shortened  Swing Pattern: Right asymmetrical       Therapeutic Exercises:   Reviewed and performed ankle pumps and quad sets to increase blood flow and circulation. Pain:  Pain level pre-treatment: mild/moderate R LE  During: 10/10 with weight on R LE  Pain level post-treatment: moderate R LE  Pain Intervention(s) : Medication (see MAR);  Rest, Ice, Repositioning  Response to intervention: Nurse notified, See doc flow    Activity Tolerance:   fair  Please refer to the flowsheet for vital signs taken during this treatment. After treatment:   ?         Patient left in no apparent distress sitting up in chair  ? Patient left in no apparent distress in bed  ? Call bell left within reach  ? Personal items in reach   ? Nursing notified Casey Grant  ? Caregiver present  ? Bed/chair alarm activated  *        SCDs applied (pt refused, educated importance with pt continuing to refuse     COMMUNICATION/EDUCATION:   ?         Role of Physical Therapy in the acute care setting. ?         Fall prevention education was provided and the patient/caregiver indicated understanding. ? Patient/family have participated as able in goal setting and plan of care. ?         Patient/family agree to work toward stated goals and plan of care. ?         Patient understands intent and goals of therapy, but is neutral about his/her participation. ? Patient is unable to participate in goal setting/plan of care: ongoing with therapy staff. ?         Out of bed with nursing assistance 3-5 times a day. ? Other:     Thank you for this referral.  Jackelyn Dimas, PT, DPT   Time Calculation: 23 mins      Eval Complexity: History: MEDIUM  Complexity : 1-2 comorbidities / personal factors will impact the outcome/ POC Exam:HIGH Complexity : 4+ Standardized tests and measures addressing body structure, function, activity limitation and / or participation in recreation  Presentation: MEDIUM Complexity : Evolving with changing characteristics  Clinical Decision Making:Medium Complexity    Overall Complexity:MEDIUM

## 2019-07-22 NOTE — PROGRESS NOTES
Problem: Self Care Deficits Care Plan (Adult)  Goal: *Acute Goals and Plan of Care (Insert Text)  Description  Occupational Therapy Goals  Initiated 7/22/2019 within 7 day(s). 1.  Patient will perform functional activity/ADL task seated with independence for 3-5 minutes and G balance. 2.  Patient will perform upper body dressing standing with independence and G balance. 3.  Patient will perform lower body dressing with supervision/set-up using AE prn  4. Patient will perform toilet transfers with modified independence. 5.  Patient will perform all aspects of toileting with modified independence. 6.  Patient will participate in upper extremity therapeutic exercise/activities with independence for 5-7 minutes to increase BUE strength for self-care/ADLs. 7.  Patient will utilize energy conservation techniques during functional activities with verbal cues. Prior Level of Function: Patient was independent with self-care and functional mobility PTA. Pt reports having a SPC at home but does not need it for functional mobility. 7/22/2019 0930 by Rigo Mcwilliams, OTR/L  Outcome: Progressing Towards Goal     OCCUPATIONAL THERAPY EVALUATION    Patient: Tyree Pimentel (77 y.o. male)  Date: 7/22/2019  Primary Diagnosis: Femur fracture, right (HCC) [S72.91XA]  Procedure(s) (LRB):  FEMUR INSERTION INTRA MEDULLARY NAIL RIGHT (Right) 1 Day Post-Op   Precautions:Fall, WBAT(RLE)    ASSESSMENT :  Upon entering the room, the pt was seated in recliner, alert, and agreeable to participate in OT evaluation. Pt educated on the role of OT, WB status, and adaptive equipment (reacher, sock aid, long handled sponge, long handled shoe horn) secondary to increased pain in RLE with LBD. Pt previously issued precautions handout, OT educated pt on not having precautions s/p sx and patient demonstrated good understanding. Pt mod assist for sit to stand and min assist for stand to sit, vcs needed for placement of hands and for safety. Pt total assist for LBD and required additional time to complete functional transfers due to 10/10 pain in RLE. Pt left seated in recliner with new ice pack donned, call bell in reach, and all needs met. Based on the objective data described below, the patient presents with increased pain, decreased strength, decreased endurance, decreased safety awareness, decreased functional balance, and decreased functional mobility, which impedes pt performance in basic self-care/ADL tasks. Pt would benefit from skilled OT to restore PLOF and maximize function. Patient will benefit from skilled intervention to address the above impairments. Patient's rehabilitation potential is considered to be Good  Factors which may influence rehabilitation potential include:   ? None noted  ? Mental ability/status  ? Medical condition  ? Home/family situation and support systems  ? Safety awareness  ? Pain tolerance/management  ? Other:      PLAN :  Recommendations and Planned Interventions:   ?               Self Care Training                  ? Therapeutic Activities  ? Functional Mobility Training   ? Cognitive Retraining  ? Therapeutic Exercises           ? Endurance Activities  ? Balance Training                    ? Neuromuscular Re-Education  ? Visual/Perceptual Training     ? Home Safety Training  ? Patient Education                   ? Family Training/Education  ? Other (comment):    Frequency/Duration: Patient will be followed by occupational therapy 1-2 times per day/4-7 days per week to address goals. Discharge Recommendations: Misha Simmons  Further Equipment Recommendations for Discharge: rolling walker     SUBJECTIVE:   Patient stated Did they put screws in me?     OBJECTIVE DATA SUMMARY:     Past Medical History: Diagnosis Date    Glaucoma     History of poliomyelitis     Hyperlipidemia     Seizures (HCC)      Past Surgical History:   Procedure Laterality Date    COLONOSCOPY N/A 1/30/2019    COLONOSCOPY w bx's performed by Jud Duncan MD at [address] HX APPENDECTOMY       Barriers to Learning/Limitations: None  Compensate with: visual, verbal, tactile, kinesthetic cues/model    Home Situation:   Home Situation  Home Environment: Apartment  # Steps to Enter: 0  One/Two Story Residence: One story  Living Alone: Yes  Support Systems: Family member(s)  Patient Expects to be Discharged to[de-identified] Apartment  Current DME Used/Available at Home: Cane, straight  Tub or Shower Type: Tub/Shower combination  ? Right hand dominant   ? Left hand dominant    Cognitive/Behavioral Status:  Neurologic State: Alert  Orientation Level: Oriented to person;Oriented to place;Oriented to situation  Cognition: Follows commands  Safety/Judgement: Fall prevention    Skin: Intact  Edema: None noted    Vision/Perceptual:    Acuity: Within Defined Limits;Able to read normal print without difficulty    Corrective Lenses: Reading glasses    Coordination: BUE  Fine Motor Skills-Upper: Right Impaired;Left Intact    Gross Motor Skills-Upper: Right Intact; Left Intact    Balance:  Sitting: Impaired; Without support  Sitting - Static: Good (unsupported)  Sitting - Dynamic: Fair (occasional)  Standing: Impaired; With support  Standing - Static: Fair  Standing - Dynamic : Fair    Strength: BUE  Strength: Within Functional Limits    Tone & Sensation: BUE  Sensation: Intact  Tone: Normal    Range of Motion: BUE  AROM: Within Functional Limits    Functional Mobility and Transfers for ADLs:    Transfers:  Sit to Stand:  Moderate assistance(from recliner )  Stand to Sit: Minimum assistance    ADL Assessment:   Feeding: Independent    Oral Facial Hygiene/Grooming: Independent    Bathing: Maximum assistance    Upper Body Dressing: Independent    Lower Body Dressing: Total assistance    Toileting: Supervision;Stand by assistance    ADL Intervention:  Lower Body Dressing Assistance  Dressing Assistance: Total assistance(dependent)  Socks: Total assistance (dependent)    Cognitive Retraining  Safety/Judgement: Fall prevention    Pain:  Pain level pre-treatment: 10/10, RLE   Pain level post-treatment: 10/10, RLE   Pain Intervention(s): Medication (see MAR); Response to intervention: See doc flow    Activity Tolerance:   Patient demonstrated decreased activity tolerance during OT evaluation. Please refer to the flowsheet for vital signs taken during this treatment. After treatment:   ? Patient left in no apparent distress sitting up in chair  ? Patient left in no apparent distress in bed  ? Call bell left within reach  ? Nursing notified  ? Caregiver present  ? Bed alarm activated    COMMUNICATION/EDUCATION:   ? Role of Occupational Therapy in the acute care setting  ? Home safety education was provided and the patient/caregiver indicated understanding. ? Patient/family have participated as able in goal setting and plan of care. ? Patient/family agree to work toward stated goals and plan of care. ? Patient understands intent and goals of therapy, but is neutral about his/her participation. ? Patient is unable to participate in goal setting and plan of care. Thank you for this referral.  Jamie Quintero OTR/L  Time Calculation: 24 mins    Eval Complexity: History: MEDIUM Complexity : Expanded review of history including physical, cognitive and psychosocial  history ; Examination: MEDIUM Complexity : 3-5 performance deficits relating to physical, cognitive , or psychosocial skils that result in activity limitations and / or participation restrictions; Decision Making:MEDIUM Complexity : Patient may present with comorbidities that affect occupational performnce.  Miniml to moderate modification of tasks or assistance (eg, physical or verbal ) with assesment(s) is necessary to enable patient to complete evaluation

## 2019-07-22 NOTE — PROGRESS NOTES
Problem: Falls - Risk of  Goal: *Absence of Falls  Description  Document Elio Pradhan Fall Risk and appropriate interventions in the flowsheet. Outcome: Progressing Towards Goal  Note:   Fall Risk Interventions:  Mobility Interventions: Patient to call before getting OOB, PT Consult for mobility concerns         Medication Interventions: Patient to call before getting OOB, Teach patient to arise slowly    Elimination Interventions: Call light in reach, Urinal in reach    History of Falls Interventions: Investigate reason for fall, Room close to nurse's station         Problem: Patient Education: Go to Patient Education Activity  Goal: Patient/Family Education  Outcome: Progressing Towards Goal     Problem: Pain  Goal: *Control of Pain  Outcome: Progressing Towards Goal     Problem: Patient Education: Go to Patient Education Activity  Goal: Patient/Family Education  Outcome: Progressing Towards Goal     Problem: Pressure Injury - Risk of  Goal: *Prevention of pressure injury  Description  Document Venancio Scale and appropriate interventions in the flowsheet.   Outcome: Progressing Towards Goal  Note:   Pressure Injury Interventions:       Moisture Interventions: Absorbent underpads    Activity Interventions: PT/OT evaluation, Increase time out of bed    Mobility Interventions: PT/OT evaluation    Nutrition Interventions: Document food/fluid/supplement intake                     Problem: Patient Education: Go to Patient Education Activity  Goal: Patient/Family Education  Outcome: Progressing Towards Goal     Problem: Infection - Risk of, Surgical Site Infection  Goal: *Absence of surgical site infection signs and symptoms  Outcome: Progressing Towards Goal     Problem: Patient Education: Go to Patient Education Activity  Goal: Patient/Family Education  Outcome: Progressing Towards Goal     Problem: Patient Education: Go to Patient Education Activity  Goal: Patient/Family Education  Outcome: Progressing Towards Goal

## 2019-07-22 NOTE — PROGRESS NOTES
Progress Note    Patient: aNncy Villarreal MRN: [id number]  CSN: [id number]    YOB: 1950  Age: 77 y.o. Sex: male    DOA: 7/20/2019 LOS:  LOS: 2 days                    Subjective:   POD #2 IM nail R femur. Patient resting comfortably in recliner. States he feels weak and his pain is managed okay. Expresses difficulty moving around and getting out of the bed, otherwise patient denies concerns. Opportunity for questions provided. Chief Complaint:   Chief Complaint   Patient presents with    Fall    Hip Pain       Review of systems  General: Denies fevers or chills. Cardiovascular: Denies chest pain or pressure, palpitations. Pulmonary: Denies shortness of breath, cough or wheeze. Gastrointestinal: Denies abdominal pain, nausea, or vomiting. States passing flatus, no bowel movement. Genitourinary: Denies urinary frequency, urgency, hesitancy or dysuria. Musculoskeletal: Positive for weakness and decreased ROM in RLE. Pain present in R hip, otherwise denies joint pain. Neurologic: Denies headache. Objective:     Physical Exam:  Visit Vitals  /75 (BP 1 Location: Right arm, BP Patient Position: At rest)   Pulse 96   Temp 97.8 °F (36.6 °C)   Resp 13   Ht 5' 9\" (1.753 m)   Wt 63 kg (139 lb)   SpO2 100%   BMI 20.53 kg/m²        General:         Alert, cooperative, no acute distress    HEENT: NC, Atraumatic. Anicteric sclerae. Lungs: CTAB. No Wheezing/Rhonchi/Rales. Heart:  Regular rate and rhythm.  No murmur, rubs or gallops. Abdomen: Soft, non distended, non tender.  Bowel sounds present. Extremities: No edema. Dressing to R lateral hip and thigh clean, dry and intact. Pulses 2+ B radial arteries and B dorsalis pedis arteries. Lower extremities: Full ROM in B feet. Pain present in R hip & thigh with flexion of R knee. Psych:   Not anxious or agitated. Neurologic:  CN 2-12 grossly intact, Alert and oriented X 3. No acute neurological deficits.     Intake and Output:  Current Shift:  07/22 0701 - 07/22 1900  In: 225 [P.O.:225]  Out: 150 [Urine:150]  Last three shifts:  07/20 1901 - 07/22 0700  In: 1040 [P.O.:440; I.V.:600]  Out: 475 [Urine:475]    Labs: Results:       Chemistry Recent Labs     07/22/19  0507 07/21/19  0522 07/20/19  1225   * 107* 136*    140 144   K 3.6 4.5 3.7    106 110   CO2 27 30 28   BUN 10 15 17   CREA 0.85 0.92 0.93   CA 8.5 8.8 8.6   AGAP 5 4 6   BUCR 12 16 18   AP 69 82 86   TP 6.0* 6.4 6.7   ALB 3.3* 3.6 3.9   GLOB 2.7 2.8 2.8   AGRAT 1.2 1.3 1.4      CBC w/Diff Recent Labs     07/22/19  0507 07/21/19  0522 07/20/19  1225   WBC 11.0 10.2 8.5   RBC 3.48* 3.94* 4.25*   HGB 10.4* 11.9* 13.1   HCT 30.7* 35.2* 37.7    172 156   GRANS 74* 70 46   LYMPH 20* 24 50   EOS 0 0 1      Cardiac Enzymes Recent Labs     07/20/19  1916      CKND1 CALCULATION NOT PERFORMED WHEN RESULT IS BELOW LINEAR LIMIT      Coagulation Recent Labs     07/20/19  1225   PTP 14.2   INR 1.1       Lipid Panel Lab Results   Component Value Date/Time    Cholesterol, total 198 07/21/2019 05:22 AM    HDL Cholesterol 108 (H) 07/21/2019 05:22 AM    LDL, calculated 76 07/21/2019 05:22 AM    VLDL, calculated 14 07/21/2019 05:22 AM    Triglyceride 70 07/21/2019 05:22 AM    CHOL/HDL Ratio 1.8 07/21/2019 05:22 AM      BNP No results for input(s): BNPP in the last 72 hours.    Liver Enzymes Recent Labs     07/22/19  0507   TP 6.0*   ALB 3.3*   AP 69   SGOT 22      Thyroid Studies Lab Results   Component Value Date/Time    TSH 0.67 07/21/2019 05:22 AM          Procedures/imaging: see electronic medical records for all procedures/Xrays and details which were not copied into this note but were reviewed prior to creation of Plan    Medications:   Current Facility-Administered Medications   Medication Dose Route Frequency    enoxaparin (LOVENOX) injection 40 mg  40 mg SubCUTAneous DAILY    oxyCODONE IR (ROXICODONE) tablet 5 mg  5 mg Oral Q4H PRN    HYDROmorphone (DILAUDID) injection 1 mg  1 mg IntraVENous Q4H PRN    acetaminophen (TYLENOL) tablet 650 mg  650 mg Oral Q4H PRN    carBAMazepine XR (TEGretol XR) tablet 400 mg  400 mg Oral Q12H    latanoprost (XALATAN) 0.005 % ophthalmic solution 1 Drop  1 Drop Both Eyes QHS    pravastatin (PRAVACHOL) tablet 40 mg  40 mg Oral QHS    docusate sodium (COLACE) capsule 100 mg  100 mg Oral BID    bisacodyl (DULCOLAX) suppository 10 mg  10 mg Rectal DAILY PRN    famotidine (PEPCID) tablet 20 mg  20 mg Oral BID       Assessment/Plan     Active Problems:    Epilepsy ([facility] [address] (7/12/2017)      Congenital hemiparesis ([facility] [address] (7/12/2017)      Femur fracture, right ([facility] [address] (7/20/2019)      Closed displaced intertrochanteric fracture of right femur ([facility] [address] (7/21/2019)    Plan:  Femur fracture, right s/p IM nail  PT and OT consulted. Per recs, discussed SNF placement with patient's niece and unit . Continue current treatment plan - DVT prophylaxis with Lovenox and SCDs. GI prophylaxis with Pepcid. No bowel movement since admission  Encourage patient to eat Cardiac diet as ordered, as well as hydration. Continue docusate scheduled. Consider PRN dulcolax if needed. Elevated blood sugar  Order HgbA1c. Check CMP and magnesium in the morning. Decreased hemoglobin  Order labs to check iron, vitamin B12 and folate. Check CBC in the morning.     History of seizures  Continue current medication regimen    Juvencio Coy  7/22/2019 11:05 AM

## 2019-07-22 NOTE — PROGRESS NOTES
Reason for Admission:  Femur fracture, right ([facility] [address] [S72.91XA]                 RRAT Score:    12            Plan for utilizing home health:    no                      Likelihood of Readmission:   LOW                         Transition of Care Plan:              Initial assessment completed with patient. Cognitive status of patient: oriented to time, place, person and situation. Face sheet information confirmed:  yes. The patient designates Josselyn May @ [phone]  to participate in his discharge plan and to receive any needed information. This patient lives in a duplex home with patient. Patient is not able to navigate steps as needed. Prior to hospitalization, patient was considered to be independent with ADLs/IADLS : yes . Patient has a current ACP document on file: yes  The patient and other:  Josselyn May  will be available to transport patient home upon discharge. The patient already has [employer] equipment available in the home. Patient is not currently active with home health. Patient has not stayed in a skilled nursing facility or rehab. Was  stay within last 60 days : no. This patient is on dialysis :no    List of available SNF agencies were provided and reviewed with the patient prior to discharge. Freedom of choice signed: yes, for [facility] . [address] [facility], [address], and [facility]. . Currently, the discharge plan is SNF. The patient states that he can obtain his medications from the pharmacy, and take his medications as directed. Patient's current insurance is Medicare and Medicaid. Care Management Interventions  PCP Verified by CM:  Yes  Last Visit to PCP: 07/16/19  Mode of Transport at Discharge: Self  Transition of Care Consult (CM Consult): SNF  Physical Therapy Consult: Yes  Occupational Therapy Consult: Yes  Current Support Network: Lives Alone  Confirm Follow Up Transport: Family  Plan discussed with Pt/Family/Caregiver: Yes  Freedom of Choice Offered: Yes  Discharge Location  Discharge Placement: [address], [address] [place] [address].  [phone]

## 2019-07-22 NOTE — CDMP QUERY
Patient noted to have \"anemia, most likely post op\" documented. Could you please clarify if  patient is being evaluated / managed for:  => anemia due to acute blood loss  => other explanation of clinical findings  => unable to determine    The medical record reflects the following:  -----> Risk Factors: 77 yr old with right hip fracture s/p ORIF     -----> Clinical Indicators:        * Labs: 7-20-19 H/H: 13.1/37.7;     7-21-19 H/H: 11.9/35.2;     7-22-19 H/H: 10.4/30.7;   7-23-19 H/H:  9.3/27.4       * 7-21-19: OR: Right hip open reduction and internal fixation with cephalomedullary nail;  EBL: 50 ml;        * 7-22-19 Hospitalist PN:  \". Sara January Sara January Sara January Patient resting comfortably in recliner. States he feels weak. Sara January Sara January Plan:. Sara January Sara January Anemia Most likely post op. Sara January Sara January \"    -----> Treatment: labs;  monitor      Thank you for your time,  Manjeet Paige, RN  [phone]

## 2019-07-22 NOTE — ROUTINE PROCESS
Bedside and Verbal shift change report given to Daniel [address] (oncoming nurse) by Rosie Shirley RN (offgoing nurse). Report included the following information SBAR, Kardex, Intake/Output, MAR, Accordion and Recent Results.

## 2019-07-22 NOTE — PROGRESS NOTES
[facility]    Progress Note      Patient: Yulia Del Rio               Sex: male          DOA: 7/20/2019         YOB: 1950      Age:  77 y.o.        LOS:  LOS: 2 days         S/P  Procedure(s): FEMUR INSERTION INTRA MEDULLARY NAIL RIGHT               Subjective:     No complaints Tolerating diet Ambulating Voiding q shift. Pt pain is managed on current medications. He has participated in PT and understands he is WBAT RLE. Denies cp, sob, abd pain   Objective:      Blood pressure 124/72, pulse 89, temperature 99.4 °F (37.4 °C), resp. rate 14, height 5' 9\" (1.753 m), weight 139 lb (63 kg), SpO2 97 %. Well developed, Well Nourished alert, cooperative, no distress  Incision clean, dry, no drainage, dressing in place   minimal swelling and tenderness noted in right lower extremity (hip)  Sensory is intact   Motor is intact  nv intact  2+distal pulses  Neg calf tenderness  Neg for facial asymmetry     Labs:  CBC  @  CBC:   Lab Results   Component Value Date/Time    WBC 11.0 07/22/2019 05:07 AM    RBC 3.48 (L) 07/22/2019 05:07 AM    HGB 10.4 (L) 07/22/2019 05:07 AM    HCT 30.7 (L) 07/22/2019 05:07 AM    PLATELET 697 66/12/6482 05:07 AM     CMP:   Lab Results   Component Value Date/Time    Glucose 129 (H) 07/22/2019 05:07 AM    Sodium 137 07/22/2019 05:07 AM    Potassium 3.6 07/22/2019 05:07 AM    Chloride 105 07/22/2019 05:07 AM    CO2 27 07/22/2019 05:07 AM    BUN 10 07/22/2019 05:07 AM    Creatinine 0.85 07/22/2019 05:07 AM    Calcium 8.5 07/22/2019 05:07 AM    Anion gap 5 07/22/2019 05:07 AM    BUN/Creatinine ratio 12 07/22/2019 05:07 AM    Alk.  phosphatase 69 07/22/2019 05:07 AM    Protein, total 6.0 (L) 07/22/2019 05:07 AM    Albumin 3.3 (L) 07/22/2019 05:07 AM    Globulin 2.7 07/22/2019 05:07 AM    A-G Ratio 1.2 07/22/2019 05:07 AM   @  Coagulation  Lab Results   Component Value Date    INR 1.1 07/20/2019      Basic Metabolic Profile  Lab Results   Component Value Date     07/22/2019    CO2 27 07/22/2019    BUN 10 07/22/2019       Medications Reviewed      Assessment/Plan     Active Problems:    Epilepsy ([address] (7/12/2017)      Congenital hemiparesis ([address] (7/12/2017)      Femur fracture, right ([address] (7/20/2019)      Closed displaced intertrochanteric fracture of right femur ([address] (7/21/2019)        Procedure(s): FEMUR INSERTION INTRA MEDULLARY NAIL RIGHT :     1. PT and/or OT Consults: YES continue PT/OT: oob to chair, gentle rom, WBAT RLE  2. Incision Care:  Routine Incision Care and Dressing Changes as necessary: dressing changes POD#2 and as needed  3. Pain control  4. DVT Prophylaxis - SCD in place, Lovenox 4 weeks post op in chart for discharge. DISCHARGE PLANNING     Intervention : Misha Simmons ([facility]) recommended by PT for safety. [id number] Shanta Wagner for discharge from Orthopaedic standpoint.          Nik[facility]cock [facility]  [phone]

## 2019-07-22 NOTE — PROGRESS NOTES
Progress Note    Patient: Guy Stanley MRN: [id number]  CSN: [id number]    YOB: 1950  Age: 77 y.o. Sex: male    DOA: 7/20/2019 LOS:  LOS: 2 days                    Subjective:   POD #2 IM nail R femur. Patient resting comfortably in recliner. States he feels weak and his pain is managed okay. Expresses difficulty moving around and getting out of the bed, otherwise patient denies concerns. He thinks can not go home needs more help and pt     Discussed with case manger she is working on approval for SNF     Chief Complaint:   Chief Complaint   Patient presents with   [facility] Fall    Hip Pain       Review of systems  General: Denies fevers or chills. Cardiovascular: Denies chest pain or pressure, palpitations. Pulmonary: Denies shortness of breath, cough or wheeze. Gastrointestinal: Denies abdominal pain, nausea, or vomiting. States passing flatus, no bowel movement yet. Genitourinary: Denies urinary frequency, urgency, hesitancy or dysuria. Musculoskeletal: Positive for weakness and decreased ROM in RLE. Pain present in R hip, otherwise denies joint pain. Neurologic: Denies headache. H/o seizure     Objective:     Physical Exam:  Visit Vitals  /75 (BP 1 Location: Right arm, BP Patient Position: At rest)   Pulse 96   Temp 97.8 °F (36.6 °C)   Resp 13   Ht 5' 9\" (1.753 m)   Wt 63 kg (139 lb)   SpO2 100%   BMI 20.53 kg/m²        General:         Alert, cooperative, no acute distress    HEENT: NC, Atraumatic. Anicteric sclerae. Lungs: CTAB. No Wheezing/Rhonchi/Rales. Heart:  Regular rate and rhythm.  No murmur, rubs or gallops. Abdomen: Soft, non distended, non tender.  Bowel sounds present. Extremities: No edema. Dressing to R lateral hip and thigh clean, dry and intact. Pulses 2+ B/L radial arteries and B/L  dorsalis pedis arteries. Lower extremities: Full ROM in B feet. Pain present in R hip & thigh with flexion of R knee. Psych:   Not anxious or agitated.   Neurologic:  CN 2-12 grossly intact, Alert and oriented X 3. No acute neurological deficits. Intake and Output:  Current Shift:  07/22 0701 - 07/22 1900  In: 225 [P.O.:225]  Out: 150 [Urine:150]  Last three shifts:  07/20 1901 - 07/22 0700  In: 1040 [P.O.:440; I.V.:600]  Out: 475 [Urine:475]    Labs: Results:       Chemistry Recent Labs     07/22/19  0507 07/21/19  0522 07/20/19  1225   * 107* 136*    140 144   K 3.6 4.5 3.7    106 110   CO2 27 30 28   BUN 10 15 17   CREA 0.85 0.92 0.93   CA 8.5 8.8 8.6   AGAP 5 4 6   BUCR 12 16 18   AP 69 82 86   TP 6.0* 6.4 6.7   ALB 3.3* 3.6 3.9   GLOB 2.7 2.8 2.8   AGRAT 1.2 1.3 1.4      CBC w/Diff Recent Labs     07/22/19  0507 07/21/19  0522 07/20/19  1225   WBC 11.0 10.2 8.5   RBC 3.48* 3.94* 4.25*   HGB 10.4* 11.9* 13.1   HCT 30.7* 35.2* 37.7    172 156   GRANS 74* 70 46   LYMPH 20* 24 50   EOS 0 0 1      Cardiac Enzymes Recent Labs     07/20/19  1916      CKND1 CALCULATION NOT PERFORMED WHEN RESULT IS BELOW LINEAR LIMIT      Coagulation Recent Labs     07/20/19  1225   PTP 14.2   INR 1.1       Lipid Panel Lab Results   Component Value Date/Time    Cholesterol, total 198 07/21/2019 05:22 AM    HDL Cholesterol 108 (H) 07/21/2019 05:22 AM    LDL, calculated 76 07/21/2019 05:22 AM    VLDL, calculated 14 07/21/2019 05:22 AM    Triglyceride 70 07/21/2019 05:22 AM    CHOL/HDL Ratio 1.8 07/21/2019 05:22 AM      BNP No results for input(s): BNPP in the last 72 hours.    Liver Enzymes Recent Labs     07/22/19  0507   TP 6.0*   ALB 3.3*   AP 69   SGOT 22      Thyroid Studies Lab Results   Component Value Date/Time    TSH 0.67 07/21/2019 05:22 AM          Procedures/imaging: see electronic medical records for all procedures/Xrays and details which were not copied into this note but were reviewed prior to creation of Plan    Medications:   Current Facility-Administered Medications   Medication Dose Route Frequency    enoxaparin (LOVENOX) injection 40 mg  40 mg SubCUTAneous DAILY    oxyCODONE IR (ROXICODONE) tablet 5 mg  5 mg Oral Q4H PRN    HYDROmorphone (DILAUDID) injection 1 mg  1 mg IntraVENous Q4H PRN    acetaminophen (TYLENOL) tablet 650 mg  650 mg Oral Q4H PRN    carBAMazepine XR (TEGretol XR) tablet 400 mg  400 mg Oral Q12H    latanoprost (XALATAN) 0.005 % ophthalmic solution 1 Drop  1 Drop Both Eyes QHS    pravastatin (PRAVACHOL) tablet 40 mg  40 mg Oral QHS    docusate sodium (COLACE) capsule 100 mg  100 mg Oral BID    bisacodyl (DULCOLAX) suppository 10 mg  10 mg Rectal DAILY PRN    famotidine (PEPCID) tablet 20 mg  20 mg Oral BID       Assessment/Plan     Active Problems:    Epilepsy ([address] (7/12/2017)      Congenital hemiparesis ([address] (7/12/2017)      Femur fracture, right ([facility] [address] (7/20/2019)      Closed displaced intertrochanteric fracture of right femur ([facility] [address] (7/21/2019)    Plan:  Femur fracture, right s/p IM nail  PT and OT consulted. OT recommended SNF pt still pending   Per recs, discussed SNF placement with patient's niece and unit . His niece would like him to go to [facility] first choice , [facility] second choice  Continue current treatment plan - DVT prophylaxis with Lovenox and SCDs. GI prophylaxis with Pepcid. No bowel movement since admission  Encourage patient to eat Cardiac diet as ordered, as well as hydration. Continue docusate scheduled. Colace 100 mg BID  Add Movantk 25 mg daily   dulcolax if needed. Elevated blood sugar  Order HgbA1c. Check CMP and magnesium in the morning. Anemia  Most likely post op  Order labs to check iron profile , vitamin B12 and folate. Check CBC in the morning.     History of seizures  Continue current medication regimen    DVT prophylaxis Lovenox and SCD / GI prophylaxis Pepcid    Pt seen and examined independently discussed note with student with appropriate changes     Esteban Gaspar MD  7/22/2019 11:05 AM

## 2019-07-22 NOTE — PROGRESS NOTES
PT orders received, chart reviewed. Pt unable to participate with PT due to:  []  Nausea/vomiting  []  Eating  []  Pain  []  Pt lethargic  []  Off Unit  []  Pt refused  [x]  Other pt was up with nursing this morning and just finished with OT. Pt reports being tired at this time. Will f/u later as patient's schedule allows.  Thank you for this referral.  Crystal Cheng, PT, DPT

## 2019-07-23 LAB
ALBUMIN SERPL-MCNC: 2.8 G/DL (ref 3.4–5)
ALBUMIN/GLOB SERPL: 0.9 {RATIO} (ref 0.8–1.7)
ALP SERPL-CCNC: 63 U/L (ref 45–117)
ALT SERPL-CCNC: 14 U/L (ref 16–61)
ANION GAP SERPL CALC-SCNC: 5 MMOL/L (ref 3–18)
AST SERPL-CCNC: 24 U/L (ref 10–38)
BASOPHILS # BLD: 0 K/UL (ref 0–0.1)
BASOPHILS NFR BLD: 0 % (ref 0–2)
BILIRUB SERPL-MCNC: 0.4 MG/DL (ref 0.2–1)
BUN SERPL-MCNC: 13 MG/DL (ref 7–18)
BUN/CREAT SERPL: 15 (ref 12–20)
CALCIUM SERPL-MCNC: 8.2 MG/DL (ref 8.5–10.1)
CHLORIDE SERPL-SCNC: 104 MMOL/L (ref 100–111)
CO2 SERPL-SCNC: 28 MMOL/L (ref 21–32)
CREAT SERPL-MCNC: 0.85 MG/DL (ref 0.6–1.3)
DIFFERENTIAL METHOD BLD: ABNORMAL
EOSINOPHIL # BLD: 0 K/UL (ref 0–0.4)
EOSINOPHIL NFR BLD: 0 % (ref 0–5)
ERYTHROCYTE [DISTWIDTH] IN BLOOD BY AUTOMATED COUNT: 12.4 % (ref 11.6–14.5)
FOLATE SERPL-MCNC: 19.7 NG/ML (ref 3.1–17.5)
GLOBULIN SER CALC-MCNC: 3.1 G/DL (ref 2–4)
GLUCOSE SERPL-MCNC: 104 MG/DL (ref 74–99)
HCT VFR BLD AUTO: 27.4 % (ref 36–48)
HGB BLD-MCNC: 9.3 G/DL (ref 13–16)
LYMPHOCYTES # BLD: 3.4 K/UL (ref 0.9–3.6)
LYMPHOCYTES NFR BLD: 31 % (ref 21–52)
MAGNESIUM SERPL-MCNC: 1.8 MG/DL (ref 1.6–2.6)
MCH RBC QN AUTO: 29.9 PG (ref 24–34)
MCHC RBC AUTO-ENTMCNC: 33.9 G/DL (ref 31–37)
MCV RBC AUTO: 88.1 FL (ref 74–97)
MONOCYTES # BLD: 0.8 K/UL (ref 0.05–1.2)
MONOCYTES NFR BLD: 7 % (ref 3–10)
NEUTS SEG # BLD: 6.8 K/UL (ref 1.8–8)
NEUTS SEG NFR BLD: 62 % (ref 40–73)
PLATELET # BLD AUTO: 144 K/UL (ref 135–420)
PMV BLD AUTO: 10.5 FL (ref 9.2–11.8)
POTASSIUM SERPL-SCNC: 3.5 MMOL/L (ref 3.5–5.5)
PROT SERPL-MCNC: 5.9 G/DL (ref 6.4–8.2)
RBC # BLD AUTO: 3.11 M/UL (ref 4.7–5.5)
SODIUM SERPL-SCNC: 137 MMOL/L (ref 136–145)
VIT B12 SERPL-MCNC: 274 PG/ML (ref 211–911)
WBC # BLD AUTO: 11 K/UL (ref 4.6–13.2)

## 2019-07-23 PROCEDURE — 85025 COMPLETE CBC W/AUTO DIFF WBC: CPT

## 2019-07-23 PROCEDURE — 65270000029 HC RM PRIVATE

## 2019-07-23 PROCEDURE — 97116 GAIT TRAINING THERAPY: CPT

## 2019-07-23 PROCEDURE — 74011250637 HC RX REV CODE- 250/637: Performed by: ORTHOPAEDIC SURGERY

## 2019-07-23 PROCEDURE — 74011250636 HC RX REV CODE- 250/636: Performed by: ORTHOPAEDIC SURGERY

## 2019-07-23 PROCEDURE — 36415 COLL VENOUS BLD VENIPUNCTURE: CPT

## 2019-07-23 PROCEDURE — 80053 COMPREHEN METABOLIC PANEL: CPT

## 2019-07-23 PROCEDURE — 82746 ASSAY OF FOLIC ACID SERUM: CPT

## 2019-07-23 PROCEDURE — 97535 SELF CARE MNGMENT TRAINING: CPT

## 2019-07-23 PROCEDURE — 82607 VITAMIN B-12: CPT

## 2019-07-23 PROCEDURE — 83735 ASSAY OF MAGNESIUM: CPT

## 2019-07-23 PROCEDURE — 74011250637 HC RX REV CODE- 250/637: Performed by: FAMILY MEDICINE

## 2019-07-23 RX ORDER — LANOLIN ALCOHOL/MO/W.PET/CERES
325 CREAM (GRAM) TOPICAL
Qty: 30 TAB | Refills: 0 | Status: SHIPPED | OUTPATIENT
Start: 2019-07-23

## 2019-07-23 RX ORDER — DOCUSATE SODIUM 100 MG/1
100 CAPSULE, LIQUID FILLED ORAL 2 TIMES DAILY
Qty: 60 CAP | Refills: 2 | Status: SHIPPED | OUTPATIENT
Start: 2019-07-23 | End: 2019-10-21

## 2019-07-23 RX ORDER — POTASSIUM CHLORIDE 1.5 G/1.77G
20 POWDER, FOR SOLUTION ORAL
Status: COMPLETED | OUTPATIENT
Start: 2019-07-23 | End: 2019-07-23

## 2019-07-23 RX ORDER — LANOLIN ALCOHOL/MO/W.PET/CERES
1000 CREAM (GRAM) TOPICAL DAILY
Qty: 30 TAB | Refills: 0 | Status: SHIPPED | OUTPATIENT
Start: 2019-07-23

## 2019-07-23 RX ORDER — LANOLIN ALCOHOL/MO/W.PET/CERES
1 CREAM (GRAM) TOPICAL
Status: DISCONTINUED | OUTPATIENT
Start: 2019-07-23 | End: 2019-07-26 | Stop reason: HOSPADM

## 2019-07-23 RX ORDER — FACIAL-BODY WIPES
10 EACH TOPICAL
Qty: 10 EACH | Refills: 0 | Status: SHIPPED | OUTPATIENT
Start: 2019-07-23 | End: 2022-02-04

## 2019-07-23 RX ORDER — DEXTROMETHORPHAN HYDROBROMIDE, GUAIFENESIN 5; 100 MG/5ML; MG/5ML
650 LIQUID ORAL
Qty: 90 TAB | Refills: 1 | Status: SHIPPED | OUTPATIENT
Start: 2019-07-23 | End: 2022-02-04

## 2019-07-23 RX ORDER — FAMOTIDINE 20 MG/1
20 TABLET, FILM COATED ORAL 2 TIMES DAILY
Qty: 60 TAB | Refills: 0 | Status: SHIPPED | OUTPATIENT
Start: 2019-07-23

## 2019-07-23 RX ORDER — LANOLIN ALCOHOL/MO/W.PET/CERES
1000 CREAM (GRAM) TOPICAL DAILY
Status: DISCONTINUED | OUTPATIENT
Start: 2019-07-23 | End: 2019-07-26 | Stop reason: HOSPADM

## 2019-07-23 RX ADMIN — POTASSIUM CHLORIDE 20 MEQ: 1.5 POWDER, FOR SOLUTION ORAL at 09:45

## 2019-07-23 RX ADMIN — FERROUS SULFATE TAB 325 MG (65 MG ELEMENTAL FE) 325 MG: 325 (65 FE) TAB at 09:45

## 2019-07-23 RX ADMIN — DOCUSATE SODIUM 100 MG: 100 CAPSULE, LIQUID FILLED ORAL at 17:13

## 2019-07-23 RX ADMIN — CARBAMAZEPINE 400 MG: 200 TABLET, EXTENDED RELEASE ORAL at 09:45

## 2019-07-23 RX ADMIN — FAMOTIDINE 20 MG: 20 TABLET ORAL at 09:45

## 2019-07-23 RX ADMIN — FAMOTIDINE 20 MG: 20 TABLET ORAL at 17:13

## 2019-07-23 RX ADMIN — OXYCODONE HYDROCHLORIDE 5 MG: 5 TABLET ORAL at 02:50

## 2019-07-23 RX ADMIN — OXYCODONE HYDROCHLORIDE 5 MG: 5 TABLET ORAL at 19:59

## 2019-07-23 RX ADMIN — DOCUSATE SODIUM 100 MG: 100 CAPSULE, LIQUID FILLED ORAL at 09:45

## 2019-07-23 RX ADMIN — NALOXEGOL OXALATE 25 MG: 25 TABLET, FILM COATED ORAL at 06:57

## 2019-07-23 RX ADMIN — LATANOPROST 1 DROP: 50 SOLUTION OPHTHALMIC at 21:39

## 2019-07-23 RX ADMIN — OXYCODONE HYDROCHLORIDE 5 MG: 5 TABLET ORAL at 12:26

## 2019-07-23 RX ADMIN — CYANOCOBALAMIN TAB 1000 MCG 1000 MCG: 1000 TAB at 09:45

## 2019-07-23 RX ADMIN — CARBAMAZEPINE 400 MG: 200 TABLET, EXTENDED RELEASE ORAL at 21:00

## 2019-07-23 RX ADMIN — PRAVASTATIN SODIUM 40 MG: 20 TABLET ORAL at 21:39

## 2019-07-23 RX ADMIN — ENOXAPARIN SODIUM 40 MG: 40 INJECTION SUBCUTANEOUS at 09:45

## 2019-07-23 NOTE — PROGRESS NOTES
Bedside shift change report given to Qi Weldon (oncoming nurse) by Jonatan Hoover (offgoing nurse). Report included the following information SBAR, Kardex, Intake/Output and MAR.

## 2019-07-23 NOTE — ROUTINE PROCESS
Bedside and Verbal shift change report given to Daniel [address] (oncoming nurse) by Rosie Shirley RN (offgoing nurse). Report included the following information SBAR, Kardex, Procedure Summary, Intake/Output, MAR and Recent Results.

## 2019-07-23 NOTE — PROGRESS NOTES
Problem: Mobility Impaired (Adult and Pediatric)  Goal: *Acute Goals and Plan of Care (Insert Text)  Description  Physical Therapy Goals  Initiated 7/22/2019 and to be accomplished within 7 day(s)  1. Patient will move from supine to sit and sit to supine , scoot up and down and roll side to side in bed with minimal assistance/contact guard assist.     2.  Patient will transfer from bed to chair and chair to bed with minimal assistance/contact guard assist using the least restrictive device. 3.  Patient will perform sit to stand with minimal assistance/contact guard assist.  4.  Patient will ambulate with contact guard assist for 75 feet with the least restrictive device. Prior Level of Function: Independent with mobility including gait no AD. Pt lives alone in a 1st level apartment no steps to enter. Pt has a cane at home as needed. Outcome: Progressing Towards Goal   PHYSICAL THERAPY TREATMENT    Patient: Bernadette Turner (64 y.o. male)  Date: 7/23/2019  Diagnosis: Femur fracture, right ([facility]) [S72.91XA] <principal problem not specified>  Procedure(s) (LRB):  FEMUR INSERTION INTRA MEDULLARY NAIL RIGHT (Right) 2 Days Post-Op  Precautions: Fall, WBAT(RLE)    ASSESSMENT:  Pt found supine HOB elevated willing to participate w/ therapy. Tx performed w/ OT to maximize safety of pt and staff. Pt able to mobilize well this visit displaying improvements in confidence w/ WBing and mobility this visit. Pt req less assistance t/o (see below), however, 2/2 to imbalance, gait deviations, and fear, pt continues to req assistance and cueing t/o. Pt displayed great improvements w/ decrease in pain w/ AROM when doffing LE to EOB and scooting to EOB. W/ cueing for proper alignment, pt stood to EOB to RW, req min A for balance correction, and additional time and cueing to increase comfort and trust in right LE. Pt able to progress well this visit in amb w/ good carry over for cueing to lead w/ right LE.  Initially, pt was displaying excessive right step length w/ improper VAUGHN w/ RW creating post lean. W/ cueing, pt improved balance/stability and cont for 60'. Pt returned to room to recliner, provided w/ education, and left in room w/ all needs in reach w/ feet reclined. Instructed pt to ask for assistance to return to bed when ready, voiced understanding. Nurse notified of recommendations. From a PT standpoint, as pt cont to make great functional gains w/ mobility, is becoming for more confident, and is highly motivated to improve status back to independent PLOF, pt would most benefit from inpatient rehab. Progression toward goals: good   ? Improving appropriately and progressing toward goals  ? Improving slowly and progressing toward goals  ? Not making progress toward goals and plan of care will be adjusted     PLAN:  Patient continues to benefit from skilled intervention to address the above impairments. Continue treatment per established plan of care. Discharge Recommendations:  Inpatient Rehab  Further Equipment Recommendations for Discharge:  rolling [place]er[place] [facility]     SUBJECTIVE:   Patient stated I don't like to ask for help, I don't want to be a burden.  \"I'm not used to this, I'm used to doing everything myself. \"    OBJECTIVE DATA SUMMARY:   Critical Behavior:  Neurologic State: Alert  Orientation Level: Oriented X4  Cognition: Follows commands, Poor safety awareness  Safety/Judgement: Fall prevention  Functional Mobility Training:  Bed Mobility:  Rolling: Stand-by assistance  Supine to Sit: Stand-by assistance  Transfers:  Sit to Stand: Minimum assistance  Stand to Sit: Minimum assistance  Balance:  Sitting: Intact  Sitting - Static: Good (unsupported)  Sitting - Dynamic: Fair (occasional)  Standing: Impaired; With support  Standing - Static: Fair  Standing - Dynamic : Fair  Ambulation/Gait Training:  Distance (ft): 60 Feet (ft)  Assistive Device: Walker, rolling  Ambulation - Level of Assistance: Minimal assistance  Gait Abnormalities: Antalgic;Decreased step clearance; Step to gait  Right Side Weight Bearing: As tolerated  Base of Support: Center of gravity altered;Shift to left  Stance: Right decreased  Speed/Delores: Pace decreased (<100 feet/min)  Step Length: Left lengthened;Right shortened      Pain:  Pain number not verbalized, increased w/ WBing, improved to 0/10 at end of tx in recliner, prefers to sit in recliner    Activity Tolerance:   Good   Please refer to the flowsheet for vital signs taken during this treatment. After treatment:   ? Patient left in no apparent distress sitting up in chair  ? Patient left in no apparent distress in bed  ? Call bell left within reach  ? Nursing notified  ? Caregiver present  ? Bed alarm activated  ? SCDs applied      COMMUNICATION/EDUCATION:   ?         Role of Physical Therapy in the acute care setting. ?         Fall prevention education was provided and the patient/caregiver indicated understanding. ? Patient/family have participated as able in working toward goals and plan of care. ?         Patient/family agree to work toward stated goals and plan of care. ?         Patient understands intent and goals of therapy, but is neutral about his/her participation. ? Patient is unable to participate in stated goals/plan of care: ongoing with therapy staff.   ?         Other:        Sheila Vo, PTA   Time Calculation: 32 mins

## 2019-07-23 NOTE — ROUTINE PROCESS
2339 Temp=100.4 ,encouraged ICS, cough and deep breathing exercises , tylenol 650 mg given will continues to monitor.

## 2019-07-23 NOTE — PROGRESS NOTES
[facility]    Progress Note      Patient: Fabrizio Garcia               Sex: male          DOA: 7/20/2019         YOB: 1950      Age:  77 y.o.        LOS:  LOS: 3 days         S/P  Procedure(s): FEMUR INSERTION INTRA MEDULLARY NAIL RIGHT               Subjective:     No complaints Tolerating diet Ambulating Voiding q shift. He is doing well this morning. He reports a little discomfort of the hip. His pain is managed on current medications. He has participated in PT. Denies cp, sob, abd pain   Objective:      Blood pressure 115/69, pulse 82, temperature 98.5 °F (36.9 °C), resp. rate 14, height 5' 9\" (1.753 m), weight 139 lb (63 kg), SpO2 98 %. Well developed, Well Nourished alert, cooperative, no distress  Incision clean, dry, no drainage, dressing in place  Minimal swelling and tenderness noted in right lower extremity  Sensory is intact   Motor is intact  nv intact  2+distal pulses  Neg calf tenderness  Neg for facial asymmetry     Labs:  CBC  @  CBC:   Lab Results   Component Value Date/Time    WBC 11.0 07/23/2019 04:45 AM    RBC 3.11 (L) 07/23/2019 04:45 AM    HGB 9.3 (L) 07/23/2019 04:45 AM    HCT 27.4 (L) 07/23/2019 04:45 AM    PLATELET 480 68/31/8982 04:45 AM     CMP:   Lab Results   Component Value Date/Time    Glucose 104 (H) 07/23/2019 04:45 AM    Sodium 137 07/23/2019 04:45 AM    Potassium 3.5 07/23/2019 04:45 AM    Chloride 104 07/23/2019 04:45 AM    CO2 28 07/23/2019 04:45 AM    BUN 13 07/23/2019 04:45 AM    Creatinine 0.85 07/23/2019 04:45 AM    Calcium 8.2 (L) 07/23/2019 04:45 AM    Anion gap 5 07/23/2019 04:45 AM    BUN/Creatinine ratio 15 07/23/2019 04:45 AM    Alk.  phosphatase 63 07/23/2019 04:45 AM    Protein, total 5.9 (L) 07/23/2019 04:45 AM    Albumin 2.8 (L) 07/23/2019 04:45 AM    Globulin 3.1 07/23/2019 04:45 AM    A-G Ratio 0.9 07/23/2019 04:45 AM   @  Coagulation  Lab Results   Component Value Date    INR 1.1 07/20/2019      Basic Metabolic Profile  Lab Results   Component Value Date     07/23/2019    CO2 28 07/23/2019    BUN 13 07/23/2019       Medications Reviewed      Assessment/Plan     Active Problems:    Epilepsy ([facility] [address] (7/12/2017)      Congenital hemiparesis ([address] (7/12/2017)      Femur fracture, right ([address] (7/20/2019)      Closed displaced intertrochanteric fracture of right femur ([address] (7/21/2019)        Procedure(s): FEMUR INSERTION INTRA MEDULLARY NAIL RIGHT :      1. PT and/or OT Consults: YES continue PT/OT: oob to chair, gentle rom, WBAT RLE  2. Incision Care:  Routine Incision Care and Dressing Changes as necessary: dressing changes POD#2 and as needed  3. Pain control  4. DVT Prophylaxis - SCD in place, Lovenox 4 weeks post op in chart for discharge. 5. Acute blood loss anemia - will continue to monitor.     DISCHARGE PLANNING      Intervention : Misha Simmons ([facility]) recommended by PT for safety.  [id number] Shanta Wagner for discharge from [address] [place] [address] and [facility]  [phone]

## 2019-07-23 NOTE — PROGRESS NOTES
Progress Note    Patient: Delmy Moss MRN: [id number]  CSN: [id number]    YOB: 1950  Age: 77 y.o. Sex: male    DOA: 7/20/2019 LOS:  LOS: 3 days                    Subjective:   POD #3 IM nail R femur. Patient resting comfortably in bed upon arrival. States his pain is tolerable. No concerns. Discussed with patient benefit of inpatient rehab and plan to consult for qualification, to which patient nodded. Otherwise patient requires SNF placement as discussed with patient yesterday. Chief Complaint:   Chief Complaint   Patient presents with    Fall    Hip Pain       Review of systems  General: Denies fevers or chills. Cardiovascular: Denies chest pain or pressure, palpitations. Pulmonary: Denies shortness of breath, cough or wheeze. Gastrointestinal: Denies abdominal pain, nausea, vomiting or diarrhea. Tolerating PO diet. Genitourinary: Denies urinary frequency, urgency, hesitancy or dysuria. Musculoskeletal: Positive for weakness and pain present in R hip. Neurologic: H/o seizure. Objective:     Physical Exam:  Visit Vitals  /69 (BP 1 Location: Left arm, BP Patient Position: At rest)   Pulse 82   Temp 98.5 °F (36.9 °C)   Resp 14   Ht 5' 9\" (1.753 m)   Wt 63 kg (139 lb)   SpO2 98%   BMI 20.53 kg/m²        General:         Alert, cooperative, no acute distress    HEENT: NC, Atraumatic. Anicteric sclerae. Lungs: Chest expansion symmetric. CTAB. No wheezing, rhonchi, or rales. Mild decrease in volume of inspiration. Heart:  Regular rate and rhythm.  No murmur, rubs or gallops. No dependent edema. Abdomen: Soft, non distended, non tender.  Bowel sounds present. Extremities: Dressing to R hip clean, dry and intact. No ecchymosis or swelling. ROM intact bilateral upper and lower extremities with weakness present RLE. Pulses 2+ bilateral radial and dorsalis pedis. Psych:   Not anxious or agitated. Neurologic:  Alert and oriented X 3. No acute neurological deficits. Patient able to follow commands. Intake and Output:  Current Shift:  No intake/output data recorded. Last three shifts:  07/21 1901 - 07/23 0700  In: 545 [P.O.:545]  Out: 975 [Urine:975]    Recent Results (from the past 24 hour(s))   CBC WITH AUTOMATED DIFF    Collection Time: 07/23/19  4:45 AM   Result Value Ref Range    WBC 11.0 4.6 - 13.2 K/uL    RBC 3.11 (L) 4.70 - 5.50 M/uL    HGB 9.3 (L) 13.0 - 16.0 g/dL    HCT 27.4 (L) 36.0 - 48.0 %    MCV 88.1 74.0 - 97.0 FL    MCH 29.9 24.0 - 34.0 PG    MCHC 33.9 31.0 - 37.0 g/dL    RDW 12.4 11.6 - 14.5 %    PLATELET 868 962 - 610 K/uL    MPV 10.5 9.2 - 11.8 FL    NEUTROPHILS 62 40 - 73 %    LYMPHOCYTES 31 21 - 52 %    MONOCYTES 7 3 - 10 %    EOSINOPHILS 0 0 - 5 %    BASOPHILS 0 0 - 2 %    ABS. NEUTROPHILS 6.8 1.8 - 8.0 K/UL    ABS. LYMPHOCYTES 3.4 0.9 - 3.6 K/UL    ABS. MONOCYTES 0.8 0.05 - 1.2 K/UL    ABS. EOSINOPHILS 0.0 0.0 - 0.4 K/UL    ABS. BASOPHILS 0.0 0.0 - 0.1 K/UL    DF AUTOMATED     METABOLIC PANEL, COMPREHENSIVE    Collection Time: 07/23/19  4:45 AM   Result Value Ref Range    Sodium 137 136 - 145 mmol/L    Potassium 3.5 3.5 - 5.5 mmol/L    Chloride 104 100 - 111 mmol/L    CO2 28 21 - 32 mmol/L    Anion gap 5 3.0 - 18 mmol/L    Glucose 104 (H) 74 - 99 mg/dL    BUN 13 7.0 - 18 MG/DL    Creatinine 0.85 0.6 - 1.3 MG/DL    BUN/Creatinine ratio 15 12 - 20      GFR est AA >60 >60 ml/min/1.73m2    GFR est non-AA >60 >60 ml/min/1.73m2    Calcium 8.2 (L) 8.5 - 10.1 MG/DL    Bilirubin, total 0.4 0.2 - 1.0 MG/DL    ALT (SGPT) 14 (L) 16 - 61 U/L    AST (SGOT) 24 10 - 38 U/L    Alk.  phosphatase 63 45 - 117 U/L    Protein, total 5.9 (L) 6.4 - 8.2 g/dL    Albumin 2.8 (L) 3.4 - 5.0 g/dL    Globulin 3.1 2.0 - 4.0 g/dL    A-G Ratio 0.9 0.8 - 1.7     MAGNESIUM    Collection Time: 07/23/19  4:45 AM   Result Value Ref Range    Magnesium 1.8 1.6 - 2.6 mg/dL   VITAMIN B12    Collection Time: 07/23/19  4:45 AM   Result Value Ref Range    Vitamin B12 274 211 - 911 pg/mL   FOLATE Collection Time: 07/23/19  4:45 AM   Result Value Ref Range    Folate 19.7 (H) 3.10 - 17.50 ng/mL   Hemoglobin A1c - 5.2 (7/22/2019, 0507)    Procedures/imaging: see electronic medical records for all procedures/Xrays and details which were not copied into this note but were reviewed prior to creation of Plan    Medications:   Current Facility-Administered Medications   Medication Dose Route Frequency    potassium chloride (KLOR-CON) packet for solution 20 mEq  20 mEq Oral NOW    cyanocobalamin tablet 1,000 mcg  1,000 mcg Oral DAILY    ferrous sulfate tablet 325 mg  1 Tab Oral DAILY WITH BREAKFAST    naloxegol (MOVANTIK) tablet 25 mg  25 mg Oral ACB    enoxaparin (LOVENOX) injection 40 mg  40 mg SubCUTAneous DAILY    oxyCODONE IR (ROXICODONE) tablet 5 mg  5 mg Oral Q4H PRN    acetaminophen (TYLENOL) tablet 650 mg  650 mg Oral Q4H PRN    carBAMazepine XR (TEGretol XR) tablet 400 mg  400 mg Oral Q12H    latanoprost (XALATAN) 0.005 % ophthalmic solution 1 Drop  1 Drop Both Eyes QHS    pravastatin (PRAVACHOL) tablet 40 mg  40 mg Oral QHS    docusate sodium (COLACE) capsule 100 mg  100 mg Oral BID    bisacodyl (DULCOLAX) suppository 10 mg  10 mg Rectal DAILY PRN    famotidine (PEPCID) tablet 20 mg  20 mg Oral BID       Assessment/Plan     Active Problems:    Epilepsy ([facility] [address] (7/12/2017)      Congenital hemiparesis ([facility] [address] (7/12/2017)      Femur fracture, right (HCC) (7/20/2019)      Closed displaced intertrochanteric fracture of right femur ([facility] [address] (7/21/2019)    Plan:  Femur fracture, right s/p IM nail  PT and OT consulted and recommend skilled interventions. Consult Inpatient Rehab to verify qualification for placement, otherwise continue with placement in SNF. Patient tolerating PO Oxycodone for pain. Discontinue IV Dilaudid pt not requiring use >48hrs. Constipation  Continue to encourage patient to eat and drink. Continue docusate, Colace and Movantik as scheduled.   Dulcolax prn    Anemia - mixed anemia blood loss, iron deficiency and b12 deficiency  Lab results not included above - Iron 17, TIBC 201, Iron % saturation 8. Order scheduled cyanocobalamin and ferrous sulfate supplementation. Continue to monitor Hgb level. Hypokalemia  Order potassium chloride 20 mEq x1 dose. History of seizures  Continue tegretol    Continue DVT prophylaxis with Lovenox and SCDs. Continue GI prophylaxis with Pepcid. Jose Vizcarra  7/23/2019 9:16 AM        Patient seen and examined independently. Reviewed PA student note and changes made where appropriate.   Agree with assessment and plan    Signed By: Rufus Nagel MD     July 23, 2019     5502

## 2019-07-23 NOTE — PROGRESS NOTES
Mobility Intervention:       [] Pt dangled at edge of bed    [] Pt assisted OOB to bedside commode    [] Pt assisted OOB to chair    [] Pt ambulated to bathroom    [x] Patient was ambulated in room/hallway    Assistive Device Utilized:       [x] Rolling walker   [] Crutches   [] Straight Cane   [] Knee immobilizer   [] IV pole    After Rounding and Checking on Patient     [] Patient left in no apparent distress sitting up in chair  [x] Patient left in no apparent distress in bed  [x] Call bell left within reach  [x] SCDs on both legs & machine turned on  [x] Ice applied  [] RN notified  []  present  [] Bed alarm activated    Reason patient not mobilized:      [] Patient refused   [] Nausea/vomiting   [] Low blood pressure   [] Drowsy/lethargic    Pain Ratin         Left patient with call light, cell phone and personal belongings in reach for safety.

## 2019-07-23 NOTE — PROGRESS NOTES
Problem: Falls - Risk of  Goal: *Absence of Falls  Description  Document Richard Watson Fall Risk and appropriate interventions in the flowsheet. Outcome: Progressing Towards Goal  Note:   Fall Risk Interventions:  Mobility Interventions: Patient to call before getting OOB, PT Consult for mobility concerns         Medication Interventions: Patient to call before getting OOB, Teach patient to arise slowly    Elimination Interventions: Call light in reach, Urinal in reach    History of Falls Interventions: Investigate reason for fall, Room close to nurse's station         Problem: Patient Education: Go to Patient Education Activity  Goal: Patient/Family Education  Outcome: Progressing Towards Goal     Problem: Pain  Goal: *Control of Pain  Outcome: Progressing Towards Goal     Problem: Patient Education: Go to Patient Education Activity  Goal: Patient/Family Education  Outcome: Progressing Towards Goal     Problem: Pressure Injury - Risk of  Goal: *Prevention of pressure injury  Description  Document Venancio Scale and appropriate interventions in the flowsheet.   Outcome: Progressing Towards Goal  Note:   Pressure Injury Interventions:       Moisture Interventions: Absorbent underpads    Activity Interventions: PT/OT evaluation    Mobility Interventions: PT/OT evaluation    Nutrition Interventions: Discuss nutritional consult with provider                     Problem: Patient Education: Go to Patient Education Activity  Goal: Patient/Family Education  Outcome: Progressing Towards Goal     Problem: Infection - Risk of, Surgical Site Infection  Goal: *Absence of surgical site infection signs and symptoms  Outcome: Progressing Towards Goal     Problem: Patient Education: Go to Patient Education Activity  Goal: Patient/Family Education  Outcome: Progressing Towards Goal     Problem: Patient Education: Go to Patient Education Activity  Goal: Patient/Family Education  Outcome: Progressing Towards Goal     Problem: Patient Education: Go to Patient Education Activity  Goal: Patient/Family Education  Outcome: Progressing Towards Goal

## 2019-07-23 NOTE — DISCHARGE SUMMARY
Discharge Summary        Patient ID:        Noni Brunson        [id number]        53 y.o.        1950        Admission Date: 7/20/2019      Discharge date and time: 7/25/2019        Inpatient care:   Problem that led to hospitalization: Active Problems:    Epilepsy ([address] (7/12/2017)      Congenital hemiparesis ([address] (7/12/2017)      Femur fracture, right ([address] (7/20/2019)      Closed displaced intertrochanteric fracture of right femur ([address] (7/21/2019)      B12 deficiency (7/24/2019)       Key findings and test results:     7/20/19 pCXR  IMPRESSION: Hyperinflation with no acute pulmonary disease. 7/20/19 XR R Hip  Osteoporosis     Severely comminuted intertrochanteric fracture with almost 90 degree angulation. Slightly displaced greater trochanteric and lesser trochanteric fragments. No  dislocation. Pubic symphysis is intact. No displaced pubic ramal fractures     IMPRESSION  IMPRESSION: Right femoral neck fracture    7/21/19 Xr R Hip  FINDINGS:     Interval surgery with intramedullary nail affixed distally by means of a screw,  with a sliding type femoral head/neck screw proximally, traversing the site of  previously described fracture. Interval reduction of previously described  angulated proximal femoral fracture.     IMPRESSION  Impression:      Postoperative appearance of the right hip as above.                   Consults:Orthopedic Surgery    Procedures:   7/21/19 Right hip open reduction and internal fixation with cephalomedullary nail per Ortho, Dr. Felicitas Fernandes               Final diagnoses (primary and secondary): <principal problem not specified>                                            Active Problems:    Epilepsy ([address] (7/12/2017)      Congenital hemiparesis ([address] (7/12/2017)      Femur fracture, right ([address] (7/20/2019)      Closed displaced intertrochanteric fracture of right femur ([address] (7/21/2019)      B12 deficiency (7/24/2019)            Brief hospital course  Patient admitted for right femoral neck fracture status post mechanical fall. See H&P for full HPI. Severely comminuted intertrochanteric fracture with slightly displaced greater trochanteric and lesser trochanteric fragments, status post right hip ORIF with cephalomedullary nail on 7/21/19 per Ortho Dr. Ramana Daniels. Patient is weight bearing as tolerated to RLE. PT and OT consulted. OT recommended SNF, PT recommendation for inpatient rehab. Inpatient rehab consulted, recommended pt does not meet criteria for inpatient rehab. Pt and niece agreeable to SNF for skilled therapy. Case management assist with discharge. Continue with lovenox for 28days per ortho recs. Follow up with ortho within 10 days of discharge.      Constipation  Continue Colace 100 mg BID, Movantk 25 mg daily, dulcolax if needed.     Impaired Fasting glucose  A1c 5.2 normal     Anemia - combination acute blood loss anemia in setting of recent surgery, iron deficiency and b12 deficiency  Continue ferrous sulfate and b12, monitor CBC     History of seizures  Continue tegretol                   Discharge Exam:   Visit Vitals  /81 (BP 1 Location: Left arm, BP Patient Position: At rest;Sitting)   Pulse 96   Temp 97.3 °F (36.3 °C)   Resp 17 Comment: talking   Ht 5' 9\" (1.753 m)   Wt 63 kg (139 lb)   SpO2 99%   BMI 20.53 kg/m²     General:         Alert, cooperative, no acute distress    HEENT:           NC, Atraumatic. Anicteric sclerae. Lungs:            CTAB. No Wheezing/Rhonchi/Rales. Heart:              Regular rate and rhythm.  No murmur, rubs or gallops. Abdomen:      Soft, non distended, non tender.  Bowel sounds present. Extremities:   No edema. Dressing to R lateral hip and thigh clean, dry and intact. Pulses 2+ B/L radial arteries and B/L  dorsalis pedis arteries. Full ROM in B feet. Psych:            Not anxious or agitated. Neurologic:    CN 2-12 grossly intact, Alert and oriented X 3. No acute neurological deficits.          Postdischarge care/patient self-management          Medications at discharge  including reasons for change and indications for new ones:   Current Discharge Medication List      START taking these medications    Details   bisacodyl (DULCOLAX) 10 mg suppository Insert 10 mg into rectum daily as needed for Other (if no bowel movement >48hrs). Qty: 10 Each, Refills: 0      cyanocobalamin 1,000 mcg tablet Take 1 Tab by mouth daily. Qty: 30 Tab, Refills: 0      docusate sodium (COLACE) 100 mg capsule Take 1 Cap by mouth two (2) times a day for 90 days. Qty: 60 Cap, Refills: 2      famotidine (PEPCID) 20 mg tablet Take 1 Tab by mouth two (2) times a day. Qty: 60 Tab, Refills: 0      ferrous sulfate 325 mg (65 mg iron) tablet Take 1 Tab by mouth daily (with breakfast). Qty: 30 Tab, Refills: 0      naloxegol (MOVANTIK) 25 mg tab tablet Take 1 Tab by mouth Daily (before breakfast). Qty: 30 Tab, Refills: 0      enoxaparin (LOVENOX) 40 mg/0.4 mL 0.4 mL by SubCUTAneous route daily for 28 days. Indications: Deep Vein Thrombosis Prevention  Qty: 28 Syringe, Refills: 0         CONTINUE these medications which have CHANGED    Details   acetaminophen (TYLENOL) 650 mg TbER Take 1 Tab by mouth every four (4) hours as needed for Pain or Fever. Qty: 90 Tab, Refills: 1    Associated Diagnoses: Chronic right shoulder pain         CONTINUE these medications which have NOT CHANGED    Details   latanoprost (XALATAN) 0.005 % ophthalmic solution place 1 drop into both eyes at bedtime  Qty: 2.5 mL, Refills: 1    Associated Diagnoses: Open-angle glaucoma, unspecified glaucoma stage, unspecified laterality, unspecified open-angle glaucoma type      carBAMazepine XR (TEGRETOL XR) 400 mg SR tablet take 1 tablet by mouth twice a day  Qty: 60 Tab, Refills: 11    Associated Diagnoses: Convulsions, unspecified convulsion type (HCC)      pravastatin (PRAVACHOL) 40 mg tablet Take 1 Tab by mouth nightly.   Qty: 30 Tab, Refills: 11    Associated Diagnoses: Hyperlipidemia, unspecified hyperlipidemia type         STOP taking these medications       oxyCODONE-acetaminophen (PERCOCET) 5-325 mg per tablet Comments:   Reason for Stopping:                   Pending laboratory work and tests:  None         Condition at discharge and functional status: improved        Diet at discharge Cardiac Diet        Activities at discharge: PT/OT eval and treat, out of bed to chair, gentle range of motion RLE, Weight bear as tolerated to RLE        Discharge destination: SNF    Advanced care plan    Full Code    Contact information/plan for follow up care     Follow-up with Facility physician within 48hrs   Follow-up with Dr. Ben Jaffe or Dr. King Sultana within 2 weeks of discharge  Follow-up with Orthopedics, Dr. Beth Davis or CORIE Bedoya, within 1 week after discharge for wound check and staples removal and xrays     Follow-up tests/labs:  CBC, CMP, Mg within 3 days

## 2019-07-23 NOTE — PROGRESS NOTES
Spoke with Nifrandy Lei at [phone]. Informed niece that pt has Medicare Part B and not Medicare Part A. [address] [place] [address] is able to accept pt with Part B and Medicaid. Niece verbalizes understanding and is agreeable to the transition plan. Dalton Cardona at [facility] will start insurance authorization. Will update family as needed.     Kem Contreras, RN  Care Manager  [phone]

## 2019-07-23 NOTE — PROGRESS NOTES
NUTRITION    Nutrition Screen      RECOMMENDATIONS / PLAN:     - Add nutritional supplements: Ensure Enlive TID  - Add food preferences. - Continue RD inpatient monitoring and evaluation. NUTRITION INTERVENTIONS & DIAGNOSIS:     [x] Meals/snacks: modified composition  [x] Medical food supplement therapy: intiate    Nutrition Diagnosis: Inadequate oral intake related to appetite and food preferences as evidenced by pt consuming <50% of most meals. ASSESSMENT:     Femur fracture s/p fall; open reduction and fixation on 7/21. Meal intake is usually poor, but varies based on meal preferences/dislike of food. Drinks Ensure occasionally PTA; agreeable to consuming while inpatient to optimize nutritional intake. Reviewed food preferences and tolerances 2/2 missing teeth, declines changing diet order to make food softer.      Average po intake adequate to meet patients estimated nutritional needs:   [] Yes     [x] No   [] Unable to determine at this time    Diet: DIET CARDIAC Regular      Food Allergies: NKFA  Current Appetite:   [] Good     [x] Fair     [] Poor     [] Other:  Appetite/meal intake prior to admission:   [] Good     [x] Fair     [] Poor     [] Other:  Feeding Limitations:  [] Swallowing difficulty    [x] Chewing difficulty: missing teeth    [] Other:  Current Meal Intake: Patient Vitals for the past 100 hrs:   % Diet Eaten   07/23/19 0828 0 %   07/22/19 1159 25 %   07/22/19 0833 50 %   07/21/19 1929 25 %   07/21/19 1518 25 %   07/21/19 0953 0 %   07/20/19 1858 0 %       BM: 7/19  Skin Integrity: surgical incision to right leg  Edema:   [x] No     [] Yes   Pertinent Medications: Reviewed: dulcolax, cyanocobalamin, pepcid, colace, ferous sulfate    Recent Labs     07/23/19  0445 07/22/19  0507 07/21/19  0522    137 140   K 3.5 3.6 4.5    105 106   CO2 28 27 30   * 129* 107*   BUN 13 10 15   CREA 0.85 0.85 0.92   CA 8.2* 8.5 8.8   MG 1.8 1.8 1.9   ALB 2.8* 3.3* 3.6   SGOT 24 22 19 ALT 14* 16 21       Intake/Output Summary (Last 24 hours) at 7/23/2019 1155  Last data filed at 7/23/2019 6114  Gross per 24 hour   Intake 0 ml   Output 500 ml   Net -500 ml       Anthropometrics:  Ht Readings from Last 1 Encounters:   07/21/19 5' 9\" (1.753 m)     Last 3 Recorded Weights in this Encounter    07/21/19 1327   Weight: 63 kg (139 lb)     Body mass index is 20.53 kg/m². Weight History:   Weight Metrics 7/21/2019 1/30/2019 7/12/2017 3/23/2017 3/6/2017 7/1/2016 3/16/2015   Weight 139 lb 131 lb 124 lb 132 lb 132 lb 123 lb 132 lb 6.4 oz   BMI 20.53 kg/m2 19.35 kg/m2 18.31 kg/m2 19.49 kg/m2 20.67 kg/m2 19.26 kg/m2 20.73 kg/m2        Admitting Diagnosis: Femur fracture, right (HCC) [S72.91XA]  Pertinent PMHx: hyperlipidemia    Education Needs:        [x] None identified  [] Identified - Not appropriate at this time  []  Identified and addressed - refer to education log  Learning Limitations:   [x] None identified  [] Identified    Cultural, Anabaptism & ethnic food preferences:  [x] None identified    [] Identified and addressed     ESTIMATED NUTRITION NEEDS:     Calories: 9355-5625 kcal (MSJx1.2-1.3) based on  [x] Actual BW 63 kg     [] IBW   Protein: 50-75  gm (0.8-1.2 gm/kg) based on  [x] Actual BW      [] IBW   Fluid: 1 mL/kcal     MONITORING & EVALUATION:     Nutrition Goal(s):   1. Po intake of meals will meet >75% of patient estimated nutritional needs within the next 7 days.   Outcome:  [] Met/Ongoing    [] Progressing towards goal    [] Not progressing towards goal    [x] New/Initial goal     Monitoring:   [x] Food and beverage intake   [x] Diet order   [x] Nutrition-focused physical findings   [x] Treatment/therapy   [] Weight   [] Enteral nutrition intake        Previous Recommendations (for follow-up assessments only):     []   Implemented       []   Not Implemented (RD to address)      [] No Longer Appropriate     [] No Recommendation Made     Discharge Planning: cardiac diet   [x] Participated in care planning, discharge planning, & interdisciplinary rounds as appropriate      Maureen Art RD, [address]   Pager: [phone]

## 2019-07-23 NOTE — PROGRESS NOTES
Bedside shift change report given to Hayden Lopez and Sharmila Becker (oncoming nurse) by Tequila Eden (offgoing nurse). Report included the following information SBAR, Kardex, Intake/Output and MAR.

## 2019-07-23 NOTE — PROGRESS NOTES
ARU/IPR REFERRAL CONTACT NOTE  [address] [place] [address] for Physical Rehabilitation    RE: Gabriel Gomez      Thank you for the opportunity to review this patient's case for admission to [address] [place] [address] for Physical Rehabilitation. Based on our pre-admission screening:     [x ] This patient does not meet criteria for admission to [facility] for Physical        Rehabilitation due to:    [x ] Documents do not reflect active medical necessity requiring close Physician involvement and Rehabilitation Nursing. Again, Thank you for this referral. Should you have any questions please do not hesitate to call. Sincerely,  Audrey Hall. Angie Bruno, [address]  Angie Bruno, CUONG  Admissions [facility] for Physical Rehabilitation  [phone]

## 2019-07-23 NOTE — PROGRESS NOTES
Rounded on Braxton Caldwell to provide educational material on osteoporosis. Educated the patient about the importance of following up their PCP to discuss being screened for osteoporosis since the are at high risk to refracture. Discussed the importance of taking Vitamin D and Calcium daily under the supervision of their PCP. Also discussed making sure to get home environment set up for safety to prevent future falls which can lead to more fractures. Shared with the patient the increased risk they are at for a future fall and fracture over the next 12 months. Patient given educational material to review. Encouraged the patient to ask questions. Patient left with call light in reach. All belongings in reach and feeling safe. Patient encouraged to call for assistance and not to get up on their own. Patient verbalizing understanding.      Orthopedic

## 2019-07-23 NOTE — PROGRESS NOTES
Problem: Self Care Deficits Care Plan (Adult)  Goal: *Acute Goals and Plan of Care (Insert Text)  Description  Occupational Therapy Goals  Initiated 7/22/2019 within 7 day(s). 1.  Patient will perform functional activity/ADL task seated with independence for 3-5 minutes and G balance. 2.  Patient will perform upper body dressing standing with independence and G balance. 3.  Patient will perform lower body dressing with supervision/set-up using AE prn.  4.  Patient will perform toilet transfers with modified independence. 5.  Patient will perform all aspects of toileting with modified independence. 6.  Patient will participate in upper extremity therapeutic exercise/activities with independence for 5-7 minutes to increase BUE strength for self-care/ADLs. 7.  Patient will utilize energy conservation techniques during functional activities with verbal cues. Prior Level of Function: Patient was independent with self-care and functional mobility PTA. Pt reports having a SPC at home but does not need it for functional mobility. Outcome: Progressing Towards Goal   OCCUPATIONAL THERAPY TREATMENT    Patient: Andi Zamora (40 y.o. male)  Date: 7/23/2019  Diagnosis: Femur fracture, right (HCC) [S72.91XA] <principal problem not specified>  Procedure(s) (LRB):  FEMUR INSERTION INTRA MEDULLARY NAIL RIGHT (Right) 2 Days Post-Op  Precautions: Fall, WBAT(RLE)    Chart, occupational therapy assessment, plan of care, and goals were reviewed. ASSESSMENT:  Pt sitting up in bed and agreeable; implemented PT/OT co-tx to maximize safety as pt with poor carryover of advancing right LE. PT focused on mobility while this CLEMENTS focused on ADLs and education. Pt much improved this session with decreased assistance required during bed mobility and functional mobility; see below. Decreased endurance is pt's biggest limiting factor.  Noted pt to self-initiate PWB through right LE towards end of mobility/transfer back to bedside chair. Cues required for safe positioning and hand placement during transfer. Pt encouraged to eat banana with education provided on nutrition requirement for energy and how they relate to ADLs; pt verbalized understanding and requesting Ensure-RN notified. Pt also encouraged to utilize 3:1 commode over toilet to maximize independence with toileting vs urinal when he is able; pt verbalized understanding. All needs in reach and pt made comfortable in chair at end of session. Progression toward goals:  ?          Improving appropriately and progressing toward goals  ? Improving slowly and progressing toward goals  ? Not making progress toward goals and plan of care will be adjusted     PLAN:  Patient continues to benefit from skilled intervention to address the above impairments. Continue treatment per established plan of care. Discharge Recommendations:  Inpatient Rehab  Further Equipment Recommendations for Discharge:  3:1 commode      SUBJECTIVE:   Patient stated this food has no taste.  (re: eating breakfast)  Pt requested ensure when therapist mentioned he does need nutrition; spoke with RN for dietary consult   OBJECTIVE DATA SUMMARY:   Cognitive/Behavioral Status:  Neurologic State: Alert  Orientation Level: Oriented X4  Cognition: Follows commands, Poor safety awareness  Safety/Judgement: Fall prevention    Functional Mobility and Transfers for ADLs:  Bed Mobility:  Rolling: Stand-by assistance  Supine to Sit: Stand-by assistance     Transfers:  Sit to Stand: Minimum assistance  Stand to Sit: Minimum assistance  Toilet Transfer : Minimum assistance(simulated to bedside chair )  Bathroom Mobility: Contact guard assistance(simulated )      Balance:  Sitting: Intact  Sitting - Static: Good (unsupported)  Sitting - Dynamic: Fair (occasional)  Standing: Impaired; With support  Standing - Static: Fair  Standing - Dynamic : Fair    ADL Intervention:  Feeding  Feeding Assistance: Set-up(open banana )  Food to Mouth: Independent    Upper Body Dressing Assistance  Dressing Assistance: Stand-by assistance  Hospital Gown: Stand-by assistance(backwards )    Lower Body Dressing Assistance  Dressing Assistance: Stand-by assistance  Socks: Stand-by assistance(left sock only; pt unable to reach right foot/heel)  Leg Crossed Method Used: No  Position Performed: Seated in chair(LEs elevated with use of chair for support )    Cognitive Retraining  Safety/Judgement: Fall prevention    Pain:  Pain level pre-treatment: 0/10   Pain level post-treatment: 0/10  Pain Intervention(s): Medication (see MAR); Rest, Ice, Repositioning  Response to intervention: Nurse notified, See doc flow    Activity Tolerance:    Fair; pt tolerated approx 15 mins OOB activity     Please refer to the flowsheet for vital signs taken during this treatment. After treatment:   ?  Patient left in no apparent distress sitting up in chair  ? Patient left in no apparent distress in bed  ? Call bell left within reach  ? Nursing notified  ? Caregiver present  ? Bed alarm activated    COMMUNICATION/EDUCATION:   ? Role of Occupational Therapy in the acute care setting  ? Home safety education was provided and the patient/caregiver indicated understanding. ? Patient/family have participated as able in working towards goals and plan of care. ? Patient/family agree to work toward stated goals and plan of care. ? Patient understands intent and goals of therapy, but is neutral about his/her participation. ? Patient is unable to participate in goal setting and plan of care.       Thank you for this referral.  JADA Sosa   Time Calculation: 32 mins

## 2019-07-24 PROBLEM — E53.8 B12 DEFICIENCY: Status: ACTIVE | Noted: 2019-07-24

## 2019-07-24 LAB
ANION GAP SERPL CALC-SCNC: 4 MMOL/L (ref 3–18)
BASOPHILS # BLD: 0 K/UL (ref 0–0.1)
BASOPHILS NFR BLD: 0 % (ref 0–2)
BUN SERPL-MCNC: 9 MG/DL (ref 7–18)
BUN/CREAT SERPL: 12 (ref 12–20)
CALCIUM SERPL-MCNC: 8.3 MG/DL (ref 8.5–10.1)
CHLORIDE SERPL-SCNC: 104 MMOL/L (ref 100–111)
CO2 SERPL-SCNC: 29 MMOL/L (ref 21–32)
CREAT SERPL-MCNC: 0.74 MG/DL (ref 0.6–1.3)
DIFFERENTIAL METHOD BLD: ABNORMAL
EOSINOPHIL # BLD: 0 K/UL (ref 0–0.4)
EOSINOPHIL NFR BLD: 0 % (ref 0–5)
ERYTHROCYTE [DISTWIDTH] IN BLOOD BY AUTOMATED COUNT: 12.4 % (ref 11.6–14.5)
GLUCOSE SERPL-MCNC: 106 MG/DL (ref 74–99)
HCT VFR BLD AUTO: 26.2 % (ref 36–48)
HGB BLD-MCNC: 8.7 G/DL (ref 13–16)
LYMPHOCYTES # BLD: 2.3 K/UL (ref 0.9–3.6)
LYMPHOCYTES NFR BLD: 27 % (ref 21–52)
MCH RBC QN AUTO: 29.4 PG (ref 24–34)
MCHC RBC AUTO-ENTMCNC: 33.2 G/DL (ref 31–37)
MCV RBC AUTO: 88.5 FL (ref 74–97)
MONOCYTES # BLD: 0.6 K/UL (ref 0.05–1.2)
MONOCYTES NFR BLD: 8 % (ref 3–10)
NEUTS SEG # BLD: 5.5 K/UL (ref 1.8–8)
NEUTS SEG NFR BLD: 65 % (ref 40–73)
PLATELET # BLD AUTO: 140 K/UL (ref 135–420)
PMV BLD AUTO: 10 FL (ref 9.2–11.8)
POTASSIUM SERPL-SCNC: 3.9 MMOL/L (ref 3.5–5.5)
RBC # BLD AUTO: 2.96 M/UL (ref 4.7–5.5)
SODIUM SERPL-SCNC: 137 MMOL/L (ref 136–145)
WBC # BLD AUTO: 8.4 K/UL (ref 4.6–13.2)

## 2019-07-24 PROCEDURE — 74011250637 HC RX REV CODE- 250/637: Performed by: FAMILY MEDICINE

## 2019-07-24 PROCEDURE — 80048 BASIC METABOLIC PNL TOTAL CA: CPT

## 2019-07-24 PROCEDURE — 74011250636 HC RX REV CODE- 250/636: Performed by: ORTHOPAEDIC SURGERY

## 2019-07-24 PROCEDURE — 97110 THERAPEUTIC EXERCISES: CPT

## 2019-07-24 PROCEDURE — 74011250637 HC RX REV CODE- 250/637: Performed by: ORTHOPAEDIC SURGERY

## 2019-07-24 PROCEDURE — 97530 THERAPEUTIC ACTIVITIES: CPT

## 2019-07-24 PROCEDURE — 65270000029 HC RM PRIVATE

## 2019-07-24 PROCEDURE — 36415 COLL VENOUS BLD VENIPUNCTURE: CPT

## 2019-07-24 PROCEDURE — 97116 GAIT TRAINING THERAPY: CPT

## 2019-07-24 PROCEDURE — 85025 COMPLETE CBC W/AUTO DIFF WBC: CPT

## 2019-07-24 PROCEDURE — 97535 SELF CARE MNGMENT TRAINING: CPT

## 2019-07-24 RX ADMIN — ENOXAPARIN SODIUM 40 MG: 40 INJECTION SUBCUTANEOUS at 08:29

## 2019-07-24 RX ADMIN — OXYCODONE HYDROCHLORIDE 5 MG: 5 TABLET ORAL at 18:14

## 2019-07-24 RX ADMIN — PRAVASTATIN SODIUM 40 MG: 20 TABLET ORAL at 22:50

## 2019-07-24 RX ADMIN — DOCUSATE SODIUM 100 MG: 100 CAPSULE, LIQUID FILLED ORAL at 18:11

## 2019-07-24 RX ADMIN — DOCUSATE SODIUM 100 MG: 100 CAPSULE, LIQUID FILLED ORAL at 08:29

## 2019-07-24 RX ADMIN — FAMOTIDINE 20 MG: 20 TABLET ORAL at 18:11

## 2019-07-24 RX ADMIN — FERROUS SULFATE TAB 325 MG (65 MG ELEMENTAL FE) 325 MG: 325 (65 FE) TAB at 08:29

## 2019-07-24 RX ADMIN — NALOXEGOL OXALATE 25 MG: 25 TABLET, FILM COATED ORAL at 05:51

## 2019-07-24 RX ADMIN — CYANOCOBALAMIN TAB 1000 MCG 1000 MCG: 1000 TAB at 08:29

## 2019-07-24 RX ADMIN — CARBAMAZEPINE 400 MG: 200 TABLET, EXTENDED RELEASE ORAL at 08:29

## 2019-07-24 RX ADMIN — FAMOTIDINE 20 MG: 20 TABLET ORAL at 08:29

## 2019-07-24 RX ADMIN — OXYCODONE HYDROCHLORIDE 5 MG: 5 TABLET ORAL at 08:29

## 2019-07-24 RX ADMIN — OXYCODONE HYDROCHLORIDE 5 MG: 5 TABLET ORAL at 02:34

## 2019-07-24 RX ADMIN — CARBAMAZEPINE 400 MG: 200 TABLET, EXTENDED RELEASE ORAL at 22:50

## 2019-07-24 RX ADMIN — LATANOPROST 1 DROP: 50 SOLUTION OPHTHALMIC at 22:51

## 2019-07-24 NOTE — PROGRESS NOTES
Spoke with Diana Lovell at [facility]. Insurance is still pending. Diana Lovell also stated that Dr. René Britt is aware that insurance is still pending despite the pt's hospital D/C order.       Sheyla Wilkins, RN  Care Manager  [phone]

## 2019-07-24 NOTE — PROGRESS NOTES
Problem: Mobility Impaired (Adult and Pediatric)  Goal: *Acute Goals and Plan of Care (Insert Text)  Description  Physical Therapy Goals  Initiated 7/22/2019 and to be accomplished within 7 day(s)  1. Patient will move from supine to sit and sit to supine , scoot up and down and roll side to side in bed with minimal assistance/contact guard assist.     2.  Patient will transfer from bed to chair and chair to bed with minimal assistance/contact guard assist using the least restrictive device. 3.  Patient will perform sit to stand with minimal assistance/contact guard assist.  4.  Patient will ambulate with contact guard assist for 75 feet with the least restrictive device. Prior Level of Function: Independent with mobility including gait no AD. Pt lives alone in a 1st level apartment no steps to enter. Pt has a cane at home as needed. Outcome: Progressing Towards Goal   PHYSICAL THERAPY TREATMENT    Patient: Kristian Strickland (40 y.o. male)  Date: 7/24/2019  Diagnosis: Femur fracture, right (HCC) [S72.91XA] <principal problem not specified>  Procedure(s) (LRB):  FEMUR INSERTION INTRA MEDULLARY NAIL RIGHT (Right) 3 Days Post-Op  Precautions: Fall, WBAT(RLE)   Chart, physical therapy assessment, plan of care and goals were reviewed. ASSESSMENT:  Pt founf resting in chair with C/o moderate pain. Pt is agreeable to exercise and mobility training. Exercises fairly performed,  but limited by R sided dysfunction. Pt shifts heavily toward L side for ambulation, but mostly during Right stance. Singular LOB episode with right directional change. Placement still suggested as pt is not capable to caring for himself at this time. Progression toward goals:  ?      Improving appropriately and progressing toward goals  ? Improving slowly and progressing toward goals  ?       Not making progress toward goals and plan of care will be adjusted     PLAN:  Patient continues to benefit from skilled intervention to address the above impairments. Continue treatment per established plan of care. Discharge Recommendations:  Skilled Nursing Facility  Further Equipment Recommendations for Discharge:  gait belt and rolling walker     SUBJECTIVE:   Patient stated I vote for a 10.  (Speaking of pain level)    OBJECTIVE DATA SUMMARY:   Critical Behavior:  Neurologic State: Alert  Orientation Level: Oriented X4  Cognition: Follows commands  Safety/Judgement: Fall prevention  Functional Mobility Training:  Transfers:  Sit to Stand: Contact guard assistance;Minimum assistance  Stand to Sit: Stand-by assistance;Contact guard assistance  Stand Pivot Transfers: Contact guard assistance  Balance:  Sitting: Intact  Standing: Impaired; With support  Standing - Static: Fair  Standing - Dynamic : Fair  Ambulation/Gait Training:  Distance (ft): 50 Feet (ft)  Assistive Device: Gait belt;Walker, rolling  Ambulation - Level of Assistance: Contact guard assistance; Moderate assistance  Gait Abnormalities: Antalgic;Decreased step clearance; Step to gait  Right Side Weight Bearing: As tolerated  Base of Support: Shift to left  Speed/Delores: Slow  Step Length: Right shortened;Left shortened  Therapeutic Exercises:       EXERCISE   Sets   Reps   Active Active Assist   Passive Self ROM   Comments   Ankle Pumps 1 30  ? ? ? ? Poor EV   Quad Sets/Glut Sets 1 15 ? ? ? ? Hamstring Sets   ? ? ? ? Short Arc Quads 1 10 ? ? ? ? Heel Slides 1 15 ? ? ? ? Straight Leg Raises   ? ? ? ? Hip Abd/Add 1 10 ? ? ? ? Poor ABD   Long Arc Quads 1 10 ? ? ? ? Seated Marching 1 10 ? ? ? ? Standing Marching   ? ? ? ?       ? ? ? ? Pain:  Pain Scale 1: Numeric (0 - 10)  Pain Intensity 1: 0  Pain Location 1: Leg  Pain Orientation 1: Right  Pain Description 1: Aching  Pain Intervention(s) 1: Medication (see MAR)  Activity Tolerance:   Fair  Please refer to the flowsheet for vital signs taken during this treatment.   After treatment:   ? Patient left in no apparent distress sitting up in chair  ? Patient left in no apparent distress in bed  ? Call bell left within reach  ? Nursing notified  ? Caregiver present  ?  Bed alarm activated      Rosalva Daniels, VESTA   Time Calculation: 40 mins

## 2019-07-24 NOTE — PROGRESS NOTES
Problem: Falls - Risk of  Goal: *Absence of Falls  Description  Document Hailey Betancourt Fall Risk and appropriate interventions in the flowsheet.   Outcome: Resolved/Met  Note:   Fall Risk Interventions:  Mobility Interventions: Assess mobility with egress test, Communicate number of staff needed for ambulation/transfer, OT consult for ADLs, Patient to call before getting OOB, PT Consult for mobility concerns         Medication Interventions: Assess postural VS orthostatic hypotension, Evaluate medications/consider consulting pharmacy, Patient to call before getting OOB, Teach patient to arise slowly    Elimination Interventions: Call light in reach, Patient to call for help with toileting needs, Toilet paper/wipes in reach, Toileting schedule/hourly rounds    History of Falls Interventions: Bed/chair exit alarm, Consult care management for discharge planning, Evaluate medications/consider consulting pharmacy

## 2019-07-24 NOTE — PROGRESS NOTES
Problem: Falls - Risk of  Goal: *Absence of Falls  Description  Document Eamon Barnes Fall Risk and appropriate interventions in the flowsheet.   Outcome: Resolved/Met  Note:   Fall Risk Interventions:  Mobility Interventions: Assess mobility with egress test, Communicate number of staff needed for ambulation/transfer, OT consult for ADLs, Patient to call before getting OOB, PT Consult for mobility concerns         Medication Interventions: Assess postural VS orthostatic hypotension, Evaluate medications/consider consulting pharmacy, Patient to call before getting OOB, Teach patient to arise slowly    Elimination Interventions: Call light in reach, Patient to call for help with toileting needs, Toilet paper/wipes in reach, Toileting schedule/hourly rounds    History of Falls Interventions: Bed/chair exit alarm, Consult care management for discharge planning, Evaluate medications/consider consulting pharmacy

## 2019-07-24 NOTE — PROGRESS NOTES
Progress Note    Patient: Esther Rubi MRN: [id number]  CSN: [id number]    YOB: 1950  Age: 77 y.o. Sex: male    DOA: 7/20/2019 LOS:  LOS: 4 days                    Subjective:   pOD #4  Pt out of bed today feeling good pain under control . Pt had BM this morning   Discussed with kayley hernandez pt accepted in [facility] pending insurance authorization. Chief Complaint:   Chief Complaint   Patient presents with    Fall    Hip Pain       Review of systems  General: Denies fevers or chills. Cardiovascular: Denies chest pain or pressure, palpitations. Pulmonary: Denies shortness of breath, cough or wheeze. Gastrointestinal: Denies abdominal pain, nausea, vomiting or diarrhea. Tolerating PO diet. Genitourinary: Denies urinary frequency, urgency, hesitancy or dysuria. Musculoskeletal: Positive for weakness and pain present in R hip. Neurologic: H/o seizure. Objective:     Physical Exam:  Visit Vitals  /65 (BP 1 Location: Left arm, BP Patient Position: At rest)   Pulse 92   Temp 98.4 °F (36.9 °C)   Resp 16   Ht 5' 9\" (1.753 m)   Wt 63 kg (139 lb)   SpO2 99%   BMI 20.53 kg/m²    General:         Alert, cooperative, no acute distress    HEENT:           NC, Atraumatic. Anicteric sclerae. Lungs:            Chest expansion symmetric. CTAB. No wheezing, rhonchi, or rales. Heart:              Regular rate and rhythm.  No murmur, rubs or gallops. No dependent edema. Abdomen:      Soft, non distended, non tender. Extremities:   Dressing to R hip clean, dry and intact. No ecchymosis or swelling. ROM intact bilateral upper and lower extremities with weakness present RLE. Pulses 2+ bilateral radial and dorsalis pedis. Psych:            Not anxious or agitated. Neurologic:    Alert and oriented X 3. No acute neurological deficits.     Intake and Output:  Current Shift:  07/24 0701 - 07/24 1900  In: 360 [P.O.:360]  Out: -   Last three shifts:  07/22 1901 - 07/24 0700  In: 1080 [P.O.:1080]  Out: 1175 [[id number]]    Labs: Results:       Chemistry Recent Labs     07/24/19 0442 07/23/19 0445 07/22/19  0507   * 104* 129*    137 137   K 3.9 3.5 3.6    104 105   CO2 29 28 27   BUN 9 13 10   CREA 0.74 0.85 0.85   CA 8.3* 8.2* 8.5   AGAP 4 5 5   BUCR 12 15 12   AP  --  63 69   TP  --  5.9* 6.0*   ALB  --  2.8* 3.3*   GLOB  --  3.1 2.7   AGRAT  --  0.9 1.2      CBC w/Diff Recent Labs     07/24/19 0442 07/23/19 0445 07/22/19  0507   WBC 8.4 11.0 11.0   RBC 2.96* 3.11* 3.48*   HGB 8.7* 9.3* 10.4*   HCT 26.2* 27.4* 30.7*    144 151   GRANS 65 62 74*   LYMPH 27 31 20*   EOS 0 0 0      Cardiac Enzymes No results for input(s): CPK, CKND1, DASHA in the last 72 hours. No lab exists for component: CKRMB, TROIP   Coagulation No results for input(s): PTP, INR, APTT in the last 72 hours. No lab exists for component: INREXT    Lipid Panel Lab Results   Component Value Date/Time    Cholesterol, total 198 07/21/2019 05:22 AM    HDL Cholesterol 108 (H) 07/21/2019 05:22 AM    LDL, calculated 76 07/21/2019 05:22 AM    VLDL, calculated 14 07/21/2019 05:22 AM    Triglyceride 70 07/21/2019 05:22 AM    CHOL/HDL Ratio 1.8 07/21/2019 05:22 AM      BNP No results for input(s): BNPP in the last 72 hours.    Liver Enzymes Recent Labs     07/23/19 0445   TP 5.9*   ALB 2.8*   AP 63   SGOT 24      Thyroid Studies Lab Results   Component Value Date/Time    TSH 0.67 07/21/2019 05:22 AM          Procedures/imaging: see electronic medical records for all procedures/Xrays and details which were not copied into this note but were reviewed prior to creation of Plan    Medications:   Current Facility-Administered Medications   Medication Dose Route Frequency    cyanocobalamin tablet 1,000 mcg  1,000 mcg Oral DAILY    ferrous sulfate tablet 325 mg  1 Tab Oral DAILY WITH BREAKFAST    naloxegol (MOVANTIK) tablet 25 mg  25 mg Oral ACB    enoxaparin (LOVENOX) injection 40 mg  40 mg SubCUTAneous DAILY  oxyCODONE IR (ROXICODONE) tablet 5 mg  5 mg Oral Q4H PRN    acetaminophen (TYLENOL) tablet 650 mg  650 mg Oral Q4H PRN    carBAMazepine XR (TEGretol XR) tablet 400 mg  400 mg Oral Q12H    latanoprost (XALATAN) 0.005 % ophthalmic solution 1 Drop  1 Drop Both Eyes QHS    pravastatin (PRAVACHOL) tablet 40 mg  40 mg Oral QHS    docusate sodium (COLACE) capsule 100 mg  100 mg Oral BID    bisacodyl (DULCOLAX) suppository 10 mg  10 mg Rectal DAILY PRN    famotidine (PEPCID) tablet 20 mg  20 mg Oral BID       Assessment/Plan     Active Problems:    Epilepsy ([facility] [address] (7/12/2017)      Congenital hemiparesis ([facility] [address] (7/12/2017)      Femur fracture, right ([facility] [address] (7/20/2019)      Closed displaced intertrochanteric fracture of right femur ([facility] [address] (7/21/2019)      B12 deficiency (7/24/2019)      Plan  Femur fracture, right s/p IM nail  PT and OT consulted and recommend skilled interventions. Consult Inpatient Rehab to verify qualification for placement, otherwise continue with placement in SNF. Patient tolerating PO Oxycodone for pain. Off dilaudid pain under control  Discussed with pt importance of incentive spirometry     Constipation  Continue to encourage patient to eat and drink. Continue docusate, Colace and Movantik as scheduled. Dulcolax prn  Pt had BM today     Anemia - mixed anemia blood loss, iron deficiency and b12 deficiency  Lab results not included above - Iron 17, TIBC 201, Iron % saturation 8. Order scheduled cyanocobalamin and ferrous sulfate supplementation. Continue to monitor Hgb level. If pt still here tomorrow will get cbc, bmp     Hypokalemia  Resolved   Continue to monitor     History of seizures  Continue tegretol     Continue DVT prophylaxis with Lovenox and SCDs. Continue GI prophylaxis with Pepcid. Discussed with case manger waiting for insurance authorization.  If pt approved by insurance today will D/C today    Christiana Mccurdy MD  7/24/2019 3:58 PM

## 2019-07-24 NOTE — PROGRESS NOTES
Bedside shift change report given to [address] rn (oncoming nurse) by Rhonda Falk (offgoing nurse). Report included the following information SBAR, Kardex, Intake/Output and MAR.

## 2019-07-24 NOTE — PROGRESS NOTES
CDMP clarification     Pt has anemia most likely acute blood loss anemia b12 in the low side  Anemia combination acute blood loss anemia in setting of recent surgery, iron deficiency and b12 deficiency

## 2019-07-24 NOTE — DISCHARGE SUMMARY
Discharge Summary     Patient: Pati Herrera MRN: [id number]  SSN: [id number]    YOB: 1950  Age: 77 y.o. Sex: male       Admit Date: 7/20/2019    Discharge Date: 7/24/2019      Admission Diagnoses: Femur fracture, right ([facility]    Discharge Diagnoses:   Problem List as of 7/24/2019 Date Reviewed: 7/12/2017          Codes Class Noted - Resolved    Closed displaced intertrochanteric fracture of right femur ([facility] ICD-10-CM: A32.949W  ICD-9-CM: 820.21  7/21/2019 - Present        Femur fracture, right ([facility] ICD-10-CM: S72.91XA  ICD-9-CM: 821.00  7/20/2019 - Present        Epilepsy ([facility] ICD-10-CM: G40.909  ICD-9-CM: 345.90  7/12/2017 - Present        Congenital hemiparesis ([facility] ICD-10-CM: G80.8  ICD-9-CM: 343.1  7/12/2017 - Present        RESOLVED: Hx of post-polio syndrome ICD-10-CM: Z86.12  ICD-9-CM: V12.02  10/2/2014 - 7/12/2017               Discharge Condition: Stable    Hospital Course: Patient admitted for right femoral neck fracture status post mechanical fall. Severely comminuted intertrochanteric fracture with slightly displaced greater trochanteric and lesser trochanteric fragments, status post right hip ORIF with cephalomedullary nail on 7/21/19 per Ortho Dr. Alexandro Pierson. Patient is weight bearing as tolerated to RLE. PT and OT consulted.  OT recommended SNF, PT recommendation for inpatient rehab. Inpatient rehab consulted, recommended pt does not meet criteria for inpatient rehab. Pt and niece agreeable to SNF for skilled therapy. Case management assist with discharge. Continue with lovenox for 28days per ortho recs.   Follow up with ortho within 10 days of discharge.      Constipation  Pt has normal BM this morning   Continue Colace 100 mg BID, Movantk 25 mg daily, dulcolax if needed.     Impaired Fasting glucose  A1c 5.2 normal     Anemia - combination acute blood loss anemia in setting of recent surgery, iron deficiency and b12 deficiency  Continue ferrous sulfate and b12, monitor CBC  If continue to have drop in H &H might need GI work up      History of seizures  Continue tegretol        Consults: Orthopedics        Physical Examination:     General:         Alert, cooperative, no acute distress    HEENT:           NC, Atraumatic. Anicteric sclerae. Lungs:            CTAB. No Wheezing/Rhonchi/Rales. Heart:              Regular rate and rhythm.  No murmur, rubs or gallops. Abdomen:      Soft, non distended, non tender.  Extremities:   No edema.  Dressing to R lateral hip and thigh clean, dry and intact. Pulses 2+ B/L radial arteries and B/L  dorsalis pedis arteries. F  Psych:            Not anxious or agitated. Neurologic:    CN 2-12 grossly intact, Alert and oriented X 3.  No acute neurological deficits. Disposition: SNF    Discharge Medications:   Current Discharge Medication List      START taking these medications    Details   bisacodyl (DULCOLAX) 10 mg suppository Insert 10 mg into rectum daily as needed for Other (if no bowel movement >48hrs). Qty: 10 Each, Refills: 0      cyanocobalamin 1,000 mcg tablet Take 1 Tab by mouth daily. Qty: 30 Tab, Refills: 0      docusate sodium (COLACE) 100 mg capsule Take 1 Cap by mouth two (2) times a day for 90 days. Qty: 60 Cap, Refills: 2      famotidine (PEPCID) 20 mg tablet Take 1 Tab by mouth two (2) times a day. Qty: 60 Tab, Refills: 0      ferrous sulfate 325 mg (65 mg iron) tablet Take 1 Tab by mouth daily (with breakfast). Qty: 30 Tab, Refills: 0      naloxegol (MOVANTIK) 25 mg tab tablet Take 1 Tab by mouth Daily (before breakfast). Qty: 30 Tab, Refills: 0      enoxaparin (LOVENOX) 40 mg/0.4 mL 0.4 mL by SubCUTAneous route daily for 28 days. Indications: Deep Vein Thrombosis Prevention  Qty: 28 Syringe, Refills: 0      oxyCODONE-acetaminophen (PERCOCET) 5-325 mg per tablet Take 1 Tab by mouth every four (4) hours as needed for Pain for up to 3 days. Max Daily Amount: 6 Tabs.   Qty: 60 Tab, Refills: 0 Associated Diagnoses: Closed displaced intertrochanteric fracture of right femur with routine healing, subsequent encounter; Post-op pain         CONTINUE these medications which have CHANGED    Details   acetaminophen (TYLENOL) 650 mg TbER Take 1 Tab by mouth every four (4) hours as needed for Pain or Fever. Qty: 90 Tab, Refills: 1    Associated Diagnoses: Chronic right shoulder pain         CONTINUE these medications which have NOT CHANGED    Details   latanoprost (XALATAN) 0.005 % ophthalmic solution place 1 drop into both eyes at bedtime  Qty: 2.5 mL, Refills: 1    Associated Diagnoses: Open-angle glaucoma, unspecified glaucoma stage, unspecified laterality, unspecified open-angle glaucoma type      carBAMazepine XR (TEGRETOL XR) 400 mg SR tablet take 1 tablet by mouth twice a day  Qty: 60 Tab, Refills: 11    Associated Diagnoses: Convulsions, unspecified convulsion type (HCC)      pravastatin (PRAVACHOL) 40 mg tablet Take 1 Tab by mouth nightly. Qty: 30 Tab, Refills: 11    Associated Diagnoses: Hyperlipidemia, unspecified hyperlipidemia type             Activity: Activity as tolerated  Diet: Cardiac Diet  Wound Care: As directed    Follow-up Appointments   Procedures    FOLLOW UP VISIT Appointment in: Ten Days In the office with Dr. Adam Villegas or CORIE Boyle     In the office with Dr. Adam Villegas or CORIE meek, Marga Schofield in 48h from discharge .  Fall precautions pt and ot treatment      Standing Status:   Standing     Number of Occurrences:   1     Order Specific Question:   Appointment in     Answer:   Ten Days     Discussed with case david pt accepted at [facility] [address] waiting for insurance authorization    Time for discharge 35 minutes    Signed By: Debbie Redd MD     July 24, 2019

## 2019-07-24 NOTE — ROUTINE PROCESS
Bedside and Verbal shift change report given by Shonda Barron RN (offgoing nurse) to Alexis Pineda RN (oncoming nurse). Report included the following information SBAR, Kardex and MAR.

## 2019-07-24 NOTE — PROGRESS NOTES
Spoke with Jose M this afternoon and still waiting for insurance authorization for SNF.     Aileen Osborne, CUONG  Care Manager  [phone]

## 2019-07-24 NOTE — ROUTINE PROCESS
Patient remains alert and oriented times three with no signs or symptoms of distress.  Dressing is clean dry and intact and pulses palpable

## 2019-07-24 NOTE — PROGRESS NOTES
Dawit Reddish rounded on post right hipRounded on Dawit Reddish to provide educational material on osteoporosis. Educated the patient about the importance of following up their PCP to discuss being screened for osteoporosis since the are at high risk to refracture. Discussed the importance of taking Vitamin D and Calcium daily under the supervision of their PCP. Also discussed making sure to get home environment set up for safety to prevent future falls which can lead to more fractures. Shared with the patient the increased risk they are at for a future fall and fracture over the next 12 months. Patient given educational material to review. Encouraged the patient to ask questions. Patient left with call light in reach. All belongings in reach and feeling safe. Patient encouraged to call for assistance and not to get up on their own. Patient verbalizing understanding. Orthopedic  fracture repair    Patient educated: Activity:  Change position in bed every hour or two to prevent skin breakdown. Walk with help to prevent blood clots & help with stiffness. Make sure not to put weight on leg until therapy or surgeon tells you. Follow you walking precautions if you have any. .  Put pillow under whole leg to help with swelling. VTE prophylaxis:   Use SCD pumps except when walking. Ankle pumps 10 times an hour at hospital & home. Take blood thinner medication as ordered by surgeon. Do not skip a dose. Pain Control:  Pain medications side effects discussed. Wean off narcotics ASAP. Use Tylenol ( 3000 mg/24 hours) , ice, distraction, moving, & change position to help with pain. Rest between activity. Don't get nauseated. Eat a snack before taking pain medication    Do not get constipated: take stool softener/mild laxative daily while on narcotics. Incentive Spirometry:    Use of incentive spirometer 10 x/hr.   Wound Care: Dressing dry and intact to right hip x 2..   Do not to take dressing off at home. Keep incision clean and dry. No lotions, powders, creams to surgical leg. .    Diet:   Eat for healing. Protein heals bone/muscle. Drink 8 glasses of water a day. Patient Safety:   Call light & belongings in reach. Call for help when want to walk or get OOB. Ask doctor about getting tested for bone health. Make sure to have your home set up for safety to prevent falls at home. Educational material given. Patient agreed to continue doing everything at home to prevent complications and have a successful recovery. Patient  verbalized understand. Given the opportunity for asking questions. Nutrition consult ordered since patient does not like the food so is not eating. He stated he would drink a nutritional shake if he could have them. Jagdeep has limited number of teeth and no dentures so diet switch to dental soft. Mobility Intervention:       [] Pt dangled at edge of bed    [] Pt assisted OOB to bedside commode    [] Pt assisted OOB to chair    [] Pt ambulated to bathroom    [] Patient was ambulated in room/hallway    Assistive Device Utilized:       [] Rolling walker   [] Crutches   [] Straight Cane   [] Knee immobilizer   [] IV pole    After Rounding and Checking on Patient     [x] Patient left in no apparent distress sitting up in chair  [] Patient left in no apparent distress in bed  [x] Call bell left within reach  [] SCDs on both legs & machine turned on  [x] Ice applied  [x] RN notified  []  present  [] Bed alarm activated    Reason patient not mobilized:      [] Patient refused   [] Nausea/vomiting   [] Low blood pressure   [] Drowsy/lethargic    Pain Rating:       Left patient with call light, cell phone and personal belongings in reach for safety.

## 2019-07-24 NOTE — PROGRESS NOTES
Problem: Falls - Risk of  Goal: *Absence of Falls  Description  Document Sidney Bella Fall Risk and appropriate interventions in the flowsheet.   Outcome: Resolved/Met  Note:   Fall Risk Interventions:  Mobility Interventions: Assess mobility with egress test, Communicate number of staff needed for ambulation/transfer, OT consult for ADLs, Patient to call before getting OOB, PT Consult for mobility concerns         Medication Interventions: Assess postural VS orthostatic hypotension, Evaluate medications/consider consulting pharmacy, Patient to call before getting OOB, Teach patient to arise slowly    Elimination Interventions: Call light in reach, Patient to call for help with toileting needs, Toilet paper/wipes in reach, Toileting schedule/hourly rounds    History of Falls Interventions: Bed/chair exit alarm, Consult care management for discharge planning, Evaluate medications/consider consulting pharmacy

## 2019-07-24 NOTE — PROGRESS NOTES
[address] [facility]  Inpatient Progress Note      2019  5:49 PM     Chart reviewed. Interval History/Events of Past 24 hours:   Walked down angel with PT    Subjective:  Pain in right hip improving    Objective:  Vital signs:   Visit Vitals  /74 (BP 1 Location: Left arm, BP Patient Position: At rest)   Pulse 93   Temp 97.4 °F (36.3 °C)   Resp 16   Ht 5' 9\" (1.753 m)   Wt 139 lb (63 kg)   SpO2 98%   BMI 20.53 kg/m²     Patient Vitals for the past 24 hrs:   Temp Pulse Resp BP SpO2   19 1716 97.4 °F (36.3 °C) 93 16 113/74 98 %   19 1028 98.4 °F (36.9 °C) 92 16 114/65    19 0541 99 °F (37.2 °C) 88 14 128/72 99 %   19 0238 99.4 °F (37.4 °C) 94 14 108/68 99 %   19 2333 98.9 °F (37.2 °C) 91 14 117/71 100 %   19 1937 99.9 °F (37.7 °C) 93 16 113/62 100 %     Temp (24hrs), Av.8 °F (37.1 °C), Min:97.4 °F (36.3 °C), Max:99.9 °F (37.7 °C)    Admission Weight: Last Weight   Weight: 139 lb (63 kg) Weight: 139 lb (63 kg)     Physical Examination:     General:  Alert, oriented, NAD  MS Exam: Right hip incisions clean and dry  NVI distally  Mild pain with hip IR/ER          Labs:  Recent Results (from the past 24 hour(s))   CBC WITH AUTOMATED DIFF    Collection Time: 19  4:42 AM   Result Value Ref Range    WBC 8.4 4.6 - 13.2 K/uL    RBC 2.96 (L) 4.70 - 5.50 M/uL    HGB 8.7 (L) 13.0 - 16.0 g/dL    HCT 26.2 (L) 36.0 - 48.0 %    MCV 88.5 74.0 - 97.0 FL    MCH 29.4 24.0 - 34.0 PG    MCHC 33.2 31.0 - 37.0 g/dL    RDW 12.4 11.6 - 14.5 %    PLATELET 860 805 - 886 K/uL    MPV 10.0 9.2 - 11.8 FL    NEUTROPHILS 65 40 - 73 %    LYMPHOCYTES 27 21 - 52 %    MONOCYTES 8 3 - 10 %    EOSINOPHILS 0 0 - 5 %    BASOPHILS 0 0 - 2 %    ABS. NEUTROPHILS 5.5 1.8 - 8.0 K/UL    ABS. LYMPHOCYTES 2.3 0.9 - 3.6 K/UL    ABS. MONOCYTES 0.6 0.05 - 1.2 K/UL    ABS. EOSINOPHILS 0.0 0.0 - 0.4 K/UL    ABS.  BASOPHILS 0.0 0.0 - 0.1 K/UL    DF AUTOMATED     METABOLIC PANEL, BASIC Collection Time: 07/24/19  4:42 AM   Result Value Ref Range    Sodium 137 136 - 145 mmol/L    Potassium 3.9 3.5 - 5.5 mmol/L    Chloride 104 100 - 111 mmol/L    CO2 29 21 - 32 mmol/L    Anion gap 4 3.0 - 18 mmol/L    Glucose 106 (H) 74 - 99 mg/dL    BUN 9 7.0 - 18 MG/DL    Creatinine 0.74 0.6 - 1.3 MG/DL    BUN/Creatinine ratio 12 12 - 20      GFR est AA >60 >60 ml/min/1.73m2    GFR est non-AA >60 >60 ml/min/1.73m2    Calcium 8.3 (L) 8.5 - 10.1 MG/DL       New Studies:   None    Assessment/Plan:    POD 3 s/p Right hip ORIF  -Final ortho recs:  -WBAT R LE  -DVT ppx x 4 weeks from surgery, recommend lovenox once daily if ok per primary team  -PT/OT  -DIspo per primary team to rehab likely pending PT recs  -Follow up in office 1 week after discharge for wound check and staples removal/x rays  -Please contact us with any questions  -No further acute ortho issues, will follow peripherally until discharge and be available as needed  -Thank you for assisting in patient care    Signed by Vince Mohs, MD

## 2019-07-24 NOTE — PROGRESS NOTES
Problem: Self Care Deficits Care Plan (Adult)  Goal: *Acute Goals and Plan of Care (Insert Text)  Description  Occupational Therapy Goals  Initiated 7/22/2019 within 7 day(s). 1.  Patient will perform functional activity/ADL task seated with independence for 3-5 minutes and G balance. 2.  Patient will perform upper body dressing standing with independence and G balance. 3.  Patient will perform lower body dressing with supervision/set-up using AE prn.  4.  Patient will perform toilet transfers with modified independence. 5.  Patient will perform all aspects of toileting with modified independence. 6.  Patient will participate in upper extremity therapeutic exercise/activities with independence for 5-7 minutes to increase BUE strength for self-care/ADLs. 7.  Patient will utilize energy conservation techniques during functional activities with verbal cues. Prior Level of Function: Patient was independent with self-care and functional mobility PTA. Pt reports having a SPC at home but does not need it for functional mobility. Outcome: Progressing Towards Goal   OCCUPATIONAL THERAPY TREATMENT    Patient: Michael Chow (61 y.o. male)  Date: 7/24/2019  Diagnosis: Femur fracture, right (HCC) [S72.91XA] <principal problem not specified>  Procedure(s) (LRB):  FEMUR INSERTION INTRA MEDULLARY NAIL RIGHT (Right) 3 Days Post-Op  Precautions: Fall, WBAT(RLE)    Chart, occupational therapy assessment, plan of care, and goals were reviewed. ASSESSMENT:  Pt is agreeable to participate in OT and to maneuver to the BR for toilet txfr training. Pt required Min A to std from the chair w/VCs for proper hands placement and to maneuver to the BR, occasionally requiring Mod A, especially w/turning and transitional movements. Pt educated on use of grab bars for support during functional txfrs, pt verbalized understanding.  Pt returned to the chair, was able to sit unsupported at the edge of the chair for grooming tasks w/set-up. Pt educated on mult energy conservation techniques to utilize in home environment, including pacing and deep breathing to prevent SOB and fatigue, and increase activity tolerance and safety w/ADLs and functional mobility, pt verbalized understanding, required Min VCs to follow throughout tasks. Progression toward goals:  ?          Improving appropriately and progressing toward goals  ? Improving slowly and progressing toward goals  ? Not making progress toward goals and plan of care will be adjusted     PLAN:  Patient continues to benefit from skilled intervention to address the above impairments. Continue treatment per established plan of care. Discharge Recommendations:  Rehab  Further Equipment Recommendations for Discharge:  bedside commode and rolling walker     SUBJECTIVE:   Patient stated [facility] you bring me brand new leg?     OBJECTIVE DATA SUMMARY:   Cognitive/Behavioral Status:  Neurologic State: Alert  Orientation Level: Oriented X4  Cognition: Follows commands  Safety/Judgement: Fall prevention    Functional Mobility and Transfers for ADLs:    Transfers:  Sit to Stand: Minimum assistance  Stand to Sit: Minimum assistance   Toilet Transfer : Minimum assistance(w/FWW)    Balance:  Sitting: Intact  Standing: Impaired; With support  Standing - Static: Fair  Standing - Dynamic : Fair    ADL Intervention:       Grooming  Grooming Assistance: Set-up(seated EOB)  Washing Face: Set-up  Washing Hands: Set-up  Brushing Teeth: Set-up    Pain:  Pain level pre-treatment: 0/10   Pain level post-treatment: 0/10    Activity Tolerance:    Fair  Please refer to the flowsheet for vital signs taken during this treatment. After treatment:   ?  Patient left in no apparent distress sitting up in chair  ? Patient left in no apparent distress in bed  ? Call bell left within reach  ? Nursing notified  ? Caregiver present  ?   Bed alarm activated    COMMUNICATION/EDUCATION:   ? Role of Occupational Therapy in the acute care setting  ? Home safety education was provided and the patient/caregiver indicated understanding. ? Patient/family have participated as able in working towards goals and plan of care. ? Patient/family agree to work toward stated goals and plan of care. ? Patient understands intent and goals of therapy, but is neutral about his/her participation. ? Patient is unable to participate in goal setting and plan of care.       Thank you for this referral.  JADA James  Time Calculation: 24 mins

## 2019-07-24 NOTE — PROGRESS NOTES
NUTRITION    Nutrition Screen      RECOMMENDATIONS / PLAN:     - Liberalize diet to general, healthy diet to promote meal intake. - Continue nutritional supplements: Ensure Enlive TID  - Continue RD inpatient monitoring and evaluation. NUTRITION INTERVENTIONS & DIAGNOSIS:     [x] Meals/snacks: modify composition  [x] Medical food supplement therapy: continue    Nutrition Diagnosis: Inadequate oral intake related to appetite and food preferences as evidenced by pt consuming <50% of most meals. ASSESSMENT:     7/24: Meal intake is poor; didn't eat anything at breakfast.  States nothing has taste, is bland. After asking clarifying questions, this appears be 2/2 food preferences vs loss of taste. Is consuming 100% of Ensure. Reviewed food preferences again. 7/23: femur fracture s/p fall; open reduction and fixation on 7/21. Meal intake is usually poor, but varies based on meal preferences/dislike of food. Drinks Ensure occasionally PTA; agreeable to consuming while inpatient to optimize nutritional intake. Reviewed food preferences and tolerances 2/2 missing teeth, declines changing diet order to make food softer.      Average po intake adequate to meet patients estimated nutritional needs:   [] Yes     [x] No   [] Unable to determine at this time    Diet: DIET NUTRITIONAL SUPPLEMENTS Breakfast, Lunch, Dinner, All Meals; ENSURE ENLIVE  DIET CARDIAC Regular      Food Allergies: NKFA  Current Appetite:   [] Good     [] Fair     [x] Poor     [] Other:  Appetite/meal intake prior to admission:   [] Good     [x] Fair     [] Poor     [] Other:  Feeding Limitations:  [] Swallowing difficulty    [x] Chewing difficulty: missing teeth    [] Other:  Current Meal Intake:   Patient Vitals for the past 100 hrs:   % Diet Eaten   07/23/19 1941 50 %   07/23/19 0828 0 %   07/22/19 1159 25 %   07/22/19 0833 50 %   07/21/19 1929 25 %   07/21/19 1518 25 %   07/21/19 0953 0 %   07/20/19 1858 0 %       BM: 7/24  Skin Integrity: surgical incision to right leg  Edema:   [x] No     [] Yes   Pertinent Medications: Reviewed: dulcolax, cyanocobalamin, pepcid, colace, ferous sulfate    Recent Labs     07/24/19  0442 07/23/19  0445 07/22/19  0507    137 137   K 3.9 3.5 3.6    104 105   CO2 29 28 27   * 104* 129*   BUN 9 13 10   CREA 0.74 0.85 0.85   CA 8.3* 8.2* 8.5   MG  --  1.8 1.8   ALB  --  2.8* 3.3*   SGOT  --  24 22   ALT  --  14* 16       Intake/Output Summary (Last 24 hours) at 7/24/2019 1221  Last data filed at 7/24/2019 0419  Gross per 24 hour   Intake 1080 ml   Output 675 ml   Net 405 ml       Anthropometrics:  Ht Readings from Last 1 Encounters:   07/21/19 5' 9\" (1.753 m)     Last 3 Recorded Weights in this Encounter    07/21/19 1327   Weight: 63 kg (139 lb)     Body mass index is 20.53 kg/m². Weight History:   Weight Metrics 7/21/2019 1/30/2019 7/12/2017 3/23/2017 3/6/2017 7/1/2016 3/16/2015   Weight 139 lb 131 lb 124 lb 132 lb 132 lb 123 lb 132 lb 6.4 oz   BMI 20.53 kg/m2 19.35 kg/m2 18.31 kg/m2 19.49 kg/m2 20.67 kg/m2 19.26 kg/m2 20.73 kg/m2        Admitting Diagnosis: Femur fracture, right (HCC) [S72.91XA]  Pertinent PMHx: hyperlipidemia    Education Needs:        [x] None identified  [] Identified - Not appropriate at this time  []  Identified and addressed - refer to education log  Learning Limitations:   [x] None identified  [] Identified    Cultural, Yazidism & ethnic food preferences:  [x] None identified    [] Identified and addressed     ESTIMATED NUTRITION NEEDS:     Calories: 5350-4453 kcal (MSJx1.2-1.3) based on  [x] Actual BW 63 kg     [] IBW   Protein: 50-75  gm (0.8-1.2 gm/kg) based on  [x] Actual BW      [] IBW   Fluid: 1 mL/kcal     MONITORING & EVALUATION:     Nutrition Goal(s):   1. Po intake of meals will meet >75% of patient estimated nutritional needs within the next 7 days.   Outcome:  [] Met/Ongoing    [x] Progressing towards goal    [] Not progressing towards goal    [] New/Initial goal Monitoring:   [x] Food and beverage intake   [x] Diet order   [x] Nutrition-focused physical findings   [x] Treatment/therapy   [] Weight   [] Enteral nutrition intake        Previous Recommendations (for follow-up assessments only):     [x]   Implemented       []   Not Implemented (RD to address)      [] No Longer Appropriate     [] No Recommendation Made     Discharge Planning: general, healthy diet transitioning to a cardiac diet if meal intake improves + Ensure Enlive TID  [x] Participated in care planning, discharge planning, & interdisciplinary rounds as appropriate      Madalyn Vaughan, RD, [address]   Pager: [phone]

## 2019-07-24 NOTE — PROGRESS NOTES
Spoke with [facility] this AM and still waiting for insurance authorization for SNF.  Alonzo Chung, [phone]

## 2019-07-25 VITALS
WEIGHT: 139 LBS | DIASTOLIC BLOOD PRESSURE: 67 MMHG | OXYGEN SATURATION: 100 % | BODY MASS INDEX: 20.59 KG/M2 | RESPIRATION RATE: 17 BRPM | HEIGHT: 69 IN | HEART RATE: 90 BPM | SYSTOLIC BLOOD PRESSURE: 119 MMHG | TEMPERATURE: 98.7 F

## 2019-07-25 LAB
ANION GAP SERPL CALC-SCNC: 4 MMOL/L (ref 3–18)
BASOPHILS # BLD: 0 K/UL (ref 0–0.1)
BASOPHILS NFR BLD: 0 % (ref 0–2)
BUN SERPL-MCNC: 7 MG/DL (ref 7–18)
BUN/CREAT SERPL: 11 (ref 12–20)
CALCIUM SERPL-MCNC: 8.2 MG/DL (ref 8.5–10.1)
CHLORIDE SERPL-SCNC: 104 MMOL/L (ref 100–111)
CO2 SERPL-SCNC: 30 MMOL/L (ref 21–32)
CREAT SERPL-MCNC: 0.64 MG/DL (ref 0.6–1.3)
DIFFERENTIAL METHOD BLD: ABNORMAL
EOSINOPHIL # BLD: 0.1 K/UL (ref 0–0.4)
EOSINOPHIL NFR BLD: 1 % (ref 0–5)
ERYTHROCYTE [DISTWIDTH] IN BLOOD BY AUTOMATED COUNT: 12.2 % (ref 11.6–14.5)
GLUCOSE SERPL-MCNC: 98 MG/DL (ref 74–99)
HCT VFR BLD AUTO: 24.6 % (ref 36–48)
HGB BLD-MCNC: 8.3 G/DL (ref 13–16)
LYMPHOCYTES # BLD: 2.6 K/UL (ref 0.9–3.6)
LYMPHOCYTES NFR BLD: 36 % (ref 21–52)
MCH RBC QN AUTO: 29.6 PG (ref 24–34)
MCHC RBC AUTO-ENTMCNC: 33.7 G/DL (ref 31–37)
MCV RBC AUTO: 87.9 FL (ref 74–97)
MONOCYTES # BLD: 0.4 K/UL (ref 0.05–1.2)
MONOCYTES NFR BLD: 6 % (ref 3–10)
NEUTS SEG # BLD: 4.1 K/UL (ref 1.8–8)
NEUTS SEG NFR BLD: 57 % (ref 40–73)
PLATELET # BLD AUTO: 184 K/UL (ref 135–420)
PMV BLD AUTO: 10.3 FL (ref 9.2–11.8)
POTASSIUM SERPL-SCNC: 3.6 MMOL/L (ref 3.5–5.5)
RBC # BLD AUTO: 2.8 M/UL (ref 4.7–5.5)
SODIUM SERPL-SCNC: 138 MMOL/L (ref 136–145)
WBC # BLD AUTO: 7.2 K/UL (ref 4.6–13.2)

## 2019-07-25 PROCEDURE — 74011250636 HC RX REV CODE- 250/636: Performed by: ORTHOPAEDIC SURGERY

## 2019-07-25 PROCEDURE — 80048 BASIC METABOLIC PNL TOTAL CA: CPT

## 2019-07-25 PROCEDURE — 85025 COMPLETE CBC W/AUTO DIFF WBC: CPT

## 2019-07-25 PROCEDURE — 74011250637 HC RX REV CODE- 250/637: Performed by: FAMILY MEDICINE

## 2019-07-25 PROCEDURE — 74011250637 HC RX REV CODE- 250/637: Performed by: ORTHOPAEDIC SURGERY

## 2019-07-25 PROCEDURE — 36415 COLL VENOUS BLD VENIPUNCTURE: CPT

## 2019-07-25 RX ORDER — PANTOPRAZOLE SODIUM 40 MG/1
40 TABLET, DELAYED RELEASE ORAL
Status: DISCONTINUED | OUTPATIENT
Start: 2019-07-25 | End: 2019-07-26 | Stop reason: HOSPADM

## 2019-07-25 RX ADMIN — FERROUS SULFATE TAB 325 MG (65 MG ELEMENTAL FE) 325 MG: 325 (65 FE) TAB at 08:44

## 2019-07-25 RX ADMIN — CYANOCOBALAMIN TAB 1000 MCG 1000 MCG: 1000 TAB at 08:44

## 2019-07-25 RX ADMIN — NALOXEGOL OXALATE 25 MG: 25 TABLET, FILM COATED ORAL at 06:58

## 2019-07-25 RX ADMIN — CARBAMAZEPINE 400 MG: 200 TABLET, EXTENDED RELEASE ORAL at 08:44

## 2019-07-25 RX ADMIN — OXYCODONE HYDROCHLORIDE 5 MG: 5 TABLET ORAL at 02:38

## 2019-07-25 RX ADMIN — PANTOPRAZOLE SODIUM 40 MG: 40 TABLET, DELAYED RELEASE ORAL at 10:00

## 2019-07-25 RX ADMIN — DOCUSATE SODIUM 100 MG: 100 CAPSULE, LIQUID FILLED ORAL at 08:44

## 2019-07-25 RX ADMIN — DOCUSATE SODIUM 100 MG: 100 CAPSULE, LIQUID FILLED ORAL at 18:12

## 2019-07-25 RX ADMIN — FAMOTIDINE 20 MG: 20 TABLET ORAL at 08:44

## 2019-07-25 RX ADMIN — ENOXAPARIN SODIUM 40 MG: 40 INJECTION SUBCUTANEOUS at 08:44

## 2019-07-25 NOTE — PROGRESS NOTES
Spoke with [facility] at [facility]. [facility] stated Still waiting on insurance authorization. Informed [facility] that patient has discharge orders for today.     Kaitlin Joy RN  Care Manager  [phone]

## 2019-07-25 NOTE — PROGRESS NOTES
Mobility Intervention:       [] Pt dangled at edge of bed    [] Pt assisted OOB to bedside commode    [] Pt assisted OOB to chair    [] Pt ambulated to bathroom    [] Patient was ambulated in room/hallway    Assistive Device Utilized:       [] Rolling walker   [] Crutches   [] Straight Cane   [] Knee immobilizer   [] IV pole    After Rounding and Checking on Patient     [x] Patient left in no apparent distress sitting up in chair  [] Patient left in no apparent distress in bed  [x] Call bell left within reach  [x] SCDs on both legs & machine turned on  [x] Ice refused  [] RN notified  []  present  [] Bed alarm activated    Reason patient not mobilized:      [] Patient refused   [] Nausea/vomiting   [] Low blood pressure   [] Drowsy/lethargic    Pain Rating:       Left patient with call light, cell phone and personal belongings in reach for safety. Rounded on Angel Luis Mckeon to provide educational material on osteoporosis. Educated the patient about the importance of following up their PCP to discuss being screened for osteoporosis since the are at high risk to refracture. Discussed the importance of taking Vitamin D and Calcium daily under the supervision of their PCP. Also discussed making sure to get home environment set up for safety to prevent future falls which can lead to more fractures. Shared with the patient the increased risk they are at for a future fall and fracture over the next 12 months. Patient given educational material to review. Encouraged the patient to ask questions. Patient left with call light in reach. All belongings in reach and feeling safe. Patient encouraged to call for assistance and not to get up on their own. Patient verbalizing understanding.      Orthopedic

## 2019-07-25 NOTE — PROGRESS NOTES
Per Natasha (BING) called [address] transportation at [phone] to schedule a 5:00 pm stretcher transport to [facility] with [place] and received confirmation number [id number]. Called [facility] scheduled 5:00 pm transport to [facility] with [facility]. Gave Re[facility]ka confirmation number [id number] . Informed Natasha of transportation arrangements.

## 2019-07-25 NOTE — ROUTINE PROCESS
End of Shift Note     Bedside and verbal shift change report given to Rosa Nunez (On coming nurse) by Coral Bartlett (Off going nurse).   Report included the following information:Procedure Summary, Intake/Output, MAR, Recent Results, Med Rec Status, and SBAR  (Situation, Background, Assessment and Recommendations)    SBAR Recommendations: monitor pain    Issues for Provider to address Fall risk    Activity This Shift      [] Bedrest   [] Dangle   [x] Chair Ambulated to     [] Bathroom     [] BSC     [] Refused Ambulated In    [] Room    [] Hallway     Bladder Scan  Voiding Status [] Yes  [x] Void [] No  [] Hodges [x] N/A  [] I&O Cath   Bladder Irrigation Managed [] Yes [x] No Time Bag Changed    Blood Sugars Managed [] Yes [x] No [] N/A   Bowel Movement  Passing Gas [x] Yes  [x] Yes [] No  [] No    CHG Bath Done     Before Surgery     After Surgery   [] Yes  [x] Yes   [] No  [] No   [x] N/A  [] N/A   Drain Removed [] Yes [] No [x] N/A   Dressing Checked  Dressing Changed [x] Yes  [] Yes [] No  [x] No [] N/A  [] N/A   Nausea/Vomiting [] Yes [x] No    Ice Packs Changed [x] Yes [] No [] N/A   Incentive Spirometer  [x] Yes [] No Volume 2500   Ostomy Assessed Change/Emptied [] Yes  [] Yes [] No  [] No [x] N/A  [x] N/A   SCD Pumps On Bilaterally  Ankle Pumping  [x] Yes  [] Yes [] Yes  [] No [] N/A  [] N/A    Supplemental O2 [] Yes [x] No Sat Off O2    Telemetry Monitoring [] Yes [x] No Rhythm    Up to Chair for Meals [x] Yes [] No [] N/A   Urinal In Bathroom [] Yes [x] No [] N/A   Urinal Emptied  Bathroom \"Hat\" Emptied [x] Yes  [] Yes [] No  [] No [] N/A  [x] N/A

## 2019-07-25 NOTE — DISCHARGE INSTRUCTIONS
DISCHARGE SUMMARY from Nurse    PATIENT INSTRUCTIONS:    After general anesthesia or intravenous sedation, for 24 hours or while taking prescription Narcotics:  · Limit your activities  · Do not drive and operate hazardous machinery  · Do not make important personal or business decisions  · Do  not drink alcoholic beverages  · If you have not urinated within 8 hours after discharge, please contact your surgeon on call. Report the following to your surgeon:  · Excessive pain, swelling, redness or odor of or around the surgical area  · Temperature over 100.5  · Nausea and vomiting lasting longer than 4 hours or if unable to take medications  · Any signs of decreased circulation or nerve impairment to extremity: change in color, persistent  numbness, tingling, coldness or increase pain  · Any questions    What to do at Home:  Recommended activity: Activity as tolerated,     If you experience any of the following symptoms fever, chills, shortness of breath, please follow up with md.    *  Please give a list of your current medications to your Primary Care Provider. *  Please update this list whenever your medications are discontinued, doses are      changed, or new medications (including over-the-counter products) are added. *  Please carry medication information at all times in case of emergency situations. These are general instructions for a healthy lifestyle:    No smoking/ No tobacco products/ Avoid exposure to second hand smoke  Surgeon General's Warning:  Quitting smoking now greatly reduces serious risk to your health.     Obesity, smoking, and sedentary lifestyle greatly increases your risk for illness    A healthy diet, regular physical exercise & weight monitoring are important for maintaining a healthy lifestyle    You may be retaining fluid if you have a history of heart failure or if you experience any of the following symptoms:  Weight gain of 3 pounds or more overnight or 5 pounds in a week, increased swelling in our hands or feet or shortness of breath while lying flat in bed. Please call your doctor as soon as you notice any of these symptoms; do not wait until your next office visit. The discharge information has been reviewed with the patient. The patient verbalized understanding. Discharge medications reviewed with the patient and appropriate educational materials and side effects teaching were provided. ___________________________________________________________________________________________________________________________________NUTRITION       Continue Oral Nutrition Supplement (ONS) at discharge. Recommend Ensure Enlive or a comparable/similiar product Three times daily for 30 days unless otherwise directed by your Primary Care Physician.        Christiano Mcclellan RD

## 2019-07-25 NOTE — ROUTINE PROCESS
Bedside and Verbal shift change report given by Annette Alvarez (offgoing nurse) to Shanna Tyler RN (oncoming nurse). Report included the following information SBAR, Kardex and MAR.

## 2019-07-25 NOTE — PROGRESS NOTES
conducted a Follow up consultation and Spiritual Assessment for Adelita Parson, who is a 77 y.o.,male. The  provided the following Interventions:  Continued the relationship of care and support. Listened empathically as the patient shared the reason for his hopsitalization. Offered prayer and assurance of continued prayer on patients behalf. The following outcomes were achieved:  Patient expressed gratitude for 's visit. Assessment:  There are no further spiritual or Sikh issues which require Spiritual Care Services interventions at this time. Plan:  Chaplains will continue to follow and will provide pastoral care on an as needed/requested basis.  recommends bedside caregivers page  on duty if patient shows signs of acute spiritual or emotional distress.        [address] [place] [address]   [phone]

## 2019-07-25 NOTE — ROUTINE PROCESS
End of Shift Note     Bedside and verbal shift change report given to Renzo Boles RN (On coming nurse) by Alejandra Miner RN (Off going nurse).   Report included the following information:Procedure Summary, Intake/Output, MAR, Recent Results, Med Rec Status, and SBAR  (Situation, Background, Assessment and Recommendations)    SBAR Recommendations: none    Issues for Provider to address none    Activity This Shift      [] Bedrest   [] Dangle   [x] Chair Ambulated to     [] Bathroom     [x] BSC     [] Refused Ambulated In    [x] Room    [] Hallway     Bladder Scan  Voiding Status [] Yes  [x] Void [x] No  [] Hodges [] N/A  [] I&O Cath   Bladder Irrigation Managed [] Yes [x] No Time Bag Changed N/A   Blood Sugars Managed [] Yes [x] No [] N/A   Bowel Movement  Passing Gas [x] Yes  [x] Yes [] No  [] No    CHG Bath Done     Before Surgery     After Surgery   [x] Yes  [x] Yes   [] No  [] No   [] N/A  [] N/A   Drain Removed [] Yes [x] No [] N/A   Dressing Checked  Dressing Changed [x] Yes  [] Yes [] No  [x] No [] N/A  [] N/A   Nausea/Vomiting [] Yes [x] No    Ice Packs Changed [] Yes [x] No [] N/A   Incentive Spirometer  [x] Yes [] No Volume 1800   Ostomy Assessed Change/Emptied [] Yes  [] Yes [x] No  [x] No [] N/A  [] N/A   SCD Pumps On Bilaterally  Ankle Pumping  [x] Yes  [] Yes [] Yes  [x] No [] N/A  [] N/A    Supplemental O2 [] Yes [x] No Sat Off O2 94%   Telemetry Monitoring [] Yes [x] No Rhythm NSR   Up to Chair for Meals [x] Yes [] No [] N/A   Urinal In Bathroom [x] Yes [] No [] N/A   Urinal Emptied  Bathroom \"Hat\" Emptied [x] Yes  [] Yes [] No  [] No [] N/A  [] N/A

## 2019-07-25 NOTE — PROGRESS NOTES
Received call from [facility] liaison, who stated insurance authorization has been approved for pt. Notified floor Natasha SMART. [facility] is expecting pt today.  Jesus Gill, [phone]

## 2019-07-25 NOTE — PROGRESS NOTES
Progress Note    Patient: Benji Bedoya MRN: [id number]  CSN: [id number]    YOB: 1950  Age: 77 y.o. Sex: male    DOA: 7/20/2019 LOS:  LOS: 5 days                    Subjective:   POD #5 IM nail R femur  Patient is sleeping comfortably upon arrival and easily aroused from sleep. States he is \"doing alright\" and pain is okay. He felt stronger yesterday while ambulating and is feeling better overall. Patient's only concern is regarding SNF placement. He wonders if help is able to come to his home if not able to go to SNF. Hgb continues to be low. Patient remains asymptomatic. Chief Complaint:   Chief Complaint   Patient presents with    Fall    Hip Pain       Review of systems  General: Denies fevers or chills. Cardiovascular: Denies chest pain and palpitations. Pulmonary: Denies shortness of breath and cough. Gastrointestinal: Denies abdominal pain and nausea. Tolerating diet. Had bowel movement yesterday (7/24)  Genitourinary: Denies dysuria. Musculoskeletal: Per HPI. Neurologic: Denies dizziness, numbness and tingling. Weakness improving. Objective:     Physical Exam:  Visit Vitals  /81 (BP 1 Location: Left arm, BP Patient Position: At rest;Sitting)   Pulse 96   Temp 97.3 °F (36.3 °C)   Resp 17   Ht 5' 9\" (1.753 m)   Wt 63 kg (139 lb)   SpO2 99%   BMI 20.53 kg/m²        General:         Alert, cooperative, no acute distress    HEENT: NC, Atraumatic. Anicteric sclerae. Lungs: CTAB, diminished in the bases. Heart:  Regular rate and rhythm w/o m/r/g. Radial pulses B and dorsalis pedis pulses B 2+. Abdomen: Soft, non distended, non tender. Bowel sounds present. Extremities: Dressing to R lateral hip clean, dry and intact. No edema noted. Full ROM with RLE improving in strength. Psych:   Agitated this am, he reports concern about racial prejudice in regard to why insurance has not yet approved SNF placement. Neurologic:  Alert and oriented X 3.  Patient follows commands and appropriate. Intake and Output:  Current Shift:  No intake/output data recorded. Last three shifts:  07/23 1901 - 07/25 0700  In: 1920 [P.O.:1920]  Out: 1185 [Urine:1575]    Labs: Results:       Chemistry Recent Labs     07/25/19 0455 07/24/19 0442 07/23/19 0445   GLU 98 106* 104*    137 137   K 3.6 3.9 3.5    104 104   CO2 30 29 28   BUN 7 9 13   CREA 0.64 0.74 0.85   CA 8.2* 8.3* 8.2*   AGAP 4 4 5   BUCR 11* 12 15   AP  --   --  63   TP  --   --  5.9*   ALB  --   --  2.8*   GLOB  --   --  3.1   AGRAT  --   --  0.9      CBC w/Diff Recent Labs     07/25/19 0455 07/24/19 0442 07/23/19 0445   WBC 7.2 8.4 11.0   RBC 2.80* 2.96* 3.11*   HGB 8.3* 8.7* 9.3*   HCT 24.6* 26.2* 27.4*    140 144   GRANS 57 65 62   LYMPH 36 27 31   EOS 1 0 0      Cardiac Enzymes No results for input(s): CPK, CKND1, DASHA in the last 72 hours. No lab exists for component: CKRMB, TROIP   Coagulation No results for input(s): PTP, INR, APTT in the last 72 hours. No lab exists for component: INREXT    Lipid Panel Lab Results   Component Value Date/Time    Cholesterol, total 198 07/21/2019 05:22 AM    HDL Cholesterol 108 (H) 07/21/2019 05:22 AM    LDL, calculated 76 07/21/2019 05:22 AM    VLDL, calculated 14 07/21/2019 05:22 AM    Triglyceride 70 07/21/2019 05:22 AM    CHOL/HDL Ratio 1.8 07/21/2019 05:22 AM      BNP No results for input(s): BNPP in the last 72 hours.    Liver Enzymes Recent Labs     07/23/19 0445   TP 5.9*   ALB 2.8*   AP 63   SGOT 24      Thyroid Studies Lab Results   Component Value Date/Time    TSH 0.67 07/21/2019 05:22 AM          Procedures/imaging: see electronic medical records for all procedures/Xrays and details which were not copied into this note but were reviewed prior to creation of Plan    Medications:   Current Facility-Administered Medications   Medication Dose Route Frequency    cyanocobalamin tablet 1,000 mcg  1,000 mcg Oral DAILY    ferrous sulfate tablet 325 mg  1 Tab Oral DAILY WITH BREAKFAST    naloxegol (MOVANTIK) tablet 25 mg  25 mg Oral ACB    enoxaparin (LOVENOX) injection 40 mg  40 mg SubCUTAneous DAILY    oxyCODONE IR (ROXICODONE) tablet 5 mg  5 mg Oral Q4H PRN    acetaminophen (TYLENOL) tablet 650 mg  650 mg Oral Q4H PRN    carBAMazepine XR (TEGretol XR) tablet 400 mg  400 mg Oral Q12H    latanoprost (XALATAN) 0.005 % ophthalmic solution 1 Drop  1 Drop Both Eyes QHS    pravastatin (PRAVACHOL) tablet 40 mg  40 mg Oral QHS    docusate sodium (COLACE) capsule 100 mg  100 mg Oral BID    bisacodyl (DULCOLAX) suppository 10 mg  10 mg Rectal DAILY PRN    famotidine (PEPCID) tablet 20 mg  20 mg Oral BID       Assessment/Plan     Active Problems:    Epilepsy ([facility] (7/12/2017)      Congenital hemiparesis ([facility] (7/12/2017)      Femur fracture, right ([facility] (7/20/2019)      Closed displaced intertrochanteric fracture of right femur ([facility] (7/21/2019)      B12 deficiency (7/24/2019)    Plan:  Femur fracture, right s/p IM nail  Continue to work with PT and OT until SNF finalized. Discuss with team whether patient able to receive assistance at home versus SNF. Encourage ambulation as tolerated and with assistance. Ortho recommends WBAT, f/u 1 week for suture removal/xray/wound check. Patient tolerating PO Oxycodone and pain well controlled.     Constipation - improved  Continue to encourage patient to eat and drink. Continue docusate, Colace and Movantik as scheduled. Dulcolax prn     Asymptomatic Anemia - mixed anemia blood loss, iron deficiency and b12 deficiency  Continue scheduled cyanocobalamin and ferrous sulfate supplementation. Continue to monitor Hgb level and monitor patient for pallor, SOB, KING, increased weakness, altered VS.     Hypokalemia, resolved  Encourage PO intake, monitor     History of seizures  Continue tegretol     Continue DVT prophylaxis with Lovenox and SCDs. Continue GI prophylaxis with Pepcid.     García Cardenas  7/25/2019 7:12 AM      Patient seen and examined independently. Reviewed PA student note and changes made where appropriate.   Agree with assessment and plan    Signed By: Hodan Capellan MD     July 25, 2019     [address]

## 2019-07-25 NOTE — PROGRESS NOTES
Communication to Patient/Family:  Met with patient and family and they are agreeable to the transition plan. SNF/Rehab Transition:  Patient has been accepted to [facility] SNF/Rehab and meets criteria for admission. Patient will transported by Yetta Murray and expected to leave at 5:00pm.    Communication to SNF/Rehab:  Bedside RN, Keyshawn Padilla, has been notified to update the transition plan to the facility and call report ([phone]). Discharge information has been updated on the AVS. And communicated to facility via PowerPot/All Scripts, or CC link. Discharge instructions to be fax'd to facility. Nursing Please include all hard scripts for controlled substances, med rec and dc summary, and AVS in packet. Reviewed and confirmed with facility, [address] [place] [address], can manage the patient care needs for the following:     Bella Ling with (X) only those applicable:  Medication:  [x]Medications are available at the facility  []IV Antibiotics    []Controlled Substance  hard copies available sent. []Weekly Labs    Equipment:  []CPAP/BiPAP  []Wound Vacuum  []Hodges or Urinary Device  []PICC/Central Line  []Nebulizer  []Ventilator    Treatment:  []Isolation (for MRSA, VRE, etc.)  []Surgical Drain Management  []Tracheostomy Care  []Dressing Changes  []Dialysis with transportation  []PEG Care  []Oxygen  []Daily Weights for Heart Failure    Dietary:  []Any diet limitations  []Tube Feedings   []Total Parenteral Management (TPN)    Financial Resources:  []Medicaid Application Completed/Tracking #  [] LTSS Screening Completed  []Level II Completed    Advanced Care Plan:  []Surrogate Decision Maker of Care  []POA  []Communicated Code Status and copy sent.     Other:          Benito De La Rosa RN  Care Manager  [phone]

## 2019-07-25 NOTE — PROGRESS NOTES
Called Clemencia at [facility]. Still waiting on insurance authorization.     Fabrizio Ridley, RN  Care Manager  [phone]

## 2019-07-26 NOTE — PROGRESS NOTES
Discharge Planning:  (late entry) 7/25/19-6:40 p.m. This writer received a call from [employer] Who reporting that [address] transport refused to transport this pt to his expected [address]. This writer called for a new transport with this pt's [employer]. New ride to SNF arranged  For pick-up new reference # [id number]. Pt arrived at [facility] at 8:30 p.m. Per nursing staff at this facility.     [address] [place] [address]

## 2019-07-26 NOTE — ROUTINE PROCESS
Denied by transport because patient was up walking in room. Notified  Lindsey oliver. Transportation rearrange by patient.

## 2019-07-29 ENCOUNTER — HOSPITAL ENCOUNTER (OUTPATIENT)
Dept: LAB | Age: 69
Discharge: HOME | End: 2019-07-29
Payer: MEDICARE

## 2019-07-29 LAB
ALBUMIN SERPL-MCNC: 3.3 G/DL (ref 3.4–5)
ALBUMIN/GLOB SERPL: 1.2 {RATIO} (ref 0.8–1.7)
ALP SERPL-CCNC: 84 U/L (ref 45–117)
ALT SERPL-CCNC: 36 U/L (ref 16–61)
ANION GAP SERPL CALC-SCNC: 4 MMOL/L (ref 3–18)
AST SERPL-CCNC: 33 U/L (ref 10–38)
BASOPHILS # BLD: 0 K/UL (ref 0–0.1)
BASOPHILS NFR BLD: 0 % (ref 0–2)
BILIRUB SERPL-MCNC: 0.6 MG/DL (ref 0.2–1)
BUN SERPL-MCNC: 9 MG/DL (ref 7–18)
BUN/CREAT SERPL: 12 (ref 12–20)
CALCIUM SERPL-MCNC: 8.9 MG/DL (ref 8.5–10.1)
CHLORIDE SERPL-SCNC: 102 MMOL/L (ref 100–111)
CO2 SERPL-SCNC: 32 MMOL/L (ref 21–32)
CREAT SERPL-MCNC: 0.77 MG/DL (ref 0.6–1.3)
DIFFERENTIAL METHOD BLD: ABNORMAL
EOSINOPHIL # BLD: 0.1 K/UL (ref 0–0.4)
EOSINOPHIL NFR BLD: 2 % (ref 0–5)
ERYTHROCYTE [DISTWIDTH] IN BLOOD BY AUTOMATED COUNT: 13 % (ref 11.6–14.5)
GLOBULIN SER CALC-MCNC: 2.7 G/DL (ref 2–4)
GLUCOSE SERPL-MCNC: 94 MG/DL (ref 74–99)
HCT VFR BLD AUTO: 27.9 % (ref 36–48)
HGB BLD-MCNC: 9.2 G/DL (ref 13–16)
LYMPHOCYTES # BLD: 2 K/UL (ref 0.9–3.6)
LYMPHOCYTES NFR BLD: 26 % (ref 21–52)
MAGNESIUM SERPL-MCNC: 2 MG/DL (ref 1.6–2.6)
MCH RBC QN AUTO: 30.2 PG (ref 24–34)
MCHC RBC AUTO-ENTMCNC: 33 G/DL (ref 31–37)
MCV RBC AUTO: 91.5 FL (ref 74–97)
MONOCYTES # BLD: 0.5 K/UL (ref 0.05–1.2)
MONOCYTES NFR BLD: 7 % (ref 3–10)
NEUTS SEG # BLD: 4.9 K/UL (ref 1.8–8)
NEUTS SEG NFR BLD: 65 % (ref 40–73)
PLATELET # BLD AUTO: 332 K/UL (ref 135–420)
PMV BLD AUTO: 9.4 FL (ref 9.2–11.8)
POTASSIUM SERPL-SCNC: 4 MMOL/L (ref 3.5–5.5)
PROT SERPL-MCNC: 6 G/DL (ref 6.4–8.2)
RBC # BLD AUTO: 3.05 M/UL (ref 4.7–5.5)
SODIUM SERPL-SCNC: 138 MMOL/L (ref 136–145)
WBC # BLD AUTO: 7.5 K/UL (ref 4.6–13.2)

## 2019-07-29 PROCEDURE — 80053 COMPREHEN METABOLIC PANEL: CPT

## 2019-07-29 PROCEDURE — 83735 ASSAY OF MAGNESIUM: CPT

## 2019-07-29 PROCEDURE — 85025 COMPLETE CBC W/AUTO DIFF WBC: CPT

## 2019-07-29 PROCEDURE — 36415 COLL VENOUS BLD VENIPUNCTURE: CPT

## 2019-08-01 ENCOUNTER — PATIENT OUTREACH (OUTPATIENT)
Dept: CASE MANAGEMENT | Age: 69
End: 2019-08-01

## 2019-08-01 ENCOUNTER — HOSPITAL ENCOUNTER (OUTPATIENT)
Dept: LAB | Age: 69
Discharge: HOME | End: 2019-08-01
Payer: MEDICARE

## 2019-08-01 LAB
INR PPP: 1.1 (ref 0.8–1.2)
PROTHROMBIN TIME: 14 SEC (ref 11.5–15.2)

## 2019-08-01 PROCEDURE — 85610 PROTHROMBIN TIME: CPT

## 2019-08-02 ENCOUNTER — OFFICE VISIT (OUTPATIENT)
Dept: ORTHOPEDIC SURGERY | Age: 69
End: 2019-08-02

## 2019-08-02 VITALS
TEMPERATURE: 96.2 F | SYSTOLIC BLOOD PRESSURE: 118 MMHG | RESPIRATION RATE: 11 BRPM | DIASTOLIC BLOOD PRESSURE: 69 MMHG | OXYGEN SATURATION: 93 % | HEIGHT: 69 IN | BODY MASS INDEX: 20.59 KG/M2 | WEIGHT: 139 LBS | HEART RATE: 85 BPM

## 2019-08-02 DIAGNOSIS — Z87.81 S/P ORIF (OPEN REDUCTION INTERNAL FIXATION) FRACTURE: ICD-10-CM

## 2019-08-02 DIAGNOSIS — Z98.890 S/P ORIF (OPEN REDUCTION INTERNAL FIXATION) FRACTURE: ICD-10-CM

## 2019-08-02 DIAGNOSIS — S72.141D CLOSED DISPLACED INTERTROCHANTERIC FRACTURE OF RIGHT FEMUR WITH ROUTINE HEALING, SUBSEQUENT ENCOUNTER: ICD-10-CM

## 2019-08-02 DIAGNOSIS — M25.551 RIGHT HIP PAIN: Primary | ICD-10-CM

## 2019-08-02 NOTE — PROGRESS NOTES
1. Have you been to the ER, urgent care clinic since your last visit? Hospitalized since your last visit? No    2. Have you seen or consulted any other health care providers outside of the [address] [place] [address] since your last visit? Include any pap smears or colon screening.  No

## 2019-08-02 NOTE — PROGRESS NOTES
Kennedy Chappell  1950     HISTORY OF PRESENT ILLNESS  Kennedy Chappell is a 77 y.o. male who presents today for evaluation s/p right hip intramedullary nailing on 7/21/19. Pt rates he pain 8/10 today. He is doing well this morning. He is having a pulling pain at the incision from the staples. He is participating in PT and ready to go home. He is currently in a rehab center for his recovery. Patient denies any fever, chills, chest pain, shortness of breath or calf pain. There are no new illness or injuries to report since last seen in the office. PHYSICAL EXAM:   Visit Vitals  /69   Pulse 85   Temp 96.2 °F (35.7 °C) (Oral)   Resp 11   Ht 5' 9\" (1.753 m)   Wt 139 lb (63 kg)   SpO2 93%   BMI 20.53 kg/m²      The patient is a well-developed, well-nourished male in no acute distress. The patient is alert and oriented times three. The patient appears to be well groomed. Mood and affect are normal.  ORTHOPEDIC EXAM of right hip:  Inspection: No swelling, no bruising  Incision, clean, dry, intact, staples in place  Range of motion: hip rotates without pain  ttp incisional  Stability: Stable  Strength: 4/5 forward flexion    IMPRESSION:      ICD-10-CM ICD-9-CM    1. Right hip pain M25.551 719.45 AMB POC X-RAY RADEX HIP UNI WITH PELVIS 2-3 VIEWS   2. Closed displaced intertrochanteric fracture of right femur with routine healing, subsequent encounter S72.141D V54.13 AMB POC X-RAY RADEX HIP UNI WITH PELVIS 2-3 VIEWS   3. S/P ORIF (open reduction internal fixation) fracture Z96.7 V45.89     Z87.81 V15.51         PLAN:   Pt doing well post operatively  Incision cleaned. staples removed and steri strips applied  He will continue PT at the facility he is in.  He is doing well and is WBAT with a walker  Pt not given a refill of pain medications today  RTC 4 weeks for repeat xrays and evaluation        Nikki Murphy 150 and Spine Specialist

## 2019-08-09 ENCOUNTER — PATIENT OUTREACH (OUTPATIENT)
Dept: CASE MANAGEMENT | Age: 69
End: 2019-08-09

## 2019-08-14 NOTE — PROGRESS NOTES
Community Care Team Documentation for Patient in [facility]     Patient discharged from [facility]  to [facility], on 7/26/19. Hospital Discharge diagnosis:       Fractured right Femur   Epilepsy   Congenital Hemiparesis      SNF Attending Provider:  Terese Duenas    Anticipated discharge date from SNF:  8/10/19    PCP : Maribeth Pérez MD    Nurse Navigator:     [facility] rounds completed, updates provided by facility. Brief Summary of Care:    Doing well, niece involved in care. On target to D/c. [facility] when goes home. Home with niece on 8/10/19 does not look on chart review that niece chose to go with [facility]    RRAT:  Low Risk            3       Total Score        3 Has Seen PCP in Last 6 Months (Yes=3, No=0)        Criteria that do not apply:    . Living with Significant Other. Assisted Living. LTAC. SNF. or   Rehab    Patient Length of Stay (>5 days = 3)    IP Visits Last 12 Months (1-3=4, 4=9, >4=11)    Pt.  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

## 2019-08-27 ENCOUNTER — OFFICE VISIT (OUTPATIENT)
Dept: ORTHOPEDIC SURGERY | Facility: CLINIC | Age: 69
End: 2019-08-27

## 2019-08-27 VITALS
TEMPERATURE: 96.6 F | HEART RATE: 84 BPM | SYSTOLIC BLOOD PRESSURE: 148 MMHG | BODY MASS INDEX: 19.11 KG/M2 | WEIGHT: 129 LBS | OXYGEN SATURATION: 100 % | RESPIRATION RATE: 18 BRPM | DIASTOLIC BLOOD PRESSURE: 77 MMHG | HEIGHT: 69 IN

## 2019-08-27 DIAGNOSIS — Z87.81 S/P ORIF (OPEN REDUCTION INTERNAL FIXATION) FRACTURE: ICD-10-CM

## 2019-08-27 DIAGNOSIS — Z98.890 S/P ORIF (OPEN REDUCTION INTERNAL FIXATION) FRACTURE: ICD-10-CM

## 2019-08-27 DIAGNOSIS — M89.8X5 PAIN IN RIGHT FEMUR: Primary | ICD-10-CM

## 2019-08-27 DIAGNOSIS — S72.141D CLOSED DISPLACED INTERTROCHANTERIC FRACTURE OF RIGHT FEMUR WITH ROUTINE HEALING, SUBSEQUENT ENCOUNTER: ICD-10-CM

## 2019-08-27 NOTE — PROGRESS NOTES
Lashawn Brown  1950     HISTORY OF PRESENT ILLNESS  Lashawn Brown is a 77 y.o. male who presents today for evaluation s/p right hip intramedullary nailing on 7/21/19. Pt rates he pain 0/10 today. He is doing well this morning. He is participating in PT and ready to go home. He has been doing well WBAT with a walker. He is currently in a rehab center for his recovery. Patient denies any fever, chills, chest pain, shortness of breath or calf pain. There are no new illness or injuries to report since last seen in the office. PHYSICAL EXAM:   Visit Vitals  /77   Pulse 84   Temp 96.6 °F (35.9 °C) (Oral)   Resp 18   Ht 5' 9\" (1.753 m)   Wt 129 lb (58.5 kg)   SpO2 100%   BMI 19.05 kg/m²      The patient is a well-developed, well-nourished male in no acute distress. The patient is alert and oriented times three. The patient appears to be well groomed. Mood and affect are normal.  ORTHOPEDIC EXAM of right hip:  Inspection: No swelling, no bruising  Incision well healed  Range of motion: hip rotates without pain  ttp none  Stability: Stable  Strength: 4/5 forward flexion    XR: 2 views of the right hip show good placement of nail. Fracture in good alignment, no change from last visit. IMPRESSION:      ICD-10-CM ICD-9-CM    1. Pain in right femur M89.8X5 733.90 AMB POC XRAY, FEMUR, MIN 2 VIEWS   2. Closed displaced intertrochanteric fracture of right femur with routine healing, subsequent encounter S72.141D V54.13    3. S/P ORIF (open reduction internal fixation) fracture Z96.7 V45.89     Z87.81 V15.51         PLAN:   Pt doing well post operatively  He will continue PT at the facility he is in.  He is doing well and is WBAT with a walker  Pt not given a refill of pain medications today  RTC 4 weeks for repeat xrays and evaluation        Nikki Hitchcock [facility] and Spine Specialist

## 2019-09-24 ENCOUNTER — HOSPITAL ENCOUNTER (OUTPATIENT)
Dept: LAB | Age: 69
Discharge: HOME | End: 2019-09-24
Payer: MEDICARE

## 2019-09-24 ENCOUNTER — OFFICE VISIT (OUTPATIENT)
Dept: ORTHOPEDIC SURGERY | Facility: CLINIC | Age: 69
End: 2019-09-24

## 2019-09-24 VITALS
HEIGHT: 69 IN | BODY MASS INDEX: 19.7 KG/M2 | DIASTOLIC BLOOD PRESSURE: 80 MMHG | SYSTOLIC BLOOD PRESSURE: 144 MMHG | OXYGEN SATURATION: 97 % | WEIGHT: 133 LBS | RESPIRATION RATE: 16 BRPM | TEMPERATURE: 95.5 F | HEART RATE: 85 BPM

## 2019-09-24 DIAGNOSIS — S72.141D CLOSED DISPLACED INTERTROCHANTERIC FRACTURE OF RIGHT FEMUR WITH ROUTINE HEALING, SUBSEQUENT ENCOUNTER: Primary | ICD-10-CM

## 2019-09-24 DIAGNOSIS — Z79.01 LONG TERM (CURRENT) USE OF ANTICOAGULANTS: ICD-10-CM

## 2019-09-24 DIAGNOSIS — Z87.81 S/P ORIF (OPEN REDUCTION INTERNAL FIXATION) FRACTURE: ICD-10-CM

## 2019-09-24 DIAGNOSIS — D64.9 ANEMIA, UNSPECIFIED: ICD-10-CM

## 2019-09-24 DIAGNOSIS — Z98.890 S/P ORIF (OPEN REDUCTION INTERNAL FIXATION) FRACTURE: ICD-10-CM

## 2019-09-24 LAB
BASOPHILS # BLD: 0 K/UL (ref 0–0.1)
BASOPHILS NFR BLD: 0 % (ref 0–2)
DIFFERENTIAL METHOD BLD: ABNORMAL
EOSINOPHIL # BLD: 0.1 K/UL (ref 0–0.4)
EOSINOPHIL NFR BLD: 1 % (ref 0–5)
ERYTHROCYTE [DISTWIDTH] IN BLOOD BY AUTOMATED COUNT: 12.5 % (ref 11.6–14.5)
HCT VFR BLD AUTO: 38.9 % (ref 36–48)
HGB BLD-MCNC: 13.3 G/DL (ref 13–16)
INR PPP: 1.1 (ref 0.8–1.2)
LYMPHOCYTES # BLD: 3.2 K/UL (ref 0.9–3.6)
LYMPHOCYTES NFR BLD: 43 % (ref 21–52)
MCH RBC QN AUTO: 30.8 PG (ref 24–34)
MCHC RBC AUTO-ENTMCNC: 34.2 G/DL (ref 31–37)
MCV RBC AUTO: 90 FL (ref 74–97)
MONOCYTES # BLD: 0.3 K/UL (ref 0.05–1.2)
MONOCYTES NFR BLD: 3 % (ref 3–10)
NEUTS SEG # BLD: 3.9 K/UL (ref 1.8–8)
NEUTS SEG NFR BLD: 53 % (ref 40–73)
PLATELET # BLD AUTO: 205 K/UL (ref 135–420)
PMV BLD AUTO: 10.4 FL (ref 9.2–11.8)
PROTHROMBIN TIME: 13.4 SEC (ref 11.5–15.2)
RBC # BLD AUTO: 4.32 M/UL (ref 4.7–5.5)
WBC # BLD AUTO: 7.4 K/UL (ref 4.6–13.2)

## 2019-09-24 PROCEDURE — 85025 COMPLETE CBC W/AUTO DIFF WBC: CPT

## 2019-09-24 PROCEDURE — 85610 PROTHROMBIN TIME: CPT

## 2019-09-24 PROCEDURE — 36415 COLL VENOUS BLD VENIPUNCTURE: CPT

## 2019-09-24 NOTE — PROGRESS NOTES
1. Have you been to the ER, urgent care clinic since your last visit? Hospitalized since your last visit? Yes, [facility] ED    2. Have you seen or consulted any other health care providers outside of the [address] [place] [address] since your last visit? Include any pap smears or colon screening.  NO

## 2019-09-24 NOTE — PROGRESS NOTES
Lashawn Brown  1950     HISTORY OF PRESENT ILLNESS  Lashawn Brown is a 77 y.o. male who presents today for evaluation s/p right hip intramedullary nailing on 7/21/19. Pt rates he pain 4/10 today. He is doing well this morning. He is participating in PT and finished at this point. He has been doing well WBAT with a cane. He is out of the rehab center at this point. Patient denies any fever, chills, chest pain, shortness of breath or calf pain. There are no new illness or injuries to report since last seen in the office. PHYSICAL EXAM:   Visit Vitals  /80 (BP 1 Location: Left arm, BP Patient Position: Sitting)   Pulse 85   Temp 95.5 °F (35.3 °C) (Oral)   Resp 16   Ht 5' 9\" (1.753 m)   Wt 133 lb (60.3 kg)   SpO2 97%   BMI 19.64 kg/m²      The patient is a well-developed, well-nourished male in no acute distress. The patient is alert and oriented times three. The patient appears to be well groomed. Mood and affect are normal.  ORTHOPEDIC EXAM of right hip:  Inspection: No swelling, no bruising  Incision well healed  Range of motion: hip rotates without pain  ttp none  Stability: Stable  Strength: 5/5 forward flexion    XR: 2 views of the right hip show good placement of nail. Fracture in good alignment, callus formation noted    IMPRESSION:      ICD-10-CM ICD-9-CM    1. Closed displaced intertrochanteric fracture of right femur with routine healing, subsequent encounter S72.141D V54.13    2. S/P ORIF (open reduction internal fixation) fracture Z96.7 V45.89     Z87.81 V15.51         PLAN:   Pt doing well post operatively  He is doing well and is WBAT with a cane. Pt not given a refill of pain medications today  RTC 6 weeks for repeat xrays and evaluation at which point we will most likely release him PRN if he is still doing well.       Nikki Hitchcock [facility] and Spine Specialist

## 2019-11-01 ENCOUNTER — OFFICE VISIT (OUTPATIENT)
Dept: NEUROLOGY | Age: 69
End: 2019-11-01

## 2019-11-01 VITALS
TEMPERATURE: 97.9 F | BODY MASS INDEX: 19.55 KG/M2 | HEIGHT: 69 IN | HEART RATE: 78 BPM | DIASTOLIC BLOOD PRESSURE: 80 MMHG | SYSTOLIC BLOOD PRESSURE: 130 MMHG | WEIGHT: 132 LBS | RESPIRATION RATE: 20 BRPM

## 2019-11-01 DIAGNOSIS — G40.119 PARTIAL SYMPTOMATIC EPILEPSY WITH SIMPLE PARTIAL SEIZURES, INTRACTABLE, WITHOUT STATUS EPILEPTICUS (HCC): Primary | ICD-10-CM

## 2019-11-01 NOTE — LETTER
11/1/19 Patient: Liza Wagoner YOB: 1950 Date of Visit: 11/1/2019 Shasta Gonzalez MD 
[address] [place] [address] VIA Facsimile: [phone] Dear Shasta Gonzalez MD, Thank you for referring Mr. Liza Wagoner to [address] [place] [address] for evaluation. My notes for this consultation are attached. If you have questions, please do not hesitate to call me. I look forward to following your patient along with you.  
 
 
Sincerely, 
 
Amee Cordova MD

## 2019-11-01 NOTE — PROGRESS NOTES
David Henderson is a 68 y.o. male new patient in today to discuss seizure disorder as referred by Jose Key DO. Learning assessment previously completed 10/2/2014; primary language is [place].

## 2019-11-01 NOTE — PROGRESS NOTES
Adrian Robles is a 68 y.o., left handed male, with an established history of polio affecting the right side of his body, who comes in for evaluation and treatment of his epilepsy. He's had seizures since childhood. He has no definitve recollection of what happens to him. He apparently has a drawing up up of the right side of the body. He's becomes altered. These spells occur in an irregular basis. He's a poor historian and no one else is here to render history. The last time he remembers having a seizure was several months ago, he can't remember exactly. His spells seem to be stress induced. Social History; he's single, lives alone. He quit smoking . Doesn't drink or use illicit drugs. He worked odd jobs. Family History; mother  from unknown causes. Father  from unknown causes. Current Outpatient Medications   Medication Sig Dispense Refill    acetaminophen (TYLENOL) 650 mg TbER Take 1 Tab by mouth every four (4) hours as needed for Pain or Fever. 90 Tab 1    cyanocobalamin 1,000 mcg tablet Take 1 Tab by mouth daily. 30 Tab 0    famotidine (PEPCID) 20 mg tablet Take 1 Tab by mouth two (2) times a day. 60 Tab 0    ferrous sulfate 325 mg (65 mg iron) tablet Take 1 Tab by mouth daily (with breakfast). 30 Tab 0    latanoprost (XALATAN) 0.005 % ophthalmic solution place 1 drop into both eyes at bedtime 2.5 mL 1    carBAMazepine XR (TEGRETOL XR) 400 mg SR tablet take 1 tablet by mouth twice a day 60 Tab 11    pravastatin (PRAVACHOL) 40 mg tablet Take 1 Tab by mouth nightly. 30 Tab 11    bisacodyl (DULCOLAX) 10 mg suppository Insert 10 mg into rectum daily as needed for Other (if no bowel movement >48hrs). 10 Each 0    naloxegol (MOVANTIK) 25 mg tab tablet Take 1 Tab by mouth Daily (before breakfast).  30 Tab 0       Past Medical History:   Diagnosis Date    Glaucoma     History of poliomyelitis     Hyperlipidemia     Seizures ([facility]        Past Surgical History:   Procedure Laterality Date    COLONOSCOPY N/A 1/30/2019    COLONOSCOPY w bx's performed by Constantino Randolph MD at [address] HX APPENDECTOMY         No Known Allergies    Patient Active Problem List   Diagnosis Code    [facility] G40.909    Congenital hemiparesis ([facility] G80.8    Femur fracture, right ([facility] S72.91XA    Closed displaced intertrochanteric fracture of right femur ([facility] S72.141A    B12 deficiency E53.8         Review of Systems:   As above otherwise 11 point review of systems negative including;   Constitutional no fever or chills  Skin denies rash or itching  HENT  Denies tinnitus, hearing lose  Eyes denies diplopia vision lose  Respiratory denies shortness of breath  Cardiovascular denies chest pain, dyspnea on exertion  Gastrointestinal denies nausea, vomiting, diarrhea, constipation  Genitourinary denies incontinence  Musculoskeletal denies joint pain or swelling  Endocrine denies weight change  Hematology denies easy bruising or bleeding   Neurological as above in HPI      PHYSICAL EXAMINATION:      VITAL SIGNS:    Visit Vitals  /80 (BP 1 Location: Left arm, BP Patient Position: Sitting)   Pulse 78   Temp 97.9 °F (36.6 °C) (Oral)   Resp 20   Ht 5' 9\" (1.753 m)   Wt 59.9 kg (132 lb)   BMI 19.49 kg/m²       GENERAL: The patient is well developed, well nourished, and in no apparent distress. EXTREMITIES: No clubbing, cyanosis, or edema is identified. Pulses 2+ and symmetrical.  Muscle tone is normal.  HEAD:   Ear, nose, and throat appear to be without trauma. The patient is normocephalic. NEUROLOGIC EXAMINATION    MENTAL STATUS: The patient is awake, alert, and oriented x 4. Fund of knowledge is adequate. Speech is fluent and memory appears to be intact, both long and short term. CRANIAL NERVES: II - Visual fields are full to confrontation. Funduscopic examination reveals flat disks bilaterally. Pupils are both 4 mm and briskly reactive to light and accommodation.    III, IV, VI - Extraocular movements are intact and there is no nystagmus. V - Facial sensation is intact to pinprick and light touch. VII - Face is symmetrical.   VIII - Hearing is present. IX, X, XII- Palate rises symmetrically. Gag is present. Tongue is in the midline. XI - Shoulder shrugging and head turning intact  MOTOR:  The patient is noted to have a right hemiparesis. He has smallness of the entire right side compared to the left side. His strength is about 4/5 with increased tone and spasticity in the right arm and leg. Sensory examination is intact to pinprick, light touch and position sense testing. Reflexes are increased on the right compared to the left side. Plantars are down going. Cerebellar examination reveals no gross ataxia or dysmetria. Gait is abnormal, right hemiparetic. CBC:   Lab Results   Component Value Date/Time    WBC 7.4 09/24/2019 11:29 AM    RBC 4.32 (L) 09/24/2019 11:29 AM    HGB 13.3 09/24/2019 11:29 AM    HCT 38.9 09/24/2019 11:29 AM    PLATELET 033 12/05/9003 11:29 AM     BMP:   Lab Results   Component Value Date/Time    Glucose 94 07/29/2019 07:05 AM    Sodium 138 07/29/2019 07:05 AM    Potassium 4.0 07/29/2019 07:05 AM    Chloride 102 07/29/2019 07:05 AM    CO2 32 07/29/2019 07:05 AM    BUN 9 07/29/2019 07:05 AM    Creatinine 0.77 07/29/2019 07:05 AM    Calcium 8.9 07/29/2019 07:05 AM     CMP:   Lab Results   Component Value Date/Time    Glucose 94 07/29/2019 07:05 AM    Sodium 138 07/29/2019 07:05 AM    Potassium 4.0 07/29/2019 07:05 AM    Chloride 102 07/29/2019 07:05 AM    CO2 32 07/29/2019 07:05 AM    BUN 9 07/29/2019 07:05 AM    Creatinine 0.77 07/29/2019 07:05 AM    Calcium 8.9 07/29/2019 07:05 AM    Anion gap 4 07/29/2019 07:05 AM    BUN/Creatinine ratio 12 07/29/2019 07:05 AM    Alk.  phosphatase 84 07/29/2019 07:05 AM    Protein, total 6.0 (L) 07/29/2019 07:05 AM    Albumin 3.3 (L) 07/29/2019 07:05 AM    Globulin 2.7 07/29/2019 07:05 AM    A-G Ratio 1.2 07/29/2019 07:05 AM     Coagulation:   Lab Results   Component Value Date/Time    Prothrombin time 13.4 09/24/2019 11:29 AM    INR 1.1 09/24/2019 11:29 AM     Cardiac markers:   Lab Results   Component Value Date/Time     07/20/2019 07:16 PM    CK-MB Index  07/20/2019 07:16 PM     CALCULATION NOT PERFORMED WHEN RESULT IS BELOW LINEAR LIMIT      7/20/2019  8:12 PM - [place], Lab In [facility]     Component Value Flag Ref Range Units Status   Carbamazepine 11.6   4.0 - 12.0 ug/mL Final       Impression: Complicated patient with a combination of years right-sided hemiparesis as a consequence of poliomyelitis versus other congenital injuries. The exact etiology of his problem is not is important as what his residual is. He has residual right hemiparesis which seems to be stable. But is currently dealing with from the neurologic standpoint are complex partial seizures sometimes simple seizures on the right side of his body. Seems to be relatively well controlled on his current anticonvulsants of Tegretol XR. Plan: We will obtain a baseline EEG. Continue his current anticonvulsants. Return visit here in approximately 3 months. PLEASE NOTE:   This document has been produced using voice recognition software. Unrecognized errors in transcription may be present.

## 2019-11-12 ENCOUNTER — OFFICE VISIT (OUTPATIENT)
Dept: ORTHOPEDIC SURGERY | Facility: CLINIC | Age: 69
End: 2019-11-12

## 2019-11-12 VITALS
HEART RATE: 84 BPM | HEIGHT: 69 IN | BODY MASS INDEX: 19.26 KG/M2 | DIASTOLIC BLOOD PRESSURE: 79 MMHG | WEIGHT: 130 LBS | RESPIRATION RATE: 16 BRPM | SYSTOLIC BLOOD PRESSURE: 124 MMHG | TEMPERATURE: 95.7 F

## 2019-11-12 DIAGNOSIS — Z87.81 S/P ORIF (OPEN REDUCTION INTERNAL FIXATION) FRACTURE: ICD-10-CM

## 2019-11-12 DIAGNOSIS — S72.141D CLOSED DISPLACED INTERTROCHANTERIC FRACTURE OF RIGHT FEMUR WITH ROUTINE HEALING, SUBSEQUENT ENCOUNTER: ICD-10-CM

## 2019-11-12 DIAGNOSIS — Z98.890 S/P ORIF (OPEN REDUCTION INTERNAL FIXATION) FRACTURE: ICD-10-CM

## 2019-11-12 DIAGNOSIS — M25.551 RIGHT HIP PAIN: Primary | ICD-10-CM

## 2019-11-12 NOTE — PROGRESS NOTES
1. Have you been to the ER, urgent care clinic since your last visit? Hospitalized since your last visit? Yes, [facility] ED    2. Have you seen or consulted any other health care providers outside of the [address] [place] [address] since your last visit? Include any pap smears or colon screening.    Yes, see above

## 2019-11-12 NOTE — PROGRESS NOTES
Lamond Rinne  1950     HISTORY OF PRESENT ILLNESS  Lamond Rinne is a 68 y.o. male who presents today for evaluation s/p right hip intramedullary nailing on 7/21/19. Pt rates he pain 2/10 today. He is doing well this morning. He is participating in PT and finished at this point. He has been doing well WBAT with a cane. He has questions about riding his bike again. Patient denies any fever, chills, chest pain, shortness of breath or calf pain. There are no new illness or injuries to report since last seen in the office. PHYSICAL EXAM:   Visit Vitals  /79 (BP 1 Location: Left arm, BP Patient Position: Sitting)   Pulse 84   Temp 95.7 °F (35.4 °C) (Oral)   Resp 16   Ht 5' 9\" (1.753 m)   Wt 130 lb (59 kg)   BMI 19.20 kg/m²      The patient is a well-developed, well-nourished male in no acute distress. The patient is alert and oriented times three. The patient appears to be well groomed. Mood and affect are normal.  ORTHOPEDIC EXAM of right hip:  Inspection: No swelling, no bruising  Incision well healed  Range of motion: hip rotates without pain  ttp none  Stability: Stable  Strength: 5/5 forward flexion    XR: 2 views of the right hip show good placement of nail. Fracture in good alignment, callus formation noted    IMPRESSION:      ICD-10-CM ICD-9-CM    1. Right hip pain M25.551 719.45 AMB POC X-RAY RADEX HIP UNI WITH PELVIS 2-3 VIEWS   2. Closed displaced intertrochanteric fracture of right femur with routine healing, subsequent encounter S72.141D V54.13    3. S/P ORIF (open reduction internal fixation) fracture Z98.890 V45.89     Z87.81 V15.51         PLAN:   Pt doing well post operatively  He is doing well and is WBAT with a cane. Pt not given a refill of pain medications today  Talked about he may not be able to ride his bike outside anymore. I worry about him falling again and he does as well.   RTC 3 months for repeat xrays and evaluation at which point we will most likely release him PRN if he is still doing well.     Patient seen and evaluated by Dr. Larry Polanco today who agrees with treatment plan    Nikki Keita [facility] and Spine Specialist

## 2020-01-06 ENCOUNTER — HOSPITAL ENCOUNTER (OUTPATIENT)
Dept: NEUROLOGY | Age: 70
Discharge: HOME | End: 2020-01-06
Attending: PSYCHIATRY & NEUROLOGY
Payer: MEDICARE

## 2020-01-06 DIAGNOSIS — G40.119 PARTIAL SYMPTOMATIC EPILEPSY WITH SIMPLE PARTIAL SEIZURES, INTRACTABLE, WITHOUT STATUS EPILEPTICUS (HCC): ICD-10-CM

## 2020-01-06 PROCEDURE — 95819 EEG AWAKE AND ASLEEP: CPT

## 2020-01-07 NOTE — PROCEDURES
[facility]  EEG    Name:  Greg Carlton  MR#:   [id number]  :  1950  ACCOUNT #:  [de-identified]  DATE OF SERVICE:  2020    REFERRING PROVIDER:  Danyelle Pérez. Huan Manley MD    EEG Number:  [facility] 20-13. HISTORY:  A 69-year-old male with history of polio and history of seizures with episodes of drawing on the right side of his body. MEDICATIONS:  Tylenol, Pepcid, iron sulfate, Tegretol, Pravachol, Movantik. EEG REPORT:  This is a 16-channel routine EEG done using the International 10-20 electrode placement system. The predominant background consists of a low amplitude, difficult to discern 6 Hz symmetric rhythm, which appears to be present throughout the recording without any other additional rhythms. Electroencephalographic sleep was not observed. There were no abnormal photoparoxysmal responses. The patient could not cooperate for hyperventilation. No epileptiform abnormalities whether generalized or focal were seen and no evidence of focal slowing was observed. IMPRESSION:  This EEG shows mild slowing, which could be consistent with an encephalopathy of chronic or acute timeframe. Clinical correlation is necessary.       Becki Mcneal MD      [id number]  D:  2020 15:44  T:  2020 21:32  JOB #:  [id number]

## 2020-04-14 ENCOUNTER — TELEPHONE (OUTPATIENT)
Dept: NEUROLOGY | Age: 70
End: 2020-04-14

## 2020-04-14 NOTE — TELEPHONE ENCOUNTER
Pt's daughter Christie West states she was advised by Dr. Bubba Benavides office to reach out to Dr. Mook Quintero for a seizure medication prescription. Please advise.

## 2020-04-14 NOTE — TELEPHONE ENCOUNTER
Spoke with daughter regarding medication and follow-up. Medication is not listed in the patient's chart. Requested that she have pharmacy contact us with the dosage.

## 2020-04-16 DIAGNOSIS — R56.9 CONVULSIONS, UNSPECIFIED CONVULSION TYPE (HCC): ICD-10-CM

## 2020-04-16 RX ORDER — CARBAMAZEPINE 400 MG/1
TABLET, EXTENDED RELEASE ORAL
Qty: 60 TAB | Refills: 11 | Status: SHIPPED | OUTPATIENT
Start: 2020-04-16 | End: 2020-07-23 | Stop reason: SDUPTHER

## 2020-04-16 NOTE — TELEPHONE ENCOUNTER
Requested Prescriptions     Pending Prescriptions Disp Refills    carBAMazepine XR (TEGretol XR) 400 mg SR tablet 60 Tab 11     Sig: take 1 tablet by mouth twice a day     Faxed script scanned to chart

## 2020-07-23 DIAGNOSIS — R56.9 CONVULSIONS, UNSPECIFIED CONVULSION TYPE (HCC): ICD-10-CM

## 2020-07-23 RX ORDER — CARBAMAZEPINE 400 MG/1
TABLET, EXTENDED RELEASE ORAL
Qty: 60 TAB | Refills: 11 | Status: SHIPPED | OUTPATIENT
Start: 2020-07-23 | End: 2021-08-20 | Stop reason: SDUPTHER

## 2020-10-06 ENCOUNTER — TRANSCRIBE ORDER (OUTPATIENT)
Dept: SCHEDULING | Age: 70
End: 2020-10-06

## 2020-10-06 DIAGNOSIS — J44.9 COPD, SEVERE (HCC): Primary | ICD-10-CM

## 2021-02-11 ENCOUNTER — TELEPHONE (OUTPATIENT)
Dept: NEUROLOGY | Age: 71
End: 2021-02-11

## 2021-02-11 DIAGNOSIS — R56.9 CONVULSIONS, UNSPECIFIED CONVULSION TYPE (HCC): ICD-10-CM

## 2021-02-11 RX ORDER — CARBAMAZEPINE 400 MG/1
400 TABLET, EXTENDED RELEASE ORAL 2 TIMES DAILY
Qty: 180 TAB | Refills: 0 | OUTPATIENT
Start: 2021-02-11 | End: 2021-05-12

## 2021-08-20 ENCOUNTER — TELEPHONE (OUTPATIENT)
Dept: NEUROLOGY | Age: 71
End: 2021-08-20

## 2021-08-20 DIAGNOSIS — R56.9 CONVULSIONS, UNSPECIFIED CONVULSION TYPE (HCC): ICD-10-CM

## 2021-08-20 RX ORDER — CARBAMAZEPINE 400 MG/1
TABLET, EXTENDED RELEASE ORAL
Qty: 60 TABLET | Refills: 11 | Status: CANCELLED | OUTPATIENT
Start: 2021-08-20

## 2021-08-20 RX ORDER — CARBAMAZEPINE 400 MG/1
TABLET, EXTENDED RELEASE ORAL
Qty: 60 TABLET | Refills: 0 | Status: SHIPPED | OUTPATIENT
Start: 2021-08-20 | End: 2021-09-17 | Stop reason: SDUPTHER

## 2021-08-20 NOTE — TELEPHONE ENCOUNTER
Patient niece called and said they have no more refills on their Tegratol. Patient is out of medication and niece is worried they will need to go to ER. Patient is a one time Dr. Robert Mccarthy patient. I have placed them with MELINA Figueredo for a NTP medication follow up. Please be advised.

## 2021-08-20 NOTE — TELEPHONE ENCOUNTER
Requested Prescriptions     Pending Prescriptions Disp Refills    carBAMazepine XR (TEGretol XR) 400 mg SR tablet 60 Tablet 11     Sig: take 1 tablet by mouth twice a day     According to patients niece they are out of medication. I have scheduled a NTP appointment with MELINA Figueredo.  They are a one time Dr. Cadence Swanson pt

## 2021-09-16 ENCOUNTER — TELEPHONE (OUTPATIENT)
Dept: NEUROLOGY | Age: 71
End: 2021-09-16

## 2021-09-16 NOTE — TELEPHONE ENCOUNTER
Patients appointment on the 20th had to be rescheduled. Patient will run out of medication on the 20th. I have scheduled the patient for the 22nd. Patient would like us to consider giving a refill.

## 2021-09-17 DIAGNOSIS — R56.9 CONVULSIONS, UNSPECIFIED CONVULSION TYPE (HCC): ICD-10-CM

## 2021-09-17 RX ORDER — CARBAMAZEPINE 400 MG/1
TABLET, EXTENDED RELEASE ORAL
Qty: 14 TABLET | Refills: 0 | Status: SHIPPED | OUTPATIENT
Start: 2021-09-17 | End: 2021-09-22 | Stop reason: SDUPTHER

## 2021-09-17 NOTE — TELEPHONE ENCOUNTER
Spoke with patient's caregiver, Arben Duenas, verified name and , made aware unable to refill order per NP, [address]. Caregiver request another Neurologist possible may take request to be seen sooner. Area neurologist referred to caregiver.

## 2021-09-22 ENCOUNTER — OFFICE VISIT (OUTPATIENT)
Dept: NEUROLOGY | Age: 71
End: 2021-09-22
Payer: MEDICARE

## 2021-09-22 VITALS
RESPIRATION RATE: 18 BRPM | HEART RATE: 81 BPM | SYSTOLIC BLOOD PRESSURE: 130 MMHG | HEIGHT: 69 IN | OXYGEN SATURATION: 99 % | DIASTOLIC BLOOD PRESSURE: 88 MMHG | WEIGHT: 133 LBS | BODY MASS INDEX: 19.7 KG/M2

## 2021-09-22 DIAGNOSIS — R56.9 CONVULSIONS, UNSPECIFIED CONVULSION TYPE (HCC): ICD-10-CM

## 2021-09-22 DIAGNOSIS — Z51.81 MEDICATION MONITORING ENCOUNTER: Primary | ICD-10-CM

## 2021-09-22 DIAGNOSIS — G81.91 RIGHT HEMIPARESIS (HCC): ICD-10-CM

## 2021-09-22 PROCEDURE — G0463 HOSPITAL OUTPT CLINIC VISIT: HCPCS | Performed by: NURSE PRACTITIONER

## 2021-09-22 PROCEDURE — 99214 OFFICE O/P EST MOD 30 MIN: CPT | Performed by: NURSE PRACTITIONER

## 2021-09-22 RX ORDER — CARBAMAZEPINE 400 MG/1
TABLET, EXTENDED RELEASE ORAL
Qty: 60 TABLET | Refills: 5 | Status: SHIPPED | OUTPATIENT
Start: 2021-09-22 | End: 2022-04-18 | Stop reason: SDUPTHER

## 2021-09-22 NOTE — PROGRESS NOTES
[facility]  [address], [address], [place], [address]  [address] Rafal Cummings, [address]  Office:  [phone]  Fax: [phone]  Chief Complaint   Patient presents with    Seizure     follow up     This is a 73-year-old male who presents for follow-up seizures. He is accompanied if office today by his niece. Last seen in 2019 by Dr. Arnold Castro. In the interim endorses last seizure was undetermined amount of time ago. He said they are controlled. He gets a warning sign of his right eye twitching and will go lie down before seizure comes on. His niece said she has not witnessed any recent seizures. Said the last seizure she witnessed was back when she was 9years old. He denies waking up on the floor unsure how he got there, biting his tongue, or losing his water in the middle the night. Significant history of polio with right-sided hemiparesis. He had a EEG complete in 2020 that was unremarkable for epileptiform. He has been maintained on carbamazepine  mg twice daily. Denies missed doses. Requesting refills. Tolerating this medication with no concerns. Denies weakness, headaches, abnormal involuntary movements, confusion, falls, or any additional concerns at this time. Past Medical History:   Diagnosis Date    Glaucoma     History of poliomyelitis     Hyperlipidemia     Seizures ([facility]        Past Surgical History:   Procedure Laterality Date    COLONOSCOPY N/A 1/30/2019    COLONOSCOPY w bx's performed by Martha Harper MD at [address] [place] [address] HX APPENDECTOMY         Current Outpatient Medications   Medication Sig Dispense Refill    carBAMazepine XR (TEGretol XR) 400 mg SR tablet take 1 tablet by mouth twice a day 60 Tablet 5    acetaminophen (TYLENOL) 650 mg TbER Take 1 Tab by mouth every four (4) hours as needed for Pain or Fever. 90 Tab 1    cyanocobalamin 1,000 mcg tablet Take 1 Tab by mouth daily.  30 Tab 0    famotidine (PEPCID) 20 mg tablet Take 1 Tab by mouth two (2) times a day. 60 Tab 0    ferrous sulfate 325 mg (65 mg iron) tablet Take 1 Tab by mouth daily (with breakfast). 30 Tab 0    latanoprost (XALATAN) 0.005 % ophthalmic solution place 1 drop into both eyes at bedtime 2.5 mL 1    pravastatin (PRAVACHOL) 40 mg tablet Take 1 Tab by mouth nightly. 30 Tab 11    bisacodyl (DULCOLAX) 10 mg suppository Insert 10 mg into rectum daily as needed for Other (if no bowel movement >48hrs). (Patient not taking: Reported on 2021) 10 Each 0    naloxegol (MOVANTIK) 25 mg tab tablet Take 1 Tab by mouth Daily (before breakfast). (Patient not taking: Reported on 2021) 30 Tab 0        No Known Allergies    Social History     Tobacco Use    Smoking status: Former Smoker     Types: Cigarettes     Quit date: 2016     Years since quittin.3    Smokeless tobacco: Former User     Quit date: 2016   Substance Use Topics    Alcohol use: No    Drug use: No       Family History   Family history unknown: Yes       Review of Systems  GENERAL: Denies fever or fatigue  CARDIAC: No CP or SOB  PULMONARY: No cough of SOB  MUSCULOSKELETAL: No new joint pain  NEURO: SEE HPI    Examination  Visit Vitals  /88   Pulse 81   Resp 18   Ht 5' 9\" (1.753 m)   Wt 60.3 kg (133 lb)   SpO2 99%   BMI 19.64 kg/m²       This is a very pleasant 78-year-old male. He is alert and in no apparent distress. Full fund of knowledge. Speech is clear. No facial asymmetry. Tongue midline. Asymmetric shoulder shrug with weakness noted to the right side. Right side underdeveloped and significant right side hemiparesis. Reflexes are hyperreflexive on right side compared to left side. Dysmetria on right upper extremity. Gait is abnormal, right hemiparetic. He uses a cane for assistive device.     Impression/Plan  King Landeros is a 65 y.o. male whose history and physical are consistent with complex partial seizures and history of poliomyelitis versus congenital injuries with significant right hemiparesis. Endorses seizures are stable. Has been maintained on carbamazepine  mg twice daily. We will order serum monitoring labs to evaluate for any low sodium or abnormality with white blood cell count. Refills provided for 6 months. Encouraged him to maintain routine follow-ups. Follow-up in 6 months or sooner as need be. All questions addressed and patient and patient's family member are agreeable with plan of care.     Diagnoses and all orders for this visit:    1. Medication monitoring encounter  -     CARBAMAZEPINE; Future  -     METABOLIC PANEL, COMPREHENSIVE; Future  -     CBC WITH AUTOMATED DIFF; Future    2. Convulsions, unspecified convulsion type ([facility]  -     CARBAMAZEPINE; Future  -     METABOLIC PANEL, COMPREHENSIVE; Future  -     CBC WITH AUTOMATED DIFF; Future  -     carBAMazepine XR (TEGretol XR) 400 mg SR tablet; take 1 tablet by mouth twice a day    3. Right hemiparesis ([facility]      Signed By: Shauna Biswas NP        PLEASE NOTE:   Portions of this document may have been produced using voice recognition software. Unrecognized errors in transcription may be present.

## 2021-09-22 NOTE — PROGRESS NOTES
Pennie Armstrong presents today for   Chief Complaint   Patient presents with    Seizure     follow up       Is someone accompanying this pt? Yes, niece     Is the patient using any DME equipment during [address]? no    Depression Screening:  3 most recent PHQ Screens 11/12/2019   PHQ Not Done -   Little interest or pleasure in doing things Not at all   Feeling down, depressed, irritable, or hopeless Not at all   Total Score PHQ 2 0       Learning Assessment:  Learning Assessment 10/2/2014   PRIMARY LEARNER Patient   HIGHEST LEVEL OF EDUCATION - PRIMARY LEARNER  DID NOT GRADUATE HIGH SCHOOL   BARRIERS PRIMARY LEARNER NONE   CO-LEARNER CAREGIVER No   PRIMARY LANGUAGE ENGLISH   LEARNER PREFERENCE PRIMARY LISTENING   ANSWERED BY patient   RELATIONSHIP SELF       Abuse Screening:  No flowsheet data found. Fall Risk  Fall Risk Assessment, last 12 mths 9/22/2021   Able to walk? Yes   Fall in past 12 months? 0   Do you feel unsteady? 0   Are you worried about falling 0   Number of falls in past 12 months -   Fall with injury? -         Coordination of Care:  1. Have you been to the ER, urgent care clinic since your last visit? Hospitalized since your last visit? no    2. Have you seen or consulted any other health care providers outside of the [address] [place] [address] since your last visit? Include any pap smears or colon screening.  No

## 2021-10-21 ENCOUNTER — APPOINTMENT (OUTPATIENT)
Dept: CT IMAGING | Age: 71
End: 2021-10-21
Attending: EMERGENCY MEDICINE
Payer: MEDICARE

## 2021-10-21 ENCOUNTER — HOSPITAL ENCOUNTER (OUTPATIENT)
Age: 71
Setting detail: OBSERVATION
Discharge: HOME HEALTH | End: 2021-10-23
Attending: EMERGENCY MEDICINE | Admitting: FAMILY MEDICINE
Payer: MEDICARE

## 2021-10-21 DIAGNOSIS — Z86.69 HISTORY OF EPILEPSY: ICD-10-CM

## 2021-10-21 DIAGNOSIS — R26.81 UNSTEADINESS ON FEET: Primary | ICD-10-CM

## 2021-10-21 LAB
ALBUMIN SERPL-MCNC: 3.9 G/DL (ref 3.4–5)
ALBUMIN/GLOB SERPL: 1.4 {RATIO} (ref 0.8–1.7)
ALP SERPL-CCNC: 87 U/L (ref 45–117)
ALT SERPL-CCNC: 19 U/L (ref 16–61)
ANION GAP SERPL CALC-SCNC: 5 MMOL/L (ref 3–18)
AST SERPL-CCNC: 13 U/L (ref 10–38)
BASOPHILS # BLD: 0 K/UL (ref 0–0.1)
BASOPHILS NFR BLD: 0 % (ref 0–2)
BILIRUB SERPL-MCNC: 0.3 MG/DL (ref 0.2–1)
BUN SERPL-MCNC: 20 MG/DL (ref 7–18)
BUN/CREAT SERPL: 22 (ref 12–20)
CALCIUM SERPL-MCNC: 8.8 MG/DL (ref 8.5–10.1)
CARBAMAZEPINE SERPL-MCNC: 15.2 UG/ML (ref 4–12)
CHLORIDE SERPL-SCNC: 108 MMOL/L (ref 100–111)
CO2 SERPL-SCNC: 28 MMOL/L (ref 21–32)
CREAT SERPL-MCNC: 0.91 MG/DL (ref 0.6–1.3)
DIFFERENTIAL METHOD BLD: NORMAL
EOSINOPHIL # BLD: 0 K/UL (ref 0–0.4)
EOSINOPHIL NFR BLD: 0 % (ref 0–5)
ERYTHROCYTE [DISTWIDTH] IN BLOOD BY AUTOMATED COUNT: 12.2 % (ref 11.6–14.5)
GLOBULIN SER CALC-MCNC: 2.8 G/DL (ref 2–4)
GLUCOSE SERPL-MCNC: 99 MG/DL (ref 74–99)
HCT VFR BLD AUTO: 41.8 % (ref 36–48)
HGB BLD-MCNC: 13.9 G/DL (ref 13–16)
LYMPHOCYTES # BLD: 2.5 K/UL (ref 0.9–3.6)
LYMPHOCYTES NFR BLD: 33 % (ref 21–52)
MCH RBC QN AUTO: 30.8 PG (ref 24–34)
MCHC RBC AUTO-ENTMCNC: 33.3 G/DL (ref 31–37)
MCV RBC AUTO: 92.5 FL (ref 78–100)
MONOCYTES # BLD: 0.3 K/UL (ref 0.05–1.2)
MONOCYTES NFR BLD: 4 % (ref 3–10)
NEUTS SEG # BLD: 4.6 K/UL (ref 1.8–8)
NEUTS SEG NFR BLD: 62 % (ref 40–73)
PLATELET # BLD AUTO: 189 K/UL (ref 135–420)
PMV BLD AUTO: 10.3 FL (ref 9.2–11.8)
POTASSIUM SERPL-SCNC: 4 MMOL/L (ref 3.5–5.5)
PROT SERPL-MCNC: 6.7 G/DL (ref 6.4–8.2)
RBC # BLD AUTO: 4.52 M/UL (ref 4.35–5.65)
SODIUM SERPL-SCNC: 141 MMOL/L (ref 136–145)
TROPONIN I SERPL-MCNC: <0.02 NG/ML (ref 0–0.04)
WBC # BLD AUTO: 7.4 K/UL (ref 4.6–13.2)

## 2021-10-21 PROCEDURE — 99285 EMERGENCY DEPT VISIT HI MDM: CPT

## 2021-10-21 PROCEDURE — 84484 ASSAY OF TROPONIN QUANT: CPT

## 2021-10-21 PROCEDURE — 96361 HYDRATE IV INFUSION ADD-ON: CPT

## 2021-10-21 PROCEDURE — 70450 CT HEAD/BRAIN W/O DYE: CPT

## 2021-10-21 PROCEDURE — 96360 HYDRATION IV INFUSION INIT: CPT

## 2021-10-21 PROCEDURE — 74011250636 HC RX REV CODE- 250/636: Performed by: GENERAL PRACTICE

## 2021-10-21 PROCEDURE — 80053 COMPREHEN METABOLIC PANEL: CPT

## 2021-10-21 PROCEDURE — 85025 COMPLETE CBC W/AUTO DIFF WBC: CPT

## 2021-10-21 PROCEDURE — 80156 ASSAY CARBAMAZEPINE TOTAL: CPT

## 2021-10-21 PROCEDURE — 93005 ELECTROCARDIOGRAM TRACING: CPT

## 2021-10-21 RX ADMIN — SODIUM CHLORIDE 1000 ML: 900 INJECTION, SOLUTION INTRAVENOUS at 20:38

## 2021-10-21 NOTE — ED NOTES
I performed a brief evaluation, including history and physical, of the patient here in triage and I have determined that pt will need further treatment and evaluation from the main side ER physician. I have placed initial orders to help in expediting patients care. October 21, 2021 at 7:24 PM - CORIE Troy        There were no vitals taken for this visit.

## 2021-10-21 NOTE — ED TRIAGE NOTES
Dizziness 30 minutes prior, after second dose of tegretol. Patient states \"feels like I'm going to pass out\".

## 2021-10-22 PROBLEM — T42.1X1A: Status: ACTIVE | Noted: 2021-10-22

## 2021-10-22 LAB
AMPHET UR QL SCN: NEGATIVE
APAP SERPL-MCNC: <2 UG/ML (ref 10–30)
APPEARANCE UR: CLEAR
ATRIAL RATE: 82 BPM
BACTERIA URNS QL MICRO: ABNORMAL /HPF
BARBITURATES UR QL SCN: NEGATIVE
BENZODIAZ UR QL: NEGATIVE
BILIRUB UR QL: NEGATIVE
CALCULATED P AXIS, ECG09: 71 DEGREES
CALCULATED R AXIS, ECG10: -44 DEGREES
CALCULATED T AXIS, ECG11: 58 DEGREES
CANNABINOIDS UR QL SCN: NEGATIVE
CARBAMAZEPINE SERPL-MCNC: 13.3 UG/ML (ref 4–12)
CARBAMAZEPINE SERPL-MCNC: 15.2 UG/ML (ref 4–12)
CARBAMAZEPINE SERPL-MCNC: 16.5 UG/ML (ref 4–12)
CHOLEST SERPL-MCNC: 231 MG/DL
CK MB CFR SERPL CALC: NORMAL % (ref 0–4)
CK MB CFR SERPL CALC: NORMAL % (ref 0–4)
CK MB SERPL-MCNC: <1 NG/ML (ref 5–25)
CK MB SERPL-MCNC: <1 NG/ML (ref 5–25)
CK SERPL-CCNC: 97 U/L (ref 39–308)
CK SERPL-CCNC: 99 U/L (ref 39–308)
COCAINE UR QL SCN: NEGATIVE
COLOR UR: YELLOW
DIAGNOSIS, 93000: NORMAL
FOLATE SERPL-MCNC: 14.1 NG/ML (ref 3.1–17.5)
GLUCOSE UR STRIP.AUTO-MCNC: NEGATIVE MG/DL
HDLC SERPL-MCNC: 122 MG/DL (ref 40–60)
HDLC SERPL: 1.9 {RATIO} (ref 0–5)
HDSCOM,HDSCOM: NORMAL
HGB UR QL STRIP: ABNORMAL
IRON SATN MFR SERPL: 40 % (ref 20–50)
IRON SERPL-MCNC: 100 UG/DL (ref 50–175)
KETONES UR QL STRIP.AUTO: NEGATIVE MG/DL
LDLC SERPL CALC-MCNC: 95.8 MG/DL (ref 0–100)
LEUKOCYTE ESTERASE UR QL STRIP.AUTO: NEGATIVE
LIPID PROFILE,FLP: ABNORMAL
METHADONE UR QL: NEGATIVE
NITRITE UR QL STRIP.AUTO: NEGATIVE
OPIATES UR QL: NEGATIVE
P-R INTERVAL, ECG05: 166 MS
PCP UR QL: NEGATIVE
PH UR STRIP: 6 [PH] (ref 5–8)
PROT UR STRIP-MCNC: NEGATIVE MG/DL
Q-T INTERVAL, ECG07: 350 MS
QRS DURATION, ECG06: 76 MS
QTC CALCULATION (BEZET), ECG08: 408 MS
RBC #/AREA URNS HPF: ABNORMAL /HPF (ref 0–5)
SALICYLATES SERPL-MCNC: 2.4 MG/DL (ref 2.8–20)
SP GR UR REFRACTOMETRY: 1.01 (ref 1–1.03)
TIBC SERPL-MCNC: 247 UG/DL (ref 250–450)
TRIGL SERPL-MCNC: 66 MG/DL (ref ?–150)
TROPONIN I SERPL-MCNC: <0.02 NG/ML (ref 0–0.04)
TROPONIN I SERPL-MCNC: <0.02 NG/ML (ref 0–0.04)
UROBILINOGEN UR QL STRIP.AUTO: 0.2 EU/DL (ref 0.2–1)
VENTRICULAR RATE, ECG03: 82 BPM
VIT B12 SERPL-MCNC: 473 PG/ML (ref 211–911)
VLDLC SERPL CALC-MCNC: 13.2 MG/DL
WBC URNS QL MICRO: ABNORMAL /HPF (ref 0–4)

## 2021-10-22 PROCEDURE — 80143 DRUG ASSAY ACETAMINOPHEN: CPT

## 2021-10-22 PROCEDURE — 80307 DRUG TEST PRSMV CHEM ANLYZR: CPT

## 2021-10-22 PROCEDURE — 92610 EVALUATE SWALLOWING FUNCTION: CPT

## 2021-10-22 PROCEDURE — 80156 ASSAY CARBAMAZEPINE TOTAL: CPT

## 2021-10-22 PROCEDURE — 74011250636 HC RX REV CODE- 250/636: Performed by: FAMILY MEDICINE

## 2021-10-22 PROCEDURE — 80179 DRUG ASSAY SALICYLATE: CPT

## 2021-10-22 PROCEDURE — 74011250636 HC RX REV CODE- 250/636: Performed by: PSYCHIATRY & NEUROLOGY

## 2021-10-22 PROCEDURE — 65660000000 HC RM CCU STEPDOWN

## 2021-10-22 PROCEDURE — 99222 1ST HOSP IP/OBS MODERATE 55: CPT | Performed by: PSYCHIATRY & NEUROLOGY

## 2021-10-22 PROCEDURE — 74011250637 HC RX REV CODE- 250/637: Performed by: FAMILY MEDICINE

## 2021-10-22 PROCEDURE — 74011000250 HC RX REV CODE- 250: Performed by: FAMILY MEDICINE

## 2021-10-22 PROCEDURE — 82746 ASSAY OF FOLIC ACID SERUM: CPT

## 2021-10-22 PROCEDURE — 92526 ORAL FUNCTION THERAPY: CPT

## 2021-10-22 PROCEDURE — 80061 LIPID PANEL: CPT

## 2021-10-22 PROCEDURE — 99218 HC RM OBSERVATION: CPT

## 2021-10-22 PROCEDURE — 82553 CREATINE MB FRACTION: CPT

## 2021-10-22 PROCEDURE — 83550 IRON BINDING TEST: CPT

## 2021-10-22 PROCEDURE — 96372 THER/PROPH/DIAG INJ SC/IM: CPT

## 2021-10-22 PROCEDURE — 81001 URINALYSIS AUTO W/SCOPE: CPT

## 2021-10-22 RX ORDER — LATANOPROST 50 UG/ML
1 SOLUTION/ DROPS OPHTHALMIC DAILY
Status: DISCONTINUED | OUTPATIENT
Start: 2021-10-22 | End: 2021-10-23

## 2021-10-22 RX ORDER — LANOLIN ALCOHOL/MO/W.PET/CERES
325 CREAM (GRAM) TOPICAL
Status: DISCONTINUED | OUTPATIENT
Start: 2021-10-22 | End: 2021-10-23 | Stop reason: HOSPADM

## 2021-10-22 RX ORDER — PRAVASTATIN SODIUM 20 MG/1
10 TABLET ORAL
Status: DISCONTINUED | OUTPATIENT
Start: 2021-10-22 | End: 2021-10-23 | Stop reason: HOSPADM

## 2021-10-22 RX ORDER — ACETAMINOPHEN 500 MG
500 TABLET ORAL
Status: DISCONTINUED | OUTPATIENT
Start: 2021-10-22 | End: 2021-10-23 | Stop reason: HOSPADM

## 2021-10-22 RX ORDER — ENOXAPARIN SODIUM 100 MG/ML
40 INJECTION SUBCUTANEOUS EVERY 24 HOURS
Status: DISCONTINUED | OUTPATIENT
Start: 2021-10-22 | End: 2021-10-23 | Stop reason: HOSPADM

## 2021-10-22 RX ORDER — SODIUM CHLORIDE, SODIUM LACTATE, POTASSIUM CHLORIDE, CALCIUM CHLORIDE 600; 310; 30; 20 MG/100ML; MG/100ML; MG/100ML; MG/100ML
75 INJECTION, SOLUTION INTRAVENOUS CONTINUOUS
Status: DISCONTINUED | OUTPATIENT
Start: 2021-10-22 | End: 2021-10-22

## 2021-10-22 RX ORDER — FACIAL-BODY WIPES
10 EACH TOPICAL
Status: DISCONTINUED | OUTPATIENT
Start: 2021-10-22 | End: 2021-10-23 | Stop reason: HOSPADM

## 2021-10-22 RX ORDER — LANOLIN ALCOHOL/MO/W.PET/CERES
1000 CREAM (GRAM) TOPICAL DAILY
Status: DISCONTINUED | OUTPATIENT
Start: 2021-10-22 | End: 2021-10-23 | Stop reason: HOSPADM

## 2021-10-22 RX ORDER — FAMOTIDINE 20 MG/1
20 TABLET, FILM COATED ORAL 2 TIMES DAILY
Status: DISCONTINUED | OUTPATIENT
Start: 2021-10-22 | End: 2021-10-23 | Stop reason: HOSPADM

## 2021-10-22 RX ORDER — SODIUM CHLORIDE 9 MG/ML
250 INJECTION, SOLUTION INTRAVENOUS CONTINUOUS
Status: DISPENSED | OUTPATIENT
Start: 2021-10-22 | End: 2021-10-22

## 2021-10-22 RX ADMIN — FAMOTIDINE 20 MG: 20 TABLET ORAL at 18:21

## 2021-10-22 RX ADMIN — LATANOPROST 1 DROP: 50 SOLUTION OPHTHALMIC at 09:00

## 2021-10-22 RX ADMIN — CYANOCOBALAMIN TAB 1000 MCG 1000 MCG: 1000 TAB at 09:38

## 2021-10-22 RX ADMIN — SODIUM CHLORIDE 250 ML/HR: 900 INJECTION, SOLUTION INTRAVENOUS at 11:45

## 2021-10-22 RX ADMIN — ACETAMINOPHEN 500 MG: 500 TABLET ORAL at 19:31

## 2021-10-22 RX ADMIN — SODIUM CHLORIDE, SODIUM LACTATE, POTASSIUM CHLORIDE, AND CALCIUM CHLORIDE 75 ML/HR: 600; 310; 30; 20 INJECTION, SOLUTION INTRAVENOUS at 06:58

## 2021-10-22 RX ADMIN — PRAVASTATIN SODIUM 10 MG: 20 TABLET ORAL at 22:50

## 2021-10-22 RX ADMIN — FAMOTIDINE 20 MG: 20 TABLET ORAL at 09:39

## 2021-10-22 RX ADMIN — ENOXAPARIN SODIUM 40 MG: 100 INJECTION SUBCUTANEOUS at 07:00

## 2021-10-22 RX ADMIN — FERROUS SULFATE TAB 325 MG (65 MG ELEMENTAL FE) 325 MG: 325 (65 FE) TAB at 09:39

## 2021-10-22 NOTE — ED NOTES
Purposeful rounding completed:    Side rails up x 2:  YES  Bed in low position and wheels locked: YES  Call bell within reach: YES  Comfort addressed: YES    Toileting needs addressed: YES  Plan of care reviewed/updated with patient and or family members: YES  IV site assessed: YES  Pain assessed and addressed: YES, 0    Patient is laying on bed quietly. Skin is warm and dry to touch. No distress observed.

## 2021-10-22 NOTE — ED NOTES
Patient care assumed from Dr. Chanelle Ramirez, and Dr. Rodriguez Lopez, emergency medicine resident PGY 4.    Briefly 75-year-old male with history of epilepsy presenting to the emergency department for evaluation of unsteadiness of gait and feeling of being \"drunk\". This happened after he took his nightly Tegretol. He is asymptomatic on exam.  He has an overall nonfocal neurological exam.  Blood work obtained today is grossly unremarkable. Pending head CT at time of signout. 0021:  Since head CT negative for acute intracranial abnormality. Patient's Tegretol level is elevated at 15.2. Discussed case with poison control who recommended trending Tegretol level every 4 hours and continuing to monitor patient. Recommend that he be admitted for least 12 hours. We will add on  salicylate and acetaminophen levels. Patient will receive IV fluids.

## 2021-10-22 NOTE — ED NOTES
Purposeful rounding completed:    Side rails up x 2:  YES  Bed in low position and wheels locked: YES  Call bell within reach: YES  Comfort addressed: YES    Toileting needs addressed: YES  Plan of care reviewed/updated with patient and or family members: YES  IV site assessed: YES  Pain assessed and addressed: YES, 0    Sitting on side of bed. Patient is quiet. No distress observed.

## 2021-10-22 NOTE — ED NOTES
Poison controllled called and closed client case. Confirmed 2 consecutive downward trends on tegretol level and negative UDS.

## 2021-10-22 NOTE — PROGRESS NOTES
Problem: Dysphagia (Adult)  Goal: *Acute Goals and Plan of Care (Insert Text)  Description: Patient will:  1. Tolerate PO trials with 0 s/s overt distress in 4/5 trials  2. Utilize compensatory swallow strategies/maneuvers (decrease bite/sip, size/rate, alt. liq/sol) with min cues in 4/5 trials  3. Perform oral-motor/laryngeal exercises to increase oropharyngeal swallow function with min cues     Rec:     Minced/moist with thin liquids  Aspiration precautions  HOB >45 during po intake, remain >30 for 30-45 minutes after po   Small bites/sips; alternate liquid/solid with slow feeding rate   Oral care TID  Meds per  whole with liquid wash      Outcome: Progressing Towards Goal     SPEECH LANGUAGE PATHOLOGY BEDSIDE SWALLOW   EVALUATION & TREATMENT     Patient: Carlo Gross (37 y.o. male)  Date: 10/22/2021  Primary Diagnosis: Tegretol toxicity, accidental or unintentional, initial encounter [T42.1X1A]        Precautions: Aspiration  PLOF: Per H&P    ASSESSMENT :  Based on the objective data described below, the patient presents with mild-moderate oral dysphagia d/t lack of dentition. Chart Review and clearance obtained by RN. Today, SLP conducted a Clinical Beside Swallow Evaluation, per MD orders. Pt A&Ox3 and able to follow commands. Pt reports no hx of dysphagia, but in the process of getting dental work done; currently edentulous. Pt reports gums are sensitive and not eating much that he needs to chew; \"like just grounded hamburger meat\". Speech/voice WNLS. Oral mech examination revealed structures functional for speech and deglutition. Pt observed with thin liquids +/- straw via single sips and successive swallows with timely swallow initiation, adequate laryngeal elevation to palpation and no overt s/sx aspiration. Pt demo'd acceptance of puree with timely oral prep. Labored mastication with pt c/o'ing of discomfort with solids; no overt s/sx of aspiration witnessed.  ST will f/u as appropriate for diet safety/QOL, but may be pt's baseline. SLP RECS: Minced/moist diet with thin liquids, meds whole with liquid wash with oral care, TID, and standard aspiration precautions. ST will continue to follow. Pt educated with regard to s/sx aspiration, aspiration risk, diet recs and role of SLP. Pt able to verbalize understanding. D/w RN. TREATMENT :  Skilled therapy initiated; Educated pt on aspiration precautions and importance of compensatory swallow techniques to decrease aspiration risk (decrease rate of intake & sip/bite size, upright @HOB for all po intake and ~30 minutes after po); verbalized comprehension. SLP to follow as indicated. Patient will benefit from skilled intervention to address the above impairments. Patient's rehabilitation potential is considered to be Fair  Factors which may influence rehabilitation potential include:  []            None noted  []            Mental ability/status  []            Medical condition  []            Home/family situation and support systems  []            Safety awareness  []            Pain tolerance/management  [x]            Other: lack of dentition     PLAN :  Recommendations and Planned Interventions:  See above  Frequency/Duration: Patient will be followed by speech-language pathology 1-2 times per day/4-7 days per week to address goals. Discharge Recommendations: To Be Determined     SUBJECTIVE:   Patient stated I don't want anything I have to chew.     OBJECTIVE:     Past Medical History:   Diagnosis Date    Glaucoma     History of poliomyelitis     Hyperlipidemia     Seizures ([facility] [address]      Past Surgical History:   Procedure Laterality Date    COLONOSCOPY N/A 1/30/2019    COLONOSCOPY w bx's performed by Lawanda Blevins MD at [address] Situation:       Cognitive and Communication Status:        Oral Assessment:  Oral Assessment  Labial: No impairment  Dentition: Edentulous  Oral Hygiene: fair  Lingual: No impairment  Velum: No impairment  P.O. Trials:  Patient Position: 90  Vocal quality prior to P.O.: No impairment  Consistency Presented: Puree; Solid; Thin liquid  How Presented: Cup/sip;Spoon; Successive swallows     Bolus Acceptance: No impairment  Bolus Formation/Control: No impairment     Propulsion: No impairment  Oral Residue: None  Initiation of Swallow: No impairment  Laryngeal Elevation: Functional  Aspiration Signs/Symptoms: None  Pharyngeal Phase Characteristics: No impairment, issues, or problems   Effective Modifications: Alternate liquids/solids;Small sips and bites; Other (comment)  Cues for Modifications: Minimal       Oral Phase Severity: Mild-moderate  Pharyngeal Phase Severity : No impairment    PAIN:  Pain level pre-treatment: 0/10   Pain level post-treatment: 0/10   Pain Intervention(s): Medication (see MAR); Rest, Ice, Repositioning   Response to intervention: Nurse notified, See doc flow    After treatment:   []            Patient left in no apparent distress sitting up in chair  [x]            Patient left in no apparent distress in bed  [x]            Call bell left within reach  [x]            Nursing notified  []            Family present  []            Caregiver present  []            Bed alarm activated    COMMUNICATION/EDUCATION:   [x]            Aspiration precautions; swallow safety; compensatory techniques. [x]            Patient/family have participated as able in goal setting and plan of care. [x]            Patient/family agree to work toward stated goals and plan of care. []            Patient understands intent and goals of therapy; neutral about participation. []            Patient unable to participate in goal setting/plan of care; educ ongoing with interdisciplinary staff  [x]         Posted safety precautions in patient's room.     Thank you for this referral.  Tomás Stokes, SLP  MA, CCC-SLP  Speech-Language Pathologist    Time Calculation: 23 mins  Evaluation Time: 10 minutes   Treatment Time: 13  minutes

## 2021-10-22 NOTE — ED NOTES
Pt received alert and oriented x4. NO signs of acute distress. Breathing with ease on room air. Pt arrived with complaint of dizziness. Pt states he took he thinks it is due to taking his medications an hour and a half early. Pt states he feels a little better but still feels a little \"off\"  Pt standing at side of bed, steady at this time. Placed on cardiac monitor. Continue to monitor. Call bell within reach.

## 2021-10-22 NOTE — ED NOTES
Purposeful rounding completed:    Side rails up x 2:  YES  Bed in low position and wheels locked: YES  Call bell within reach: YES  Comfort addressed: YES    Toileting needs addressed: YES  Plan of care reviewed/updated with patient and or family members: YES  IV site assessed: YES  Pain assessed and addressed: YES, 0    Patient laying quietly in bed with eyes closed. No distress observed.

## 2021-10-22 NOTE — ED NOTES
Repeat Carbamezapine level collected and sent to lab. VS stable. NO distress noted. Continue to monitor.

## 2021-10-22 NOTE — H&P
History & Physical    Patient: Elvina Koyanagi MRN: [id number]  CSN: [id number]    YOB: 1950  Age: 66 y.o. Sex: male      DOA: 10/21/2021    Chief Complaint: No chief complaint on file. HPI:     Elvina Koyanagi is a 66 y.o.  male with a history of hyperlipidemia, glaucoma, poliomyelitis with some weakness  Rt arm and Rt leg, seizures, and h/o low iron presented to the ER yesterday with unsteadiness of gait and \"feeling drunk. \" Patient states he took his Tegretol and immediately felt unsteady. His symptoms improved in the ER. Today he states \"I feel good and want to go home. \" Reports dizziness and weakness in his right upper extremity. He knows the month is October and the year is 2021. He denies taking Movantik with his Tegretol. He was supposed to follow up with neurology, but has not followed up with them yet. On his last admission, he was on Lovenox Patient is able to pass urine. Denies headache, fever, chest pain, shortness of breath, heartburn, abdominal pain, nausea, vomiting, diarrhea, and neuropathy. Initial labs in the ER  WBC 7.4, Hgb 13.9, Platelets 962  Na 144, Potassium 4.0, Glucose 99  BUN 20, Cr 0.91  CK 97, CK-MB <1.0  Troponin I less than 0.02  Triglyceride 66, Total cholesterol 231, , VLDL 13.2, LDL 97  Vitamin B12 473, Folate 14.1, Iron 100, TIBC 247, Iron % Saturation 40  Carbamazepine 15.2-->16.5--> 15.2 currently    Urinalysis showed: trace blood and few bacteria, otherwise unremarkable    Patient had work up in the ER (see below. )    EKG showed:  - Normal sinus rhythm   - Possible Left atrial enlargement   - Left axis deviation   - Abnormal ECG   - When compared with ECG of 20-JUL-2019 14:14, No significant change was found     CT HEAD WO CONTRAST (10/21/2021)  IMPRESSION  1. No acute intracranial pathology. 2. 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 Patient was started on IVF. Patient was admitted for evaluation and treatment.          Past Medical History:   Diagnosis Date    Glaucoma     History of poliomyelitis     Hyperlipidemia     Seizures (HCC)        Past Surgical History:   Procedure Laterality Date    COLONOSCOPY N/A 2019    COLONOSCOPY w bx's performed by Teresa Garza MD at [facility] END[facility]COPY    HX APPENDECTOMY         Family History   Family history unknown: Yes       Social History     Socioeconomic History    Marital status: UNKNOWN     Spouse name: Not on file    Number of children: Not on file    Years of education: Not on file    Highest education level: Not on file   Tobacco Use    Smoking status: Former Smoker     Types: Cigarettes     Quit date: 2016     Years since quittin.3    Smokeless tobacco: Former User     Quit date: 2016   Substance and Sexual Activity    Alcohol use: No    Drug use: No    Sexual activity: Yes     Partners: Female     Social Determinants of Health     Financial Resource Strain:     Difficulty of Paying Living Expenses:    Food Insecurity:     Worried About Running Out of Food in the Last Year:     Ran Out of Food in the Last Year:    Transportation Needs:     Lack of Transportation (Medical):  Lack of Transportation (Non-Medical):    Physical Activity:     Days of Exercise per Week:     Minutes of Exercise per Session:    Stress:     Feeling of Stress :    Social Connections:     Frequency of Communication with Friends and Family:     Frequency of Social Gatherings with Friends and Family:     Attends Islam Services:     Active Member of Clubs or Organizations:     Attends Club or Organization Meetings:     Marital Status:        Prior to Admission medications    Medication Sig Start Date End Date Taking? Authorizing Provider   acetaminophen (TYLENOL) 650 mg TbER Take 1 Tab by mouth every four (4) hours as needed for Pain or Fever.  19  Yes Leydi Lee MD   bisacodyl (DULCOLAX) 10 mg suppository Insert 10 mg into rectum daily as needed for Other (if no bowel movement >48hrs). 7/23/19  Yes Lela Lee MD   carBAMazepine XR (TEGretol XR) 400 mg SR tablet take 1 tablet by mouth twice a day 9/22/21   Tequila Can NP   cyanocobalamin 1,000 mcg tablet Take 1 Tab by mouth daily. 7/23/19   Lela Lee MD   famotidine (PEPCID) 20 mg tablet Take 1 Tab by mouth two (2) times a day. 7/23/19   Lela Lee MD   ferrous sulfate 325 mg (65 mg iron) tablet Take 1 Tab by mouth daily (with breakfast). 7/23/19   Lela Lee MD   naloxegol (MOVANTIK) 25 mg tab tablet Take 1 Tab by mouth Daily (before breakfast). Patient not taking: Reported on 9/22/2021 7/24/19   Lela Lee MD   latanoprost (XALATAN) 0.005 % ophthalmic solution place 1 drop into both eyes at bedtime 9/24/18   Caitlyn Church NP   pravastatin (PRAVACHOL) 40 mg tablet Take 1 Tab by mouth nightly. 7/1/16   Lore Angel MD       No Known Allergies    Review of Systems  GENERAL: Patient alert, awake and oriented times 3, able to communicate full sentences and not in distress. HEENT: h/o glaucoma  NECK: No pain or stiffness. CARDIOVASCULAR: No chest pain or pressure. No palpitations. PULMONARY: No shortness of breath, cough or wheeze. GASTROINTESTINAL: No abdominal pain, nausea, vomiting or diarrhea, melena or       bright red blood per rectum. GENITOURINARY: No urinary frequency, urgency, hesitancy or dysuria. MUSCULOSKELETAL: No joint or muscle pain, no back pain, no recent trauma. DERMATOLOGIC: No rash, no itching, no lesions. ENDOCRINE: No polyuria, polydipsia, no heat or cold intolerance. No recent change in    weight. HEMATOLOGICAL: h/o low iron   NEUROLOGIC: + dizziness, + weakness in RUE, h/o seizures taking Tegretol   PSYCHIATRIC: No depression, anxiety, mood disorder, no loss of interest in normal       activity or change in sleep pattern.         Physical Exam:     Physical Exam:  Visit Vitals  BP Abdon Romero ) 142/79   Pulse 79   Temp 97.4 °F (36.3 °C)   Resp 17   Ht 5' 9\" (1.753 m)   Wt 59.4 kg (131 lb)   SpO2 100%   BMI 19.35 kg/m²      O2 Device: None (Room air)    Temp (24hrs), Av.2 °F (36.8 °C), Min:97.4 °F (36.3 °C), Max:98.9 °F (37.2 °C)    No intake/output data recorded. 10/20 1901 - 10/22 0700  In: 1000 [I.V.:1000]  Out: -     General:  Alert,no distress, appears stated age. Head:  Normocephalic, without obvious abnormality, atraumatic. Eyes:  Conjunctivae/corneas clear. PERRL, EOMs intact. Nose: Nares normal. No drainage or sinus tenderness. Throat: Lips, mucosa, and tongue dry   Neck: Supple, symmetrical, trachea midline, no adenopathy, thyroid: no enlargement/tenderness/nodules, no carotid bruit and no JVD. Back:   ROM normal. No CVA tenderness. Possible sebaceous cyst on right chest  (non painful) sent for surgery as outpatient   Lungs:   Clear to auscultation bilaterally. Chest wall:  No tenderness or deformity. Heart:  Regular rate and rhythm, S1, S2 normal, no murmur, click, rub or gallop. Abdomen: Soft, non-tender. Bowel sounds normal. No masses,  No organomegaly. Extremities: RUE weakness, no cyanosis or edema. Pulses: 2+ and symmetric all extremities. Skin: Skin color, texture, turgor normal. No rashes or lesions   Neurologic: CNII-XII intact. No focal motor or sensory deficit. Labs Reviewed: All lab results for the last 24 hours reviewed.   CT and EKG  Recent Results (from the past 24 hour(s))   EKG, 12 LEAD, INITIAL    Collection Time: 10/21/21  7:48 PM   Result Value Ref Range    Ventricular Rate 82 BPM    Atrial Rate 82 BPM    P-R Interval 166 ms    QRS Duration 76 ms    Q-T Interval 350 ms    QTC Calculation (Bezet) 408 ms    Calculated P Axis 71 degrees    Calculated R Axis -44 degrees    Calculated T Axis 58 degrees    Diagnosis       Normal sinus rhythm  Possible Left atrial enlargement  Left axis deviation  Abnormal ECG  When compared with ECG of 20-JUL-2019 14:14,  No significant change was found  Confirmed by Sheridan Shoulders ([id number]) on 10/22/2021 10:17:40 AM     CBC WITH AUTOMATED DIFF    Collection Time: 10/21/21  7:58 PM   Result Value Ref Range    WBC 7.4 4.6 - 13.2 K/uL    RBC 4.52 4.35 - 5.65 M/uL    HGB 13.9 13.0 - 16.0 g/dL    HCT 41.8 36.0 - 48.0 %    MCV 92.5 78.0 - 100.0 FL    MCH 30.8 24.0 - 34.0 PG    MCHC 33.3 31.0 - 37.0 g/dL    RDW 12.2 11.6 - 14.5 %    PLATELET 666 550 - 052 K/uL    MPV 10.3 9.2 - 11.8 FL    NEUTROPHILS 62 40 - 73 %    LYMPHOCYTES 33 21 - 52 %    MONOCYTES 4 3 - 10 %    EOSINOPHILS 0 0 - 5 %    BASOPHILS 0 0 - 2 %    ABS. NEUTROPHILS 4.6 1.8 - 8.0 K/UL    ABS. LYMPHOCYTES 2.5 0.9 - 3.6 K/UL    ABS. MONOCYTES 0.3 0.05 - 1.2 K/UL    ABS. EOSINOPHILS 0.0 0.0 - 0.4 K/UL    ABS. BASOPHILS 0.0 0.0 - 0.1 K/UL    DF AUTOMATED     METABOLIC PANEL, COMPREHENSIVE    Collection Time: 10/21/21  8:40 PM   Result Value Ref Range    Sodium 141 136 - 145 mmol/L    Potassium 4.0 3.5 - 5.5 mmol/L    Chloride 108 100 - 111 mmol/L    CO2 28 21 - 32 mmol/L    Anion gap 5 3.0 - 18 mmol/L    Glucose 99 74 - 99 mg/dL    BUN 20 (H) 7.0 - 18 MG/DL    Creatinine 0.91 0.6 - 1.3 MG/DL    BUN/Creatinine ratio 22 (H) 12 - 20      GFR est AA >60 >60 ml/min/1.73m2    GFR est non-AA >60 >60 ml/min/1.73m2    Calcium 8.8 8.5 - 10.1 MG/DL    Bilirubin, total 0.3 0.2 - 1.0 MG/DL    ALT (SGPT) 19 16 - 61 U/L    AST (SGOT) 13 10 - 38 U/L    Alk.  phosphatase 87 45 - 117 U/L    Protein, total 6.7 6.4 - 8.2 g/dL    Albumin 3.9 3.4 - 5.0 g/dL    Globulin 2.8 2.0 - 4.0 g/dL    A-G Ratio 1.4 0.8 - 1.7     TROPONIN I    Collection Time: 10/21/21  8:40 PM   Result Value Ref Range    Troponin-I, QT <0.02 0.0 - 0.045 NG/ML   CARBAMAZEPINE    Collection Time: 10/21/21 10:15 PM   Result Value Ref Range    Carbamazepine 15.2 (HH) 4.0 - 62.5 ug/mL   SALICYLATE    Collection Time: 10/22/21  2:20 AM   Result Value Ref Range    Salicylate level 2.4 (L) 2.8 - 20.0 MG/DL ACETAMINOPHEN    Collection Time: 10/22/21  2:20 AM   Result Value Ref Range    Acetaminophen level <2 (L) 10.0 - 30.0 ug/mL   CARBAMAZEPINE    Collection Time: 10/22/21  2:20 AM   Result Value Ref Range    Carbamazepine 16.5 (HH) 4.0 - 12.0 ug/mL   IRON PROFILE    Collection Time: 10/22/21  2:20 AM   Result Value Ref Range    Iron 100 50 - 175 ug/dL    TIBC 247 (L) 250 - 450 ug/dL    Iron % saturation 40 20 - 50 %   VITAMIN B12 & FOLATE    Collection Time: 10/22/21  2:20 AM   Result Value Ref Range    Vitamin B12 473 211 - 911 pg/mL    Folate 14.1 3.10 - 17.50 ng/mL   URINALYSIS W/ RFLX MICROSCOPIC    Collection Time: 10/22/21  4:30 AM   Result Value Ref Range    Color YELLOW      Appearance CLEAR      Specific gravity 1.014 1.005 - 1.030      pH (UA) 6.0 5.0 - 8.0      Protein Negative NEG mg/dL    Glucose Negative NEG mg/dL    Ketone Negative NEG mg/dL    Bilirubin Negative NEG      Blood TRACE (A) NEG      Urobilinogen 0.2 0.2 - 1.0 EU/dL    Nitrites Negative NEG      Leukocyte Esterase Negative NEG     DRUG SCREEN, URINE    Collection Time: 10/22/21  4:30 AM   Result Value Ref Range    BENZODIAZEPINES Negative NEG      BARBITURATES Negative NEG      THC (TH-CANNABINOL) Negative NEG      OPIATES Negative NEG      PCP(PHENCYCLIDINE) Negative NEG      COCAINE Negative NEG      AMPHETAMINES Negative NEG      METHADONE Negative NEG      HDSCOM (NOTE)    URINE MICROSCOPIC ONLY    Collection Time: 10/22/21  4:30 AM   Result Value Ref Range    WBC 0 to 2 0 - 4 /hpf    RBC 0 to 2 0 - 5 /hpf    Bacteria FEW (A) NEG /hpf   CARDIAC PANEL,(CK, CKMB & TROPONIN)    Collection Time: 10/22/21  6:30 AM   Result Value Ref Range    CK - MB <1.0 <3.6 ng/ml    CK-MB Index  0.0 - 4.0 %     CALCULATION NOT PERFORMED WHEN RESULT IS BELOW LINEAR LIMIT    CK 97 39 - 308 U/L    Troponin-I, QT <0.02 0.0 - 0.045 NG/ML   LIPID PANEL    Collection Time: 10/22/21  6:30 AM   Result Value Ref Range    LIPID PROFILE          Cholesterol, total 231 (H) <200 MG/DL    Triglyceride 66 <150 MG/DL    HDL Cholesterol 122 (H) 40 - 60 MG/DL    LDL, calculated 95.8 0 - 100 MG/DL    VLDL, calculated 13.2 MG/DL    CHOL/HDL Ratio 1.9 0 - 5.0     CARBAMAZEPINE    Collection Time: 10/22/21  6:30 AM   Result Value Ref Range    Carbamazepine 15.2 (HH) 4.0 - 12.0 ug/mL     Procedures/imaging: see electronic medical records for all procedures/Xrays and details which were not copied into this note but were reviewed prior to creation of Plan        Assessment/Plan     Active Problems:    Epilepsy ([facility] [address] (7/12/2017)      B12 deficiency (7/24/2019)      Tegretol toxicity, accidental or unintentional, initial encounter (10/22/2021)       Epilepsy/Tegretol toxicity  - Consult neurology--holding seizure medication until consulted  -  IVFLR 100 cc/h  - Carbamazepine 15.2-->16.5--> 15.2 currently    Hyperlipidemia  -  Start Pravastatin 10 mg PO daily at bedtime  - Check cardiac panel Q8H  - Triglyceride 66, Total cholesterol 231, , VLDL 13.2, LDL 97    Glaucoma  - Xalantan 0.005% ophthalmic solution 1 drop both eyes daily    B12 deficiency/Low Iron  - Cyanocobalamin 1000 mcg PO daily  - Ferrous Sulfate 325 mg PO daily with breakfast  - Vitamin B12 473, Folate 14.1, Iron 100, TIBC 247, Iron % Saturation 40    - Continue IVF hydration  - Ordered swallow evaluation  - Ordered PT/OT evaluate and treat  - Check cardiac enzymes Q8H  - Ordered Vitamin D--pending  - Start Pravastatin 10 mg PO daily at bedtime  - Start Cyanocobalamin 1000 mcg PO daily  - Start Ferrous Sulfate 325 mg PO daily with breakfast  - Neurology consult--pending  - CBC, CMP, Mg to be completed tomorrow morning    DVT/GI Prophylaxis: Lovenox and Pepcid      Millicent Holm  44[birth year]89  The patient was seen and examined independently, I agree with the student note  Review note and appropriate changes was made.  Discussed with student my assessment and plan as outlined in the above note   Discussed with ER nursing staff and Neurology consult Dr Malvin Siemens    Time for admission more than 65 minutes included review previous record,hospital record ,test result ,discussion with patient and exam. Discussion with nursing staff and documentation discussion with neurology consult  Feng Martinez MD  10/22/2021

## 2021-10-22 NOTE — ED NOTES
Called report to [facility] RN to transfer patient to [address] [place] [address].

## 2021-10-22 NOTE — CONSULTS
NEUROLOGY CONSULT NOTE    Patient ID:  Trudy Reyes  [id number]  58 y.o.  1950    Date of Consultation:  October 22, 2021    Referring Physician: Rajwinder Aguilar MD    Reason for Consultation: Dizziness      Subjective:       History of Present Illness: This is a 75-year-old man, right-hand-dominant, with history of congenital right hemiparesis, epilepsy on Tegretol, hyperlipidemia, evaluated for chief complaints of new onset dizziness/gait instability. The patient noticed the symptoms yesterday evening after he took his second dose of Tegretol. He had a sensation like being drunk and unsteady when ambulance. No headache, no nausea vomiting, no focal weakness. Tegretol level was sent and was supratherapeutic. Patient is on Tegretol- mg twice a day. Sodium level was normal.  CT head with evidence of chronic left frontal infarct. No other complaints today, however this afternoon he feels improved comparing with admission. Patient Active Problem List    Diagnosis Date Noted    Tegretol toxicity, accidental or unintentional, initial encounter 10/22/2021    B12 deficiency 07/24/2019    Closed displaced intertrochanteric fracture of right femur ([facility] [address] 07/21/2019    Femur fracture, right ([address] 07/20/2019    Epilepsy ([address] 07/12/2017    Congenital hemiparesis ([address] 07/12/2017     Past Medical History:   Diagnosis Date    Glaucoma     History of poliomyelitis     Hyperlipidemia     Seizures ([address]       Past Surgical History:   Procedure Laterality Date    COLONOSCOPY N/A 1/30/2019    COLONOSCOPY w bx's performed by Rocio Marshall MD at [address] [place] [address]        Prior to Admission medications    Medication Sig Start Date End Date Taking? Authorizing Provider   acetaminophen (TYLENOL) 650 mg TbER Take 1 Tab by mouth every four (4) hours as needed for Pain or Fever.  7/23/19  Yes Srinivas Lee MD   bisacodyl (DULCOLAX) 10 mg suppository Insert 10 mg into rectum daily as needed for Other (if no bowel movement >48hrs). 19  Yes Juan Ramon Lee MD   carBAMazepine XR (TEGretol XR) 400 mg SR tablet take 1 tablet by mouth twice a day 21   George Taylor NP   cyanocobalamin 1,000 mcg tablet Take 1 Tab by mouth daily. 19   Juan Ramon Lee MD   famotidine (PEPCID) 20 mg tablet Take 1 Tab by mouth two (2) times a day. 19   Juan Ramon Lee MD   ferrous sulfate 325 mg (65 mg iron) tablet Take 1 Tab by mouth daily (with breakfast). 19   Juan Ramon Lee MD   naloxegol (MOVANTIK) 25 mg tab tablet Take 1 Tab by mouth Daily (before breakfast). Patient not taking: Reported on 2021   Juan Ramon Lee MD   latanoprost (XALATAN) 0.005 % ophthalmic solution place 1 drop into both eyes at bedtime 18   Yoni Ernst NP   pravastatin (PRAVACHOL) 40 mg tablet Take 1 Tab by mouth nightly. 16   Malinda Pal MD     No Known Allergies   Social History     Tobacco Use    Smoking status: Former Smoker     Types: Cigarettes     Quit date: 2016     Years since quittin.3    Smokeless tobacco: Former User     Quit date: 2016   Substance Use Topics    Alcohol use: No      Family History   Family history unknown: Yes       Review of Systems    Constitutional: No recent weight change, fever,fatigue, sleep difficulties, or loss of appetite. ENT/Mouth:  No hearing loss, ringing in the ears, chronic sinus problem, nose bleeds sore throat, voice change, hoarseness, swollen glands in neck, or difficulties with chewing and swallowing. Cardiovascular:  No chest pain/angina pectoris, palpitations,swelling of feet/ankles/hands, or calf pain while walking. Respiratory: No chronic or frequent coughs, spitting up blood, shortness of breath, asthma, or wheezing.    Gastrointestinal: No abdominal pain, heartburn, nausea, vomiting, constipation, frequent diarrhea, rectal bleeding, or blood in stool.   Genitourinary: No frequent urination, burning or painful urination, blood in urine, incontinence or dribbling. Musculoskeletal:   No joint pain, stiffness/swelling, weakness of muscles, muscle pain/cramp, or back pain. Integument:   No rash/itching, change in skin color, change in hair/nails, or change in color/size of moles. Neurological:   Right hemiparesis secondary to left frontal stroke as a child, epilepsy, gait disturbance/dizziness otherwise negative   Psychiatric:   No nervousness, depression, hallucinations, paranoia or suspiciousness. Endocrine: No excessive thirst or urination, heat or cold intolerance. Hematologic/Lymphatic: No bleeding/bruising tendency, phlebitis, or past transfusion. Objective:     Patient Vitals for the past 8 hrs:   BP Temp Pulse Resp SpO2   10/22/21 0900 (!) 142/79  79 17 100 %   10/22/21 0830 134/78  75 12 100 %   10/22/21 0800 129/77  74 13 100 %   10/22/21 0730 (!) 160/69  85 13 100 %   10/22/21 0700 (!) 150/81  78 13 100 %   10/22/21 0630 132/68 97.4 °F (36.3 °C) 79 23 99 %   10/22/21 0500 (!) 146/75  72 18 100 %   10/22/21 0430 122/63  70 14 99 %   10/22/21 0330 (!) 118/59  68 12    10/22/21 0315     100 %   10/22/21 0300 133/72  67 12        General Exam  No acute distress, normal body habitus    HEENT: Normocephalic, atraumatic, Sclera anicteric, normal conjunctiva  Mucous membranes: normal color and hydration     CV: No carotid bruits,   Heart: regular to rate and rhythm. No murmurs     RESP:  CTAB     Neurologic Exam:    MENTAL STATUS:     The patient is awake, alert, and oriented x 4. Fund of knowledge is adequate. Language -nonfluent, word finding difficulties, intact comprehension, intact naming. CRANIAL NERVES:   II Pupils are midline, symmetric, both reactive to light and accommodation. Visual fields are normal.     III, IV, VI  Extraocular movements are intact and there is no nystagmus.    V  Facial sensation is intact to pinprick and light touch. VII  Face is symmetrical.   VIII - Hearing is present. IX, X, [address] rises symmetrically. Gag is present. Tongue is in the midline. XI - Shoulder shrugging and head turning intact    MOTOR:           Tone is increased on the right upper extremity. Loss of muscle bulk in the right upper extremity and spasticity. Full strength with right hemiparesis at baseline no involuntary movements. SENSATION:  Sensory examination is intact to pinprick, light touch and position sense testing. REFLEXES: 2+ and symmetrical. Plantars are down going. COORDINATION: Cerebellar examination reveals no gross ataxia or dysmetria. GAIT: Able to stand up and ambulate without difficulties he feels prior imbalance resolved. Gait is slightly wide-based with right sided spastic.       Data Review:    Recent Results (from the past 24 hour(s))   EKG, 12 LEAD, INITIAL    Collection Time: 10/21/21  7:48 PM   Result Value Ref Range    Ventricular Rate 82 BPM    Atrial Rate 82 BPM    P-R Interval 166 ms    QRS Duration 76 ms    Q-T Interval 350 ms    QTC Calculation (Bezet) 408 ms    Calculated P Axis 71 degrees    Calculated R Axis -44 degrees    Calculated T Axis 58 degrees    Diagnosis       Normal sinus rhythm  Possible Left atrial enlargement  Left axis deviation  Abnormal ECG  When compared with ECG of 20-JUL-2019 14:14,  No significant change was found  Confirmed by Miguel Garcia ([id number]) on 10/22/2021 10:17:40 AM     CBC WITH AUTOMATED DIFF    Collection Time: 10/21/21  7:58 PM   Result Value Ref Range    WBC 7.4 4.6 - 13.2 K/uL    RBC 4.52 4.35 - 5.65 M/uL    HGB 13.9 13.0 - 16.0 g/dL    HCT 41.8 36.0 - 48.0 %    MCV 92.5 78.0 - 100.0 FL    MCH 30.8 24.0 - 34.0 PG    MCHC 33.3 31.0 - 37.0 g/dL    RDW 12.2 11.6 - 14.5 %    PLATELET 351 916 - 412 K/uL    MPV 10.3 9.2 - 11.8 FL    NEUTROPHILS 62 40 - 73 %    LYMPHOCYTES 33 21 - 52 % MONOCYTES 4 3 - 10 %    EOSINOPHILS 0 0 - 5 %    BASOPHILS 0 0 - 2 %    ABS. NEUTROPHILS 4.6 1.8 - 8.0 K/UL    ABS. LYMPHOCYTES 2.5 0.9 - 3.6 K/UL    ABS. MONOCYTES 0.3 0.05 - 1.2 K/UL    ABS. EOSINOPHILS 0.0 0.0 - 0.4 K/UL    ABS. BASOPHILS 0.0 0.0 - 0.1 K/UL    DF AUTOMATED     METABOLIC PANEL, COMPREHENSIVE    Collection Time: 10/21/21  8:40 PM   Result Value Ref Range    Sodium 141 136 - 145 mmol/L    Potassium 4.0 3.5 - 5.5 mmol/L    Chloride 108 100 - 111 mmol/L    CO2 28 21 - 32 mmol/L    Anion gap 5 3.0 - 18 mmol/L    Glucose 99 74 - 99 mg/dL    BUN 20 (H) 7.0 - 18 MG/DL    Creatinine 0.91 0.6 - 1.3 MG/DL    BUN/Creatinine ratio 22 (H) 12 - 20      GFR est AA >60 >60 ml/min/1.73m2    GFR est non-AA >60 >60 ml/min/1.73m2    Calcium 8.8 8.5 - 10.1 MG/DL    Bilirubin, total 0.3 0.2 - 1.0 MG/DL    ALT (SGPT) 19 16 - 61 U/L    AST (SGOT) 13 10 - 38 U/L    Alk.  phosphatase 87 45 - 117 U/L    Protein, total 6.7 6.4 - 8.2 g/dL    Albumin 3.9 3.4 - 5.0 g/dL    Globulin 2.8 2.0 - 4.0 g/dL    A-G Ratio 1.4 0.8 - 1.7     TROPONIN I    Collection Time: 10/21/21  8:40 PM   Result Value Ref Range    Troponin-I, QT <0.02 0.0 - 0.045 NG/ML   CARBAMAZEPINE    Collection Time: 10/21/21 10:15 PM   Result Value Ref Range    Carbamazepine 15.2 (HH) 4.0 - 68.8 ug/mL   SALICYLATE    Collection Time: 10/22/21  2:20 AM   Result Value Ref Range    Salicylate level 2.4 (L) 2.8 - 20.0 MG/DL   ACETAMINOPHEN    Collection Time: 10/22/21  2:20 AM   Result Value Ref Range    Acetaminophen level <2 (L) 10.0 - 30.0 ug/mL   CARBAMAZEPINE    Collection Time: 10/22/21  2:20 AM   Result Value Ref Range    Carbamazepine 16.5 (HH) 4.0 - 12.0 ug/mL   IRON PROFILE    Collection Time: 10/22/21  2:20 AM   Result Value Ref Range    Iron 100 50 - 175 ug/dL    TIBC 247 (L) 250 - 450 ug/dL    Iron % saturation 40 20 - 50 %   VITAMIN B12 & FOLATE    Collection Time: 10/22/21  2:20 AM   Result Value Ref Range    Vitamin B12 473 211 - 911 pg/mL    Folate 14.1 3.10 - 17.50 ng/mL   URINALYSIS W/ RFLX MICROSCOPIC    Collection Time: 10/22/21  4:30 AM   Result Value Ref Range    Color YELLOW      Appearance CLEAR      Specific gravity 1.014 1.005 - 1.030      pH (UA) 6.0 5.0 - 8.0      Protein Negative NEG mg/dL    Glucose Negative NEG mg/dL    Ketone Negative NEG mg/dL    Bilirubin Negative NEG      Blood TRACE (A) NEG      Urobilinogen 0.2 0.2 - 1.0 EU/dL    Nitrites Negative NEG      Leukocyte Esterase Negative NEG     DRUG SCREEN, URINE    Collection Time: 10/22/21  4:30 AM   Result Value Ref Range    BENZODIAZEPINES Negative NEG      BARBITURATES Negative NEG      THC (TH-CANNABINOL) Negative NEG      OPIATES Negative NEG      PCP(PHENCYCLIDINE) Negative NEG      COCAINE Negative NEG      AMPHETAMINES Negative NEG      METHADONE Negative NEG      HDSCOM (NOTE)    URINE MICROSCOPIC ONLY    Collection Time: 10/22/21  4:30 AM   Result Value Ref Range    WBC 0 to 2 0 - 4 /hpf    RBC 0 to 2 0 - 5 /hpf    Bacteria FEW (A) NEG /hpf   CARDIAC PANEL,(CK, CKMB & TROPONIN)    Collection Time: 10/22/21  6:30 AM   Result Value Ref Range    CK - MB <1.0 <3.6 ng/ml    CK-MB Index  0.0 - 4.0 %     CALCULATION NOT PERFORMED WHEN RESULT IS BELOW LINEAR LIMIT    CK 97 39 - 308 U/L    Troponin-I, QT <0.02 0.0 - 0.045 NG/ML   LIPID PANEL    Collection Time: 10/22/21  6:30 AM   Result Value Ref Range    LIPID PROFILE          Cholesterol, total 231 (H) <200 MG/DL    Triglyceride 66 <150 MG/DL    HDL Cholesterol 122 (H) 40 - 60 MG/DL    LDL, calculated 95.8 0 - 100 MG/DL    VLDL, calculated 13.2 MG/DL    CHOL/HDL Ratio 1.9 0 - 5.0     CARBAMAZEPINE    Collection Time: 10/22/21  6:30 AM   Result Value Ref Range    Carbamazepine 15.2 (HH) 4.0 - 12.0 ug/mL         Radiology studies:   Head CT negative for acute intracranial pathology. Evidence of chronic left frontal stroke.     55 minutes were spent on the patient of which more than 50% was spent in coordination of care and counseling (time spent with patient/family face to face, physical exam, reviewing laboratory and imaging investigations, speaking with physicians and nursing staff involved in this patient's care)    Assessment: This is a 78-year-old man, right-hand-dominant, with history of congenital right hemiparesis, epilepsy on Tegretol, hyperlipidemia, evaluated for chief complaints of new onset dizziness/gait instability. The patient clinical presentation is consistent with Tegretol toxicity. The dose he is taking at 400 mg twice a day is not very high max dose is 1600 mg/day I am wondering if he did not take extra doses by error.        Plan:     -At discharge take Tegretol 400 mg in the morning and after that continue with Tegretol  mg twice a day and repeat level in about 2 weeks or earlier if needed  -Follow-up with his outpatient neurology, Dr Oseas Espinal  -The patient can be discharged home this afternoon        Kimber Saavedra MD  Adult Neurologist  10/22/2021

## 2021-10-22 NOTE — PROGRESS NOTES
completed the initial Spiritual Assessment of the patient in bed 1 of the emergency room and offered Pastoral Care support to the patient. , who has been in the emergency room now 13 hours. Patient dos not have an advance directive . Patient does not have any Anglican/cultural needs that will affect patients preferences in health care. Chaplains will continue to follow and will provide pastoral care on an as needed/requested basis.     [facility]  [phone]

## 2021-10-22 NOTE — ED PROVIDER NOTES
[address]  Emergency Department Treatment Report        Patient: Tevin Duke Age: 66 y.o. Sex: male    YOB: 1950 Admit Date: 10/21/2021 PCP: Morgan Daniel MD   MRN: [id number]  CSN: [id number]  Attending: Dr. Anthony Ambrocio   Room: [facility]/01 Time Dictated: 9:01 PM CONSTANCE: Thomas Vidal MD     Chief Complaint   Unsteadiness    History of Present Illness   66 y.o. male with a history of epilepsy presents to the ER for evaluation of unsteadiness on his feet. He reports that he took his nightly Tegretol an hour and a half earlier than usual.  After the medicine he experienced this sensation of being \"drunk. \"  Reporting that he felt woozy and unbalanced when walking around. He reports that symptoms have improved upon evaluation in the emergency department. He denies visual disturbances, headache, numbness/tingling/dizziness, chest pain, shortness of breath, palpitations. He has not experienced these symptoms in the past.    Review of Systems   Review of Systems   Constitutional: Negative for chills and fever. HENT: Negative for congestion and sore throat. Eyes: Negative for blurred vision. Respiratory: Negative for cough. Cardiovascular: Negative for chest pain. Gastrointestinal: Negative for abdominal pain, diarrhea, nausea and vomiting. Genitourinary: Negative for dysuria and hematuria. Musculoskeletal: Negative for myalgias. Skin: Negative for rash. Neurological: Negative for tingling, sensory change, speech change, focal weakness, loss of consciousness, weakness and headaches.        Past Medical/Surgical History     Past Medical History:   Diagnosis Date    Glaucoma     History of poliomyelitis     Hyperlipidemia     Seizures ([facility] [address]      Past Surgical History:   Procedure Laterality Date    COLONOSCOPY N/A 1/30/2019    COLONOSCOPY w bx's performed by Akhil Prado MD at [address] [place] [address]     Social History     Socioeconomic History    Marital status: UNKNOWN     Spouse name: Not on file    Number of children: Not on file    Years of education: Not on file    Highest education level: Not on file   Occupational History    Not on file   Tobacco Use    Smoking status: Former Smoker     Types: Cigarettes     Quit date: 2016     Years since quittin.3    Smokeless tobacco: Former User     Quit date: 2016   Substance and Sexual Activity    Alcohol use: No    Drug use: No    Sexual activity: Yes     Partners: Female   Other Topics Concern    Not on file   Social History Narrative    Not on file     Social Determinants of Health     Financial Resource Strain:     Difficulty of Paying Living Expenses:    Food Insecurity:     Worried About Running Out of Food in the Last Year:     [address] in the Last Year:    Transportation Needs:     Lack of Transportation (Medical):      Lack of Transportation (Non-Medical):    Physical Activity:     Days of Exercise per Week:     Minutes of Exercise per Session:    Stress:     Feeling of Stress :    Social Connections:     Frequency of Communication with Friends and Family:     Frequency of Social Gatherings with Friends and Family:     Attends Holiness Services:     Active Member of Clubs or Organizations:     Attends Club or Organization Meetings:     Marital Status:    Intimate Partner Violence:     Fear of Current or Ex-Partner:     Emotionally Abused:     Physically Abused:     Sexually Abused:        Family History     Family History   Family history unknown: Yes       Current Medications   (Not in a hospital admission)      Allergies   No Known Allergies    Physical Exam     ED Triage Vitals   Enc Vitals Group      BP 10/21/21 1924 (!) 168/98      Pulse (Heart Rate) 10/21/21 1924 82      Resp Rate 10/21/21 1924 18      Temp --       Temp src --       O2 Sat (%) 10/21/21 1924 97 %      Weight 10/21/21 1925 131 lb      Height 10/21/21 1925 5' 9\"      Head Circumference --       Peak Flow --       Pain Score --       Pain Loc --       Pain Edu? --       Excl. in [address]? --      Physical Exam  Vitals and nursing note reviewed. Constitutional:       General: He is not in acute distress. Appearance: Normal appearance. He is not toxic-appearing. HENT:      Head: Normocephalic and atraumatic. Mouth/Throat:      Mouth: Mucous membranes are moist.   Eyes:      Extraocular Movements: Extraocular movements intact. Conjunctiva/sclera: Conjunctivae normal.      Pupils: Pupils are equal, round, and reactive to light. Cardiovascular:      Rate and Rhythm: Normal rate and regular rhythm. Heart sounds: Normal heart sounds. No murmur heard. No gallop. Pulmonary:      Effort: Pulmonary effort is normal. No respiratory distress. Breath sounds: Normal breath sounds. No wheezing or rales. Abdominal:      General: Abdomen is flat. Palpations: Abdomen is soft. Musculoskeletal:      Cervical back: Neck supple. Right lower leg: No edema. Left lower leg: No edema. Skin:     General: Skin is warm and dry. Capillary Refill: Capillary refill takes less than 2 seconds. Neurological:      Mental Status: He is alert and oriented to person, place, and time. Cranial Nerves: Cranial nerves are intact. Sensory: Sensation is intact. Motor: Motor function is intact. Coordination: Finger-Nose-Finger Test normal.      Gait: Gait abnormal (Able to ambulate without assistance at beginning of gait. After a turn, he experienced unsteadiness and began to fall to the R). Comments: Baseline tendinopathy of R hand - limited dexterity of hand.  Can still complete hand to nose/finger without ataxia   Psychiatric:         Mood and Affect: Mood normal.         Behavior: Behavior normal.          Impression and Management Plan   1SYF with a history of epilepsy presents to the ER for evaluation of the sensation of feeling \"drunk\" after he took his nightly Tegretol early this evening. Vital signs within normal limits; afebrile. Physical exam reveals a thin but well-appearing male in no acute distress. Benign cardiopulmonary and abdominal exam.  Normal neurologic exam aside from some unsteadiness on his feet. EKG is nonischemic without arrhythmias. Will check basic labs, Tegretol level, and CT head for evaluation. Diagnostic Studies   Lab:   Recent Results (from the past 12 hour(s))   CBC WITH AUTOMATED DIFF    Collection Time: 10/21/21  7:58 PM   Result Value Ref Range    WBC 7.4 4.6 - 13.2 K/uL    RBC 4.52 4.35 - 5.65 M/uL    HGB 13.9 13.0 - 16.0 g/dL    HCT 41.8 36.0 - 48.0 %    MCV 92.5 78.0 - 100.0 FL    MCH 30.8 24.0 - 34.0 PG    MCHC 33.3 31.0 - 37.0 g/dL    RDW 12.2 11.6 - 14.5 %    PLATELET 648 718 - 426 K/uL    MPV 10.3 9.2 - 11.8 FL    NEUTROPHILS 62 40 - 73 %    LYMPHOCYTES 33 21 - 52 %    MONOCYTES 4 3 - 10 %    EOSINOPHILS 0 0 - 5 %    BASOPHILS 0 0 - 2 %    ABS. NEUTROPHILS 4.6 1.8 - 8.0 K/UL    ABS. LYMPHOCYTES 2.5 0.9 - 3.6 K/UL    ABS. MONOCYTES 0.3 0.05 - 1.2 K/UL    ABS. EOSINOPHILS 0.0 0.0 - 0.4 K/UL    ABS. BASOPHILS 0.0 0.0 - 0.1 K/UL    DF AUTOMATED     METABOLIC PANEL, COMPREHENSIVE    Collection Time: 10/21/21  8:40 PM   Result Value Ref Range    Sodium 141 136 - 145 mmol/L    Potassium 4.0 3.5 - 5.5 mmol/L    Chloride 108 100 - 111 mmol/L    CO2 28 21 - 32 mmol/L    Anion gap 5 3.0 - 18 mmol/L    Glucose 99 74 - 99 mg/dL    BUN 20 (H) 7.0 - 18 MG/DL    Creatinine 0.91 0.6 - 1.3 MG/DL    BUN/Creatinine ratio 22 (H) 12 - 20      GFR est AA >60 >60 ml/min/1.73m2    GFR est non-AA >60 >60 ml/min/1.73m2    Calcium 8.8 8.5 - 10.1 MG/DL    Bilirubin, total 0.3 0.2 - 1.0 MG/DL    ALT (SGPT) 19 16 - 61 U/L    AST (SGOT) 13 10 - 38 U/L    Alk.  phosphatase 87 45 - 117 U/L    Protein, total 6.7 6.4 - 8.2 g/dL    Albumin 3.9 3.4 - 5.0 g/dL    Globulin 2.8 2.0 - 4.0 g/dL    A-G Ratio 1.4 0.8 - 1. 7     TROPONIN I    Collection Time: 10/21/21  8:40 PM   Result Value Ref Range    Troponin-I, QT <0.02 0.0 - 0.045 NG/ML     Labs Reviewed   METABOLIC PANEL, COMPREHENSIVE - Abnormal; Notable for the following components:       Result Value    BUN 20 (*)     BUN/Creatinine ratio 22 (*)     All other components within normal limits   CBC WITH AUTOMATED DIFF   TROPONIN I   CARBAMAZEPINE       Imaging:    No results found. EKG as interpreted by me: Time 1948. Normal sinus rhythm. Left axis deviation. Normal intervals. No ST segment elevation or depression. Imaging as interpreted by me: CT head pending. ED Course/Medical Decision Making   Bedside telemetry was due to unsteadiness. On my interpretation it shows -  Rate 74, normal sinus rhythm    Labs reveal normal blood counts, electrolytes. Elevated BUN to creatinine ratio. Hepatic function within normal limits. Negative troponin. Given reassuring EKG, negative troponin and lack of chest pain, no evidence consistent with acute coronary syndrome. Symptoms inconsistent with vertigo. Given that symptoms are intermittent and reassuring neurologic exam, no evidence to suggest posterior circulation stroke. I suspect he is suffering from a side effect of his medication versus dehydration. Symptoms improving with IVF. Dispo pending head CT and Tegretol level. Case discussed with and turned over to Dr. Jacob Rodríguez for the rest of care. Final Diagnosis       ICD-10-CM ICD-9-CM   1. Unsteadiness on feet  R26.81 781.2   2. History of epilepsy  Z86.69 V12.49       Disposition   Per. Dr. Modesta Cheng MD  October 21, 2021  10:09 PM      My signature above authenticates this document and my orders, the final    diagnosis (es), discharge prescription (s), and instructions in the Epic    record. If you have any questions please contact [phone].      Nursing notes have been reviewed by the physician/ advanced practice Clinician.

## 2021-10-22 NOTE — PROGRESS NOTES
ST eval completed; Rec initiate Minced/moist diet with thin liquids. Meds whole with liquid wash. ST will continue to follow as medically indicated.      D/W RN Dorys Soto    Thank You for this referral,   Pavel Mejia, [id number] [facility]  Speech Language Pathologist

## 2021-10-22 NOTE — ED NOTES
Repeat labs collected and sent. Lights dimmed in room. Pt resting on stretcher. VS remain stable. Continue to monitor.

## 2021-10-22 NOTE — ED NOTES
Pt with increased Carbamezapine level per lab. Attending paged. Dr. Terrie Miller aware of elevated level. As per Dr. Terrie Miller repeat Carbamezapine level every four hours until decreased.

## 2021-10-22 NOTE — ED NOTES
Purposeful rounding completed:    Side rails up x 2:  YES  Bed in low position and wheels locked: YES  Call bell within reach: YES  Comfort addressed: YES    Toileting needs addressed: YES  Plan of care reviewed/updated with patient and or family members: YES  IV site assessed: YES  Pain assessed and addressed: YES, 0    Patient sitting quietly in bed with eyes open. No distress observed.

## 2021-10-23 ENCOUNTER — HOME HEALTH ADMISSION (OUTPATIENT)
Dept: HOME HEALTH SERVICES | Facility: HOME HEALTH | Age: 71
End: 2021-10-23
Payer: MEDICARE

## 2021-10-23 VITALS
OXYGEN SATURATION: 98 % | HEIGHT: 69 IN | RESPIRATION RATE: 16 BRPM | SYSTOLIC BLOOD PRESSURE: 144 MMHG | WEIGHT: 131 LBS | BODY MASS INDEX: 19.4 KG/M2 | HEART RATE: 77 BPM | DIASTOLIC BLOOD PRESSURE: 73 MMHG | TEMPERATURE: 97.8 F

## 2021-10-23 LAB
25(OH)D3 SERPL-MCNC: 36.9 NG/ML (ref 30–100)
ALBUMIN SERPL-MCNC: 3.7 G/DL (ref 3.4–5)
ALBUMIN/GLOB SERPL: 1.4 {RATIO} (ref 0.8–1.7)
ALP SERPL-CCNC: 84 U/L (ref 45–117)
ALT SERPL-CCNC: 17 U/L (ref 16–61)
ANION GAP SERPL CALC-SCNC: 3 MMOL/L (ref 3–18)
AST SERPL-CCNC: 13 U/L (ref 10–38)
BASOPHILS # BLD: 0 K/UL (ref 0–0.1)
BASOPHILS NFR BLD: 1 % (ref 0–2)
BILIRUB SERPL-MCNC: 0.5 MG/DL (ref 0.2–1)
BUN SERPL-MCNC: 13 MG/DL (ref 7–18)
BUN/CREAT SERPL: 17 (ref 12–20)
CALCIUM SERPL-MCNC: 9.2 MG/DL (ref 8.5–10.1)
CARBAMAZEPINE SERPL-MCNC: 4.8 UG/ML (ref 4–12)
CHLORIDE SERPL-SCNC: 108 MMOL/L (ref 100–111)
CK MB CFR SERPL CALC: NORMAL % (ref 0–4)
CK MB SERPL-MCNC: <1 NG/ML (ref 5–25)
CK SERPL-CCNC: 91 U/L (ref 39–308)
CO2 SERPL-SCNC: 29 MMOL/L (ref 21–32)
CREAT SERPL-MCNC: 0.75 MG/DL (ref 0.6–1.3)
DIFFERENTIAL METHOD BLD: ABNORMAL
EOSINOPHIL # BLD: 0.1 K/UL (ref 0–0.4)
EOSINOPHIL NFR BLD: 1 % (ref 0–5)
ERYTHROCYTE [DISTWIDTH] IN BLOOD BY AUTOMATED COUNT: 11.7 % (ref 11.6–14.5)
GLOBULIN SER CALC-MCNC: 2.7 G/DL (ref 2–4)
GLUCOSE SERPL-MCNC: 82 MG/DL (ref 74–99)
HCT VFR BLD AUTO: 39 % (ref 36–48)
HGB BLD-MCNC: 13.1 G/DL (ref 13–16)
LYMPHOCYTES # BLD: 2.7 K/UL (ref 0.9–3.6)
LYMPHOCYTES NFR BLD: 49 % (ref 21–52)
MAGNESIUM SERPL-MCNC: 1.9 MG/DL (ref 1.6–2.6)
MCH RBC QN AUTO: 30.9 PG (ref 24–34)
MCHC RBC AUTO-ENTMCNC: 33.6 G/DL (ref 31–37)
MCV RBC AUTO: 92 FL (ref 78–100)
MONOCYTES # BLD: 0.3 K/UL (ref 0.05–1.2)
MONOCYTES NFR BLD: 5 % (ref 3–10)
NEUTS SEG # BLD: 2.5 K/UL (ref 1.8–8)
NEUTS SEG NFR BLD: 45 % (ref 40–73)
PLATELET # BLD AUTO: 196 K/UL (ref 135–420)
PMV BLD AUTO: 10.6 FL (ref 9.2–11.8)
POTASSIUM SERPL-SCNC: 4.3 MMOL/L (ref 3.5–5.5)
PROT SERPL-MCNC: 6.4 G/DL (ref 6.4–8.2)
RBC # BLD AUTO: 4.24 M/UL (ref 4.35–5.65)
SODIUM SERPL-SCNC: 140 MMOL/L (ref 136–145)
TROPONIN I SERPL-MCNC: <0.02 NG/ML (ref 0–0.04)
WBC # BLD AUTO: 5.5 K/UL (ref 4.6–13.2)

## 2021-10-23 PROCEDURE — 97161 PT EVAL LOW COMPLEX 20 MIN: CPT

## 2021-10-23 PROCEDURE — 82553 CREATINE MB FRACTION: CPT

## 2021-10-23 PROCEDURE — 85025 COMPLETE CBC W/AUTO DIFF WBC: CPT

## 2021-10-23 PROCEDURE — 74011250636 HC RX REV CODE- 250/636: Performed by: FAMILY MEDICINE

## 2021-10-23 PROCEDURE — 82306 VITAMIN D 25 HYDROXY: CPT

## 2021-10-23 PROCEDURE — 97165 OT EVAL LOW COMPLEX 30 MIN: CPT

## 2021-10-23 PROCEDURE — 83735 ASSAY OF MAGNESIUM: CPT

## 2021-10-23 PROCEDURE — 94760 N-INVAS EAR/PLS OXIMETRY 1: CPT

## 2021-10-23 PROCEDURE — 36415 COLL VENOUS BLD VENIPUNCTURE: CPT

## 2021-10-23 PROCEDURE — 74011250637 HC RX REV CODE- 250/637: Performed by: PSYCHIATRY & NEUROLOGY

## 2021-10-23 PROCEDURE — 74011250637 HC RX REV CODE- 250/637: Performed by: FAMILY MEDICINE

## 2021-10-23 PROCEDURE — 80053 COMPREHEN METABOLIC PANEL: CPT

## 2021-10-23 PROCEDURE — 80156 ASSAY CARBAMAZEPINE TOTAL: CPT

## 2021-10-23 PROCEDURE — 2709999900 HC NON-CHARGEABLE SUPPLY

## 2021-10-23 PROCEDURE — 99218 HC RM OBSERVATION: CPT

## 2021-10-23 PROCEDURE — 96372 THER/PROPH/DIAG INJ SC/IM: CPT

## 2021-10-23 RX ORDER — CARBAMAZEPINE 200 MG/1
400 TABLET, EXTENDED RELEASE ORAL EVERY 12 HOURS
Status: DISCONTINUED | OUTPATIENT
Start: 2021-10-23 | End: 2021-10-23 | Stop reason: HOSPADM

## 2021-10-23 RX ORDER — LATANOPROST 50 UG/ML
1 SOLUTION/ DROPS OPHTHALMIC
Status: DISCONTINUED | OUTPATIENT
Start: 2021-10-24 | End: 2021-10-23 | Stop reason: HOSPADM

## 2021-10-23 RX ORDER — PRAVASTATIN SODIUM 10 MG/1
10 TABLET ORAL
Qty: 30 TABLET | Refills: 0 | Status: SHIPPED | OUTPATIENT
Start: 2021-10-23

## 2021-10-23 RX ADMIN — ENOXAPARIN SODIUM 40 MG: 100 INJECTION SUBCUTANEOUS at 10:35

## 2021-10-23 RX ADMIN — FAMOTIDINE 20 MG: 20 TABLET ORAL at 17:43

## 2021-10-23 RX ADMIN — FERROUS SULFATE TAB 325 MG (65 MG ELEMENTAL FE) 325 MG: 325 (65 FE) TAB at 10:35

## 2021-10-23 RX ADMIN — FAMOTIDINE 20 MG: 20 TABLET ORAL at 10:35

## 2021-10-23 RX ADMIN — CYANOCOBALAMIN TAB 1000 MCG 1000 MCG: 1000 TAB at 10:35

## 2021-10-23 RX ADMIN — CARBAMAZEPINE 400 MG: 200 TABLET, EXTENDED RELEASE ORAL at 15:30

## 2021-10-23 NOTE — PROGRESS NOTES
Problem: Mobility Impaired (Adult and Pediatric)  Goal: *Acute Goals and Plan of Care (Insert Text)  Outcome: Resolved/Met   PHYSICAL THERAPY EVALUATION AND DISCHARGE    Patient: Hilario Troy (48 y.o. male)  Date: 10/23/2021  Primary Diagnosis: Tegretol toxicity, accidental or unintentional, initial encounter [T42.1X1A]        Precautions: Other (comment) (cardiac monitoring)    PLOF: does not drive, lives alone 3rd floor apartment with elevator, uses rollator or SC, Hx polio affecting right UE/LE    ASSESSMENT :  Based on the objective data described below, the patient presents to skilled Physical Therapy with reported PLOF for functional mobility. He was alert and reclined in bed and reports he has been OOB on one occasion by himself to use bathroom/urinal. He was cooperative for PT consult. He demonstrates MOD I / S  supine to sit to supine and S for sit to stand to sit. He was SBA for ambulation in the halls with RW and no LOB noted for 100 feet. He is motivated for DC home today. He returned to bed and call bell in reach . Patient does not require further skilled intervention at this level of care. PLAN :  Recommendations and Planned Interventions:    No formal PT needs identified at this time. Discharge Recommendations: Home Physical Therapy  Further Equipment Recommendations for Discharge: N/A     SUBJECTIVE:   Patient stated I am doing ok. I hope to go home today.     OBJECTIVE DATA SUMMARY:     Past Medical History:   Diagnosis Date    Glaucoma     History of poliomyelitis     Hyperlipidemia     Seizures ([facility] [address]      Past Surgical History:   Procedure Laterality Date    COLONOSCOPY N/A 1/30/2019    COLONOSCOPY w bx's performed by Latonia Doll MD at [address] HX APPENDECTOMY       Barriers to Learning/Limitations: None  Compensate with: N/A  Home Situation:   Home Situation  Home Environment: Apartment  One/Two Story Residence: Other (Comment) (3rd floor)  Living Alone: Yes  Support Systems: Other Family Member(s)  Patient Expects to be Discharged to[de-identified] Apartment  Current DME Used/Available at Home: [address], [place], straight, Walker, rollator  Critical Behavior:  Neurologic State: Alert  Orientation Level: Oriented X4  Cognition: Appropriate decision making; Appropriate for age attention/concentration; Appropriate safety awareness; Follows commands  Safety/Judgement: Fall prevention  Psychosocial  Patient Behaviors: Calm; Cooperative  Purposeful Interaction: Yes     Strength:    Strength: Within functional limits (B LE)     Tone & Sensation:   Tone: Normal     Sensation: Intact     Range Of Motion:  AROM: Within functional limits (B LE)   History right UE/LE polio with LOM however WFL     Posture:     Functional Mobility:  Bed Mobility:  Rolling: Independent  Supine to Sit: Modified independent;Supervision  Sit to Supine: Modified independent;Supervision  Scooting: Modified independent;Supervision  Transfers:  Sit to Stand: Supervision  Stand to Sit: Supervision     Balance:   Sitting: Intact; Without support  Standing: Intact; With support  Wheelchair Mobility:        Ambulation/Gait Training:  Distance (ft): 100 Feet (ft)  Assistive Device: Walker, rolling  Ambulation - Level of Assistance: Stand-by assistance     Gait Description (WDL): Exceptions to [facility]        Base of Support: Narrowed     Step Length: Left shortened;Right shortened     Stairs: Therapeutic Exercises:      Pain:  Pain level pre-treatment: 0/10   Pain level post-treatment: 0/10  Pain Intervention(s): Medication (see MAR); Rest, Ice, Repositioning    Response to intervention: Nurse notified, See doc flow     Activity Tolerance: Tolerated well. Please refer to the flowsheet for vital signs taken during this treatment.   After treatment:   []         Patient left in no apparent distress sitting up in chair  [x]         Patient left in no apparent distress in bed  [x]         Call bell left within reach  []         Nursing notified  []         Caregiver present  []         Bed alarm activated  []         SCDs applied    COMMUNICATION/EDUCATION:   [x]         Role of Physical Therapy in the acute care setting. [x]         Fall prevention education was provided and the patient/caregiver indicated understanding. [x]         Patient/family have participated as able in goal setting and plan of care. [x]         Patient/family agree to work toward stated goals and plan of care. []         Patient understands intent and goals of therapy, but is neutral about his/her participation. []         Patient is unable to participate in goal setting/plan of care: ongoing with therapy staff.  []         Other:     Thank you for this referral.  Jaiden Potts, PT   Time Calculation: 11 mins      Eval Complexity: History: MEDIUM  Complexity : 1-2 comorbidities / personal factors will impact the outcome/ POC Exam:MEDIUM Complexity : 3 Standardized tests and measures addressing body structure, function, activity limitation and / or participation in recreation  Presentation: LOW Complexity : Stable, uncomplicated  Clinical Decision Making:Low Complexity level of I with functional mobility  Overall Complexity:LOW

## 2021-10-23 NOTE — PROGRESS NOTES
Problem: Self Care Deficits Care Plan (Adult)  Goal: *Acute Goals and Plan of Care (Insert Text)  Outcome: Resolved/Met     Problem: Patient Education: Go to Patient Education Activity  Goal: Patient/Family Education  Outcome: Resolved/Met   OCCUPATIONAL THERAPY EVALUATION/DISCHARGE    Patient: Joselin Curry [de-identified]66 y.o. male)  Date: 10/23/2021  Primary Diagnosis: Tegretol toxicity, accidental or unintentional, initial encounter [T42.1X1A]  Precautions:  Fall    ASSESSMENT AND RECOMMENDATIONS:  Based on the objective data described below, the patient presents with functional AROM and strength of BUEs, good sitting and standing balance, and awareness of safety precautions for completion of self care tasks. Patient sat EOB Mod I. Patient completed UE dressing and simulated LE dressing Mod I. Patient completed STS with Supervision for balance. With additional time, patient progressed to Mod I for simulated bathroom mobility and toileting tasks. Patient demonstrated good awareness of safety precautions and can complete self care tasks safely at Mod I level at this time. OT educated patient on use of pill box to prevent medication overdoses. Patient reported his niece is setting one up for him. Patient has no further need for skilled acute care OT services at this time. Skilled occupational therapy is not indicated at this time. Discharge Recommendations: None  Further Equipment Recommendations for Discharge: Shower chair     SUBJECTIVE:   Patient stated I do everything myself.     OBJECTIVE DATA SUMMARY:     Past Medical History:   Diagnosis Date    Glaucoma     History of poliomyelitis     Hyperlipidemia     Seizures ([facility] [address]      Past Surgical History:   Procedure Laterality Date    COLONOSCOPY N/A 1/30/2019    COLONOSCOPY w bx's performed by Lawanda Blevins MD at [facility]    HX APPENDECTOMY       Barriers to Learning/Limitations: None  Compensate with: N/A    Home Situation:   Home Situation  Home Environment: Apartment  # Steps to Enter:  (elevator)  One/Two Story Residence: Other (Comment) (3rd floor)  Living Alone: Yes  Support Systems: Other Family Member(s)  Patient Expects to be Discharged to[de-identified] Apartment  Current DME Used/Available at Home: Cane, straight, Walker, rollator  Tub or Shower Type: Tub/Shower combination  [x]     Right hand dominant   []     Left hand dominant    Cognitive/Behavioral Status:  Neurologic State: Alert  Orientation Level: Oriented X4  Cognition: Follows commands  Safety/Judgement: Fall prevention;Home safety    Skin: Intact in BUEs  Edema: No edema noted in BUEs    Vision/Perceptual:    Tracking: Able to track stimulus in all quadrants w/o difficulty      Coordination: BUE  Coordination: Within functional limits    Balance:  Sitting: Intact  Standing: Intact    Strength: BUE  Strength:  Within functional limits    Tone & Sensation: BUE  Tone: Normal  Sensation: Intact    Range of Motion: BUE  AROM: Within functional limits    Functional Mobility and Transfers for ADLs:  Bed Mobility:  Rolling: Independent  Supine to Sit: Modified independent  Sit to Supine: Modified independent  Scooting: Modified independent  Transfers:  Sit to Stand: Supervision  Stand to Sit: Modified independent   Toilet Transfer : Modified independent (simulated)   Bathroom Mobility: Modified Independent    ADL Assessment:  Feeding: Modified independent    Oral Facial Hygiene/Grooming: Modified Independent    Bathing: Supervision Recommend shower chair to increase independence    Upper Body Dressing: Modified independent    Lower Body Dressing: Modified independent    Toileting: Modified independent      ADL Intervention:  Educated patient on home safety  Educated patient on role of OT nelly cute care  Cognitive Retraining  Safety/Judgement: Fall prevention;Home safety    Pain:  Pain level pre-treatment: 0/10   Pain level post-treatment: 0/10   Pain Intervention(s): N/A    Activity Tolerance:   Good  Please refer to the flowsheet for vital signs taken during this treatment. After treatment:   []  Patient left in no apparent distress sitting up in chair  [x]  Patient left in no apparent distress in bed  [x]  Call bell left within reach  []  Nursing notified  []  Caregiver present  []  Bed alarm activated    COMMUNICATION/EDUCATION:   [x]      Role of Occupational Therapy in the acute care setting  [x]      Home safety education was provided and the patient/caregiver indicated understanding. [x]      Patient/family have participated as able and agree with findings and recommendations. []      Patient is unable to participate in plan of care at this time. Thank you for this referral.  [facility], OTR/L  Time Calculation: 17 mins      Eval Complexity: History: LOW Complexity : Brief history review ; Examination: LOW Complexity : 1-3 performance deficits relating to physical, cognitive , or psychosocial skils that result in activity limitations and / or participation restrictions ;    Decision Making:LOW Complexity : No comorbidities that affect functional and no verbal or physical assistance needed to complete eval tasks

## 2021-10-23 NOTE — DISCHARGE SUMMARY
Discharge Summary     Patient: Alexi Chi MRN: [id number]  SSN: [id number]    YOB: 1950  Age: 66 y.o. Sex: male       Admit Date: 10/21/2021    Discharge Date: 10/23/2021      Admission Diagnoses: Tegretol toxicity, accidental or unintentional, initial encounter [T42.1X1A]    Discharge Diagnoses:   Problem List as of 10/23/2021 Date Reviewed: 10/22/2021        Codes Class Noted - Resolved    Tegretol toxicity, accidental or unintentional, initial encounter ICD-10-CM: T42.1X1A  ICD-9-CM: 966.3, E855.0  10/22/2021 - Present        B12 deficiency ICD-10-CM: E53.8  ICD-9-CM: 266.2  7/24/2019 - Present        Closed displaced intertrochanteric fracture of right femur ([facility] ICD-10-CM: J02.566U  ICD-9-CM: 820.21  7/21/2019 - Present        Femur fracture, right ([facility] ICD-10-CM: S72.91XA  ICD-9-CM: 821.00  7/20/2019 - Present        Epilepsy ([facility] ICD-10-CM: G40.909  ICD-9-CM: 345.90  7/12/2017 - Present        Congenital hemiparesis ([facility] ICD-10-CM: G80.8  ICD-9-CM: 343.1  7/12/2017 - Present        RESOLVED: Hx of post-polio syndrome ICD-10-CM: 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  male with a history of hyperlipidemia, glaucoma, poliomyelitis/ congenital anomalies  with some weakness  Rt arm and Rt leg,  H/o , and h/o low iron presented to the ER with unsteadiness of gait and \"feeling drunk. \" Patient states he took his Tegretol and immediately felt unsteady. His symptoms improved in the ER.      He denies taking Movantik with his Tegretol. He was supposed to follow up with neurology, but has not followed up with them yet.   Denies headache, fever, chest pain, shortness of breath, heartburn, abdominal pain, nausea, vomiting, diarrhea, and neuropathy.     Initial labs in the ER  WBC 7.4, Hgb 13.9, Platelets 999  Na 135, Potassium 4.0, Glucose 99  BUN 20, Cr 0.91  CK 97, CK-MB <1.0  Troponin I less than 0.02  Triglyceride 66, Total cholesterol 231, , VLDL 13.2, LDL 97  Vitamin B12 473, Folate 14.1, Iron 100, TIBC 247, Iron % Saturation 40  Carbamazepine 15.2-->16.5     Urinalysis showed: trace blood and few bacteria, otherwise unremarkable     Patient had work up in the ER (see below.)     Hospital Course:     Epilepsy/Tegretol toxicity  - seen  By neurology most likley pt taking extra med discussed with his niece and she is in agreement discussed with niece make  sure pt taking the rt dise   - Carbamazepine 15.2-->16.5-  -      Hyperlipidemia  -Pravastatin 10 mg PO daily at bedtime  - Check cardiac panel Q8H  - Triglyceride 66, Total cholesterol 231, , VLDL 13.2, LDL 97     Glaucoma  - Xalantan 0.005% ophthalmic solution 1 drop both eyes daily     B12 deficiency/Low Iron  - Cyanocobalamin 1000 mcg PO daily  - Ferrous Sulfate 325 mg PO daily with breakfast  - Vitamin B12 473, Folate 14.1, Iron 100, TIBC 247, Iron % Saturation 40     Pt had pt and ot and swallow evaluation     Consults: Neurology    Significant Diagnostic Studies:     EKG showed:  - Normal sinus rhythm   - Possible Left atrial enlargement   - Left axis deviation   - Abnormal ECG   - When compared with ECG of 20-JUL-2019 14:14, No significant change was found      CT HEAD WO CONTRAST (10/21/2021)  IMPRESSION  1. No acute intracranial pathology.    2. 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       Physical Examination:   General:  Alert,no distress, appears stated age. Head:  Normocephalic, without obvious abnormality, atraumatic. Eyes:  Conjunctivae/corneas clear. PERRL, EOMs intact. Nose: Nares normal. No drainage or sinus tenderness. Throat: Lips, mucosa, and tongue normal   Neck: Supple, symmetrical, trachea midline, no adenopathy, thyroid: no enlargement/tenderness/nodules, no carotid bruit and no JVD. Back:   ROM normal. No CVA tenderness.  Possible sebaceous cyst on right chest  (non painful) sent for surgery as outpatient   Lungs: Clear to auscultation bilaterally. Chest wall:  No tenderness or deformity. Heart:  Regular rate and rhythm, S1, S2 normal, no murmur, click, rub or gallop. Abdomen: Soft, non-tender. Bowel sounds normal. No masses,  No organomegaly. Extremities: RUE weakness, no cyanosis or edema. Pulses: 2+ and symmetric all extremities. Skin: Skin color, texture, turgor normal. No rashes or lesions   Neurologic: CNII-XII intact. No focal motor or sensory deficit.          Disposition: home with home health     Discharge Medications:   Current Discharge Medication List      CONTINUE these medications which have CHANGED    Details   pravastatin (PRAVACHOL) 10 mg tablet Take 1 Tablet by mouth nightly. Qty: 30 Tablet, Refills: 0         CONTINUE these medications which have NOT CHANGED    Details   carBAMazepine XR (TEGretol XR) 400 mg SR tablet take 1 tablet by mouth twice a day  Qty: 60 Tablet, Refills: 5    Associated Diagnoses: Convulsions, unspecified convulsion type (HCC)      acetaminophen (TYLENOL) 650 mg TbER Take 1 Tab by mouth every four (4) hours as needed for Pain or Fever. Qty: 90 Tab, Refills: 1    Associated Diagnoses: Chronic right shoulder pain      bisacodyl (DULCOLAX) 10 mg suppository Insert 10 mg into rectum daily as needed for Other (if no bowel movement >48hrs). Qty: 10 Each, Refills: 0      latanoprost (XALATAN) 0.005 % ophthalmic solution place 1 drop into both eyes at bedtime  Qty: 2.5 mL, Refills: 1    Associated Diagnoses: Open-angle glaucoma, unspecified glaucoma stage, unspecified laterality, unspecified open-angle glaucoma type      cyanocobalamin 1,000 mcg tablet Take 1 Tab by mouth daily. Qty: 30 Tab, Refills: 0      famotidine (PEPCID) 20 mg tablet Take 1 Tab by mouth two (2) times a day. Qty: 60 Tab, Refills: 0      ferrous sulfate 325 mg (65 mg iron) tablet Take 1 Tab by mouth daily (with breakfast).   Qty: 30 Tab, Refills: 0         STOP taking these medications naloxegol (MOVANTIK) 25 mg tab tablet Comments:   Reason for Stopping:               Activity: Activity as tolerated  Diet: Cardiac Diet  Wound Care: None needed    Follow-up Appointments   Procedures    FOLLOW UP VISIT Appointment in: One Week Follow up Dr Feliz Leonardo in one week follow up neurology dr Haseeb Verduzco in 2 weeks     Follow up Dr Feliz Leonardo in one week follow up neurology dr Haseeb Verduzco in 2 weeks     Standing Status:   Standing     Number of Occurrences:   1     Order Specific Question:   Appointment in     Answer: One Week     Time for discharge more than 40 minutes included discussion with pt his niece.   Review chart med reconciliation and documentation     Signed By: Jaleel Whatley MD     October 23, 2021

## 2021-10-23 NOTE — CALL BACK NOTE
I reviewed the patient Tegretol level, now is quite low. I recommend resuming Tegretol  mg twice a day today.  I I placed the order    Lyle Hernandez MD  Adult Neurologist

## 2021-10-23 NOTE — PROGRESS NOTES
Discharge and home health orders noted. Pt is agreeable to [facility]. He stated his niece will be picking him up. Home health orders sent to [facility]. Left a message for Faviola of [address]: Notified Faviola of [facility] of home health orders.               ARIADNE Bell RN  Care Management  Pager: [phone]

## 2021-10-23 NOTE — DISCHARGE INSTRUCTIONS
Patient Education        Learning About Epilepsy  What is epilepsy? Epilepsy is a common condition that causes repeated seizures. Seizures may cause problems with muscle control, movement, speech, vision, or awareness. They usually don't last very long, but they can be scary. Treatment usually works to control and reduce seizures. What causes it? Many things can cause epilepsy. It may develop as a result of a head injury or a condition that causes damage to the brain, like a tumor or stroke. Genes may also play a role. But you don't have to have a family history to develop it. Often doctors don't know what causes epilepsy. What are the symptoms? The main symptom of epilepsy is repeated seizures that happen without warning. There are different kinds of seizures. You may notice strange smells or sounds. You may lose control of your muscles. Or your body may twitch or jerk. Your symptoms will depend on the type of seizure you have. How is it diagnosed? Diagnosing epilepsy can be hard. Your doctor will ask questions to find out what happened just before, during, and right after a seizure. Your doctor will examine you. You'll have some tests, such as an electroencephalogram. This information can help your doctor decide what kind of seizures you have and if you have epilepsy. How is it treated? You can take medicines to control and reduce seizures. Which type you use depends on the type of seizure. You and your doctor will need to find the right combination, schedule, and dose of medicine. If medicine alone doesn't help, your doctor may suggest a special diet or surgery to help reduce seizures. How can you care for yourself at home? To control your seizures, you need to follow your treatment plan. If you take medicine to control seizures, you must take it exactly as prescribed. The medicine works only if you take the right amount on the schedule your doctor sets up.  Following this schedule keeps the right level of medicine in your body. Even missing just a few doses can allow seizures to happen. You might be on a special ketogenic diet. If so, you'll need to follow the diet exactly for it to help prevent seizures. As you follow your treatment plan, also try to figure out and avoid things that may make you more likely to have a seizure. These may include:  · Not getting enough sleep. · Using drugs or alcohol. · Being stressed. · Skipping meals. If you keep having seizures despite treatment, keep a record of them. Note the date, time of day, and any details about the seizure that you can remember. Your doctor can use this information to plan or adjust your medicine or other treatment. The record can also help your doctor find out what kinds of seizures you are having. If you have epilepsy:  · Be sure that any doctor who treats you knows that you have epilepsy. And let the doctor know what medicines you take, if any. · Wear a medical ID bracelet. If you have a seizure or accident that leaves you unconscious or unable to speak for yourself, the bracelet will let those who are treating you know that you have epilepsy. It will also list any medicines you take to control your seizures. That way, you won't be given any medicines that will react badly with those already in your body. · Ask your doctor if it's safe for you to do things like drive or swim. · Create a seizure first-aid plan with your friends and family. The plan will help them know how to help you. The kind of plan you need can depend on the kind of seizures you have. Your doctor can tell you more about this. Follow-up care is a key part of your treatment and safety. Be sure to make and go to all appointments, and call your doctor if you are having problems. It's also a good idea to know your test results and keep a list of the medicines you take. Where can you learn more?   Go to http://www.gray.com/  Enter E175 in the search box to learn more about \"Learning About Epilepsy. \"  Current as of: April 8, 2021               Content Version: 13.0  © 0058-4564 [employer]. Care instructions adapted under license by [employer] (which disclaims liability or warranty for this information). If you have questions about a medical condition or this instruction, always ask your healthcare professional. [address] any warranty or liability for your use of this information. Patient Education        Vitamin B12 Deficiency: Care Instructions  Overview    A vitamin B12 deficiency means that your body doesn't have enough of this vitamin. You need vitamin B12 to keep red blood cells and nerve cells healthy. Not enough B12 can cause anemia. It can also damage nerves and cause trouble with memory and thinking. Many things can cause low levels of vitamin B12. They include:  · Not getting enough of this vitamin through food. · An autoimmune problem, like pernicious anemia. · Weight-loss surgery, like gastric bypass. · Long-term use of heartburn medicines. Low levels of B12 may not cause symptoms. But symptoms may include fatigue, depression, and thinking or memory problems. You may have tingling in your hands or feet and changes in the way you walk. Treatment depends on the reason for low vitamin B12. Eating more foods rich in B12 may be enough. Or you might take the vitamin as a pill, as shots, or as nasal spray. How can you care for yourself? · Take vitamin B12 as your doctor recommends. · Go to your appointments if you are getting B12 shots. · Eat more foods rich in vitamin B12. Examples are:  ? Animal products. These include meat, seafood, milk products, poultry, and eggs. ? Foods that have B12 added. These are called fortified foods. They include soy products, nutritional yeast, and dry cereals.   · Work with a nutritionist or dietitian if you need help getting more vitamin B12 from food.  · Talk to your doctor about stopping medicines if they are adding to your B12 deficiency. When should you call for help? Call 911  anytime you think you may need emergency care. For example, call if:    · You passed out (lost consciousness). Call your doctor now or seek immediate medical care if:    · You are dizzy or lightheaded, or you feel like you may faint. Watch closely for changes in your health. Be sure to call your doctor if:    · You are confused or can't think clearly.     · You don't get better as expected. Where can you learn more? Go to http://www.gray.com/  Enter B352 in the search box to learn more about \"Vitamin B12 Deficiency: Care Instructions. \"  Current as of: April 29, 2021               Content Version: 13.0  © 2006-2021 [employer]. Care instructions adapted under license by [employer] (which disclaims liability or warranty for this information). If you have questions about a medical condition or this instruction, always ask your healthcare professional. [place] [address] any warranty or liability for your use of this information.

## 2021-10-24 ENCOUNTER — HOME CARE VISIT (OUTPATIENT)
Dept: SCHEDULING | Facility: HOME HEALTH | Age: 71
End: 2021-10-24
Payer: MEDICARE

## 2021-10-24 PROCEDURE — 400013 HH SOC

## 2021-10-24 PROCEDURE — 3331090001 HH PPS REVENUE CREDIT

## 2021-10-24 PROCEDURE — 3331090002 HH PPS REVENUE DEBIT

## 2021-10-24 PROCEDURE — 400018 HH-NO PAY CLAIM PROCEDURE

## 2021-10-24 PROCEDURE — G0299 HHS/HOSPICE OF RN EA 15 MIN: HCPCS

## 2021-10-24 NOTE — CASE COMMUNICATION
SOC completed on patient on 10/24/21. SN/PT/OT all ordered and accepted by patient/caregiver. I went over using pill box to prevent medication errors in the future, patient/caregiver agreed. Patient ambulating well in his home, needs rollator when he goes out. Thank nessa.

## 2021-10-25 ENCOUNTER — HOME CARE VISIT (OUTPATIENT)
Dept: SCHEDULING | Facility: HOME HEALTH | Age: 71
End: 2021-10-25
Payer: MEDICARE

## 2021-10-25 VITALS
TEMPERATURE: 97.4 F | HEART RATE: 82 BPM | SYSTOLIC BLOOD PRESSURE: 130 MMHG | RESPIRATION RATE: 16 BRPM | DIASTOLIC BLOOD PRESSURE: 74 MMHG | OXYGEN SATURATION: 99 %

## 2021-10-25 VITALS — DIASTOLIC BLOOD PRESSURE: 70 MMHG | HEART RATE: 78 BPM | OXYGEN SATURATION: 98 % | SYSTOLIC BLOOD PRESSURE: 140 MMHG

## 2021-10-25 PROCEDURE — G0151 HHCP-SERV OF PT,EA 15 MIN: HCPCS

## 2021-10-25 PROCEDURE — 3331090001 HH PPS REVENUE CREDIT

## 2021-10-25 PROCEDURE — 3331090002 HH PPS REVENUE DEBIT

## 2021-10-25 NOTE — PROGRESS NOTES
Received report on pt.from off going RN. Resting quietly in bed on rounds. Denies c/o pain or SOB at this time. No acute distress noted. Call bell at side. Will cont to monitor for any changes in status. 1400 ambulating in hallway w/o noted distress. [address] Pt given discharge instructions. transported to main entrance per w/c by Delbert Jorgensen RN to be transported home by his niece. No distress noted at time of transport.

## 2021-10-25 NOTE — Clinical Note
Therapy Functional Score Assessment  Question                                  Score   Grooming      5                   Upper Dressing    5             Lower Dressing      5           Bathing                     5         Toilet Transfer             5              Transfer                        0    [id number] Mobility - Lying to sitting on side of bed  SOC    6       Goal 6  1860 Ambulation  1                Pain interfering with activity   0  Est number therapy visits     1

## 2021-10-25 NOTE — HOME HEALTH
Skilled services/Home bound verification: Patient/caregiver understand homebound status. Skilled Reason for admission/summary of clinical condition: Recent hospitalization for Tegretol overdose. This patient is homebound for the following reasons Requires considerable and taxing effort to leave the home , Requires the assistance of 1 or more persons to leave the home  and Only leaves the home for medical reasons or Hol[employer] services and are infrequent and of short duration for other reasons . Caregiver: Sister Vikki Anne is caregiver, she is available most days and some evening, she  Assists with ADLs, Medication management, Transportation to appointments, Meal prep and assists with ambulation. Medications reconciled and all medications are available in the home this visit. Medications  are effective at this time. High risk medication teaching done regarding his Tegretol, as it is not usually a High Risk Drug I stressed the importance of taking as directed. Sister is now managing his medications for a while. Home health supplies by type and quantity ordered/delivered this visit include: n/a    Patient education provided this visit to include: Patient is a fall risk, went over the need of having someone with him when ambulating, keep hallways and living areas free of clutter and throw rugs. Taught CG to keep patient on his side if he has seizure, keep him safe until seizure passes,  time seizure and call 911. I went over the importance of taking medications as ordered. To decrease errors in either omission or overdosing, patient is now using a medication daily box, his sister Vikki Anne is setting this up with all medications in it every Sunday for the week. Patient/caregiver degree of understanding:good    Home exercise program/Homework provided: Continue to monitor for changes in mental status, Take medications as prescribed. Ambulate with assistance until he is stronger.   See PCP for follow up. Eat a healthy high protein diet. Discussed the importance of staying well hydrated with water 6-8 glasses a day. Discussed s/s of infection to monitor for, s/s of UTI, who to report to/when. Instructed cg to notify staff/md/seek tx if complications occur. Patient instructed to maintain clear pathways in home and to minimize clutter to prevent falls from occurring/minimize fall potential.   Patient needing a well balanced diet with the 5 food groups, patient to increase fiber in diet, fresh fruits and vegetables, whole grains and increase water intake. I went over the importance of taking all prescriptions as ordered. We discussed the signs of infection and when to call MD.  We discussed the high risk for falls and ways to prevent falls in the future, Patient / Caregiver are  agreeable to plan of care at this time. Physician Jeovany Salinas notified of patient admission to home health and plan of care including anticipated frequency of visits and treatments/interventions/modalities of SN/PT/OT    Discharge planning discussed with patient and caregiver. Discharge planning as follows: Will discharge from nursing when education is complete and patient is medically stable. Jose L Boo Pt/Caregiver did verbalize understanding of discharge planning. Next MD appointment TBD with Jeovany Salinas MD.  Perla Hamilton encouraged/instructed to keep appointment as lack of follow through with physician appointment could result in discontinuation of home care services for non-compliance.

## 2021-10-26 ENCOUNTER — HOME CARE VISIT (OUTPATIENT)
Dept: HOME HEALTH SERVICES | Facility: HOME HEALTH | Age: 71
End: 2021-10-26
Payer: MEDICARE

## 2021-10-26 PROCEDURE — 3331090001 HH PPS REVENUE CREDIT

## 2021-10-26 PROCEDURE — 3331090002 HH PPS REVENUE DEBIT

## 2021-10-26 NOTE — HOME HEALTH
Skilled Reason for admission/summary of clinical condition: Tegretol toxicity, accidental or unintentional, initial encounter . HHPT medically necessary to address  dx and clinical findings at initial PT evaluation patient presents at mod I for transfers and bed mobility, amb independently without AD and  wnl balance   Patient does not require skilled PT at this time   Caregiver involvement: lives with family , who assists as needed with meds, meals, functional mobility, transport to MD appts. Medications reconciled and all medications are available in the home this visit. Medications  are effective at this time. ROM: at initial assessment:   [facility]  Strength:at initial assessment:   L hip flexion 4-/5, L knee flexion 4-/5,  L knee extension 4-/5,    R hip flexion 4-/5, R knee flexion 4-/5,  R knee extension 4-/5,    bed mobility independently  Transfers: pt transferred sit to stand with independently from sofa   Gait training: PT instructed pt in amb 110ft without AD in the home, family stated that pt amb with cane when amb outside. Balance: Tinetti 21/28  Patient education provided this visit: BLE strengthening HEP,  fall prevention  Patient/caregiver degree of understanding:good  Home exercise program/Homework provided: 10 reps 3x/daily    seated:  LAQ hip flexion  hip add   HR/TR  X 15  standing: B hip flex, heel raises hamstring curls  X15  initiated standing and seated therex BLE  to be performed 2-3 per day  pt and caregiver verbalize understanding  Pt/Caregiver instructed on plan of care and are agreeable to plan of care at this time. Discharge planning discussed with patient and caregiver. Discharge planning as follows:  No skilled PT at this time, pt and caregiver made aware and agree  Next MD appointment Pending  Patient/caregiver encouraged/instructed to keep appointment as lack of follow through with physician appointment could result in discontinuation of home care services for non-compliance.

## 2021-10-27 ENCOUNTER — HOME CARE VISIT (OUTPATIENT)
Dept: SCHEDULING | Facility: HOME HEALTH | Age: 71
End: 2021-10-27
Payer: MEDICARE

## 2021-10-27 PROCEDURE — 3331090002 HH PPS REVENUE DEBIT

## 2021-10-27 PROCEDURE — G0299 HHS/HOSPICE OF RN EA 15 MIN: HCPCS

## 2021-10-27 PROCEDURE — 3331090001 HH PPS REVENUE CREDIT

## 2021-10-28 ENCOUNTER — HOME CARE VISIT (OUTPATIENT)
Dept: SCHEDULING | Facility: HOME HEALTH | Age: 71
End: 2021-10-28
Payer: MEDICARE

## 2021-10-28 VITALS
DIASTOLIC BLOOD PRESSURE: 70 MMHG | OXYGEN SATURATION: 96 % | SYSTOLIC BLOOD PRESSURE: 138 MMHG | TEMPERATURE: 98.3 F | RESPIRATION RATE: 16 BRPM | HEART RATE: 74 BPM

## 2021-10-28 PROCEDURE — 3331090001 HH PPS REVENUE CREDIT

## 2021-10-28 PROCEDURE — G0152 HHCP-SERV OF OT,EA 15 MIN: HCPCS

## 2021-10-28 PROCEDURE — 3331090002 HH PPS REVENUE DEBIT

## 2021-10-28 NOTE — Clinical Note
OT evaluation completed 10.28.21. pt and caregiver declining OT due to being at functional baseline. pt performing ADL/IADL tasks at prior level of I/JYOTI , ambulation and transfers to perform functional tasks at prior level of I/MOD I, and demonstrates good safety/technique. pt caregiver providing same level of A for pt as prior to hospitalization. pt demonstrated I UB HEP. pt presents with good BUE AROM and functional strength of 4 - 5/5 MMT. D/C OT due to no skilled need secondary to pt at prior functional level. pt and caregiver agree D/C OT. D/C pt to care of Dr Jose Meyers.

## 2021-10-28 NOTE — HOME HEALTH
Skilled reason for visit: med management, vitals    Caregiver involvement: caregiver not present. Medications reviewed and all medications are available in the home this visit. The following education was provided regarding medications, medication interactions, and look alike medications (specify): Educated patient on tegretol, signs and symptoms of tegretol toxicity. Medications  are effective at this time. Home health supplies by type and quantity ordered/delivered this visit include: n/a    Patient education provided this visit: importance of compliance with medication regimen, follow up with MD as ordered, safety and fall prevention      Skilled Care Performed this visit: med management/education. vitals    Patient's Progress towards personal goals:  Pt remains free of falls and free of tegretol toxicit. Denies any coordination issues or difficulty ambulating outside of baseline. Home exercise program: Ambulate as tolerated. Continued need for the following skills: Nursing    Plan for next visit: med education/ assessment of effectiveness. The following discharge planning was discussed with the pt/caregiver: DC when SN no longer needed.

## 2021-10-28 NOTE — Clinical Note
Therapy Functional Score Assessment  Question   Score   Grooming  0       Upper Dressing 0      Lower Dressing 0      Bathing  1      Toilet Transfer  0    Transfer  0            Ambulation  1   Dyspnea                     1       Pain Interfering with activity 3  Est number therapy visits      1

## 2021-10-29 VITALS
SYSTOLIC BLOOD PRESSURE: 142 MMHG | RESPIRATION RATE: 17 BRPM | DIASTOLIC BLOOD PRESSURE: 76 MMHG | HEART RATE: 75 BPM | OXYGEN SATURATION: 98 % | TEMPERATURE: 98.6 F

## 2021-10-29 PROCEDURE — 3331090001 HH PPS REVENUE CREDIT

## 2021-10-29 PROCEDURE — 3331090002 HH PPS REVENUE DEBIT

## 2021-10-29 NOTE — HOME HEALTH
Clinical Condition per EPIC:  \"HPI: Alexx Lee is a 66 y.o.  male with a history of hyperlipidemia, glaucoma, poliomyelitis with some weakness Rt arm and Rt leg, seizures, and h/o low iron presented to the ER yesterday with unsteadiness of gait and \"feeling drunk. \" Patient states he took his Tegretol and immediately felt unsteady. His symptoms improved in the ER. Today he states \"I feel good and want to go home. \" Reports dizziness and weakness in his right upper extremity. He knows the month is October and the year is 2021. He denies taking Movantik with his Tegretol. He was supposed to follow up with neurology, but has not followed up with them yet. On his last admission, he was on Lovenox Patient is able to pass urine. Denies headache, fever, chest pain, shortness of breath, heartburn, abdominal pain, nausea, vomiting, diarrhea, and neuropathy. Past Medical History:  Diagnosis Date  o Glaucoma    o History of poliomyelitis    o Hyperlipidemia    o Seizures ([facility] [address]          Past Surgical History:  Procedure Laterality Date  o COLONOSCOPY N/A 1/30/2019    COLONOSCOPY w bx's performed by Teresa Garza MD at [place] [id number] APPENDECTOMY\"    . SUBJECTIVE:  Pt stated \"I'm not having any problems. I'm doing everything just like I was before this happened. I don't need Occupational Therapy\" pt answered door without AD upon OT arrival. pt agreed tx. pt declining skilled OT at this time. pt niece present during evaluation and agrees D/C OT due to no skilled need. CAREGIVER INVOLVEMENT: pt family provides A heavy IADL tasks, shopping, transportation, and A with medications. MEDICATION RECONCILIATION: OT reconcilled medications with pt with no changes   DME ORDERED/RECOMMENEDED: NA   .  OBJECTIVE:  BATHING: MOD I, pt demonstrated bathing standing tub shower use grab bar PRN with good supported dynamic standing use SLS while performing LB bathing.    TOILETING: MOD I  UB DRESSING: I  LB DRESSING: I, pt demonstrated I use weight shift techinque bringing LE onto opposite LE  GROOMING: I  FEEDING: I  .  pt reports Modified Kavitha RPE 1/10 after performing ambulation, transfers, and I/ADL assessment. .  OT instructed/demonstrated pt the following with good understanding:    - energy conservation while performing bathing, dressing, and setup such as set clothing out night before, gather items required to perform task in one trip, sit while performing tasks, take rest breaks as needed, perform shower/bathing on days with no other appointments or activities scheduled, etc.     - pt is to perform bathing/dressing tasks sitting, when possible, for safety/fall prevention.     - while sitting perform weight shift technique bringing LE contra laterally onto opposite LE while performing LB bathing/dressing for safety and fall prevention. pt demonstrated I technique. - pt instructed/demonstrated one handed technique while standing to perform functional tasks with use grab ashley for increased stability, fall prevention, and safety while standing. -ADL training performed for improved body mechanics, safety, and technique for reduced risk of falls and improved functional level and participation of tasks. [place] IADL: pt obtained items from refrigerator and cabinets with I  .  AMBULATION: pt amb without AD with I reciprocal pattern no LOB. .  EOB/BED TRANSFER: supine to/from sit with I, sit/stand EOB with I.   CHAIR: MOD I use BUE  TOILET 3 in 1 BSC: MOD I use BUE  TUB SHOWER: MOD I use grab bar. pt demonstrated good safety and technique all transfers. .  -gait and transfer training performed for improved body mechanics and technique for fall prevention and safety while performing ADL/IADL tasks.     PATIENT EDUCATION PROVIDED THIS VISIT: UB HEP, OT role, energy conservation, fall prevention/safety training ADL/IADL tasks, continue diet and medications as instructed per MD, consult MD or urgent care for medical assistance as opposed to ER unless situation emergent. REHAB POTENTIAL: pt and caregiver declined OT due to being at functional baseline. HOME EXERCISE PROGRAM: Written HEP of the following issued. pt instructed/demonstrated BUE shoulder press, shoulder flexion, shoulder abduction, shoulder extension, chest press, elbow flexion/extension x 10, x 2 per day. pt demonstrated I UB HEP. UB HEP for pt to maintain functional endurance and strength to perform ADL/IADL tasks and ambulation/transfers to perform tasks with safety and for fall prevention. ASSESSMENT: OT evaluation completed 10.28.21. pt and caregiver declining OT due to being at functional baseline. pt performing ADL/IADL tasks at prior level of I/JYOTI , ambulation and transfers to perform functional tasks at prior level of I/MOD I, and demonstrates good safety/technique. pt caregiver providing same level of A for pt as prior to hospitalization. pt demonstrated I UB HEP. pt presents with good BUE AROM and functional strength of 4 - 5/5 MMT. D/C OT due to no skilled need secondary to pt at prior functional level. pt and caregiver agree D/C OT. D/C pt to care of Dr Lori Ortega. PLAN: D/C OT per pt decline and due to no skilled need. DISCHARGE PLANNING DISCUSSED WITH PT/CAREGIVER: D/C pt to self and family care under MD supervision. pt and caregiver verbalized understanding and agree POC.

## 2021-10-30 PROCEDURE — 3331090001 HH PPS REVENUE CREDIT

## 2021-10-30 PROCEDURE — 3331090002 HH PPS REVENUE DEBIT

## 2021-10-31 PROCEDURE — 3331090002 HH PPS REVENUE DEBIT

## 2021-10-31 PROCEDURE — 3331090001 HH PPS REVENUE CREDIT

## 2021-11-01 ENCOUNTER — HOME CARE VISIT (OUTPATIENT)
Dept: SCHEDULING | Facility: HOME HEALTH | Age: 71
End: 2021-11-01
Payer: MEDICARE

## 2021-11-01 VITALS
DIASTOLIC BLOOD PRESSURE: 68 MMHG | HEART RATE: 69 BPM | RESPIRATION RATE: 16 BRPM | OXYGEN SATURATION: 98 % | SYSTOLIC BLOOD PRESSURE: 114 MMHG | TEMPERATURE: 98.5 F

## 2021-11-01 PROCEDURE — G0299 HHS/HOSPICE OF RN EA 15 MIN: HCPCS

## 2021-11-01 PROCEDURE — 3331090001 HH PPS REVENUE CREDIT

## 2021-11-01 PROCEDURE — 3331090002 HH PPS REVENUE DEBIT

## 2021-11-01 NOTE — HOME HEALTH
Skilled reason for visit: med management/ education, assessment, vitals    Caregiver involvement:  available for ALDs, meal prep, med management and transportation    Medications reviewed and all medications are available in the home this visit. The following education was provided regarding medications, medication interactions, and look alike medications (specify): Educated patient on tegretol, signs and symptoms of tegretol toxicity. Medications  are effective at this time. Home health supplies by type and quantity ordered/delivered this visit include: n/a    Patient education provided this visit: importance of compliance with medication regimen, follow up with MD as ordered, safety and fall prevention. Josselyn Munoz informed me that the issue that caused the tegretol toxicity was the pharmacy put the pills in 2 bottles instead of one and the patient believed he was supposed to take one from each bottle. Educated patient on reading labels and dosage to ensure the proper dose is taken. Pt has medication boxes set up, morning pills in the kitchen and evening pills in the bedroom. Skilled Care Performed this visit: med management/education. vitals    Patient's Progress towards personal goals:  Pt remains free of falls and free of tegretol toxicit. Denies any coordination issues or difficulty ambulating outside of baseline. Home exercise program: Ambulate as tolerated. Continued need for the following skills: Nursing    Plan for next visit: med education/ assessment of effectiveness. The following discharge planning was discussed with the pt/caregiver: DC when SN no longer needed.

## 2021-11-02 PROCEDURE — 3331090002 HH PPS REVENUE DEBIT

## 2021-11-02 PROCEDURE — 3331090001 HH PPS REVENUE CREDIT

## 2021-11-03 PROCEDURE — 3331090002 HH PPS REVENUE DEBIT

## 2021-11-03 PROCEDURE — 3331090001 HH PPS REVENUE CREDIT

## 2021-11-04 ENCOUNTER — HOME CARE VISIT (OUTPATIENT)
Dept: SCHEDULING | Facility: HOME HEALTH | Age: 71
End: 2021-11-04
Payer: MEDICARE

## 2021-11-04 VITALS
SYSTOLIC BLOOD PRESSURE: 118 MMHG | HEART RATE: 79 BPM | DIASTOLIC BLOOD PRESSURE: 72 MMHG | TEMPERATURE: 98.8 F | RESPIRATION RATE: 16 BRPM | OXYGEN SATURATION: 96 %

## 2021-11-04 PROCEDURE — 3331090001 HH PPS REVENUE CREDIT

## 2021-11-04 PROCEDURE — 3331090002 HH PPS REVENUE DEBIT

## 2021-11-04 PROCEDURE — G0299 HHS/HOSPICE OF RN EA 15 MIN: HCPCS

## 2021-11-04 NOTE — HOME HEALTH
Skilled reason for visit: med management/ education, assessment, vitals    Caregiver involvement:  available for ALDs, meal prep, med management and transportation    Medications reviewed and all medications are available in the home this visit. The following education was provided regarding medications, medication interactions, and look alike medications (specify): Educated patient on tegretol, signs and symptoms of tegretol toxicity. Medications  are effective at this time. Home health supplies by type and quantity ordered/delivered this visit include: n/a    Patient education provided this visit: importance of compliance with medication regimen, follow up with MD as ordered, safety and fall prevention. Educated patient on reading labels and dosage to ensure the proper dose is taken. Pt has medication boxes set up, morning pills in the kitchen and evening pills in the bedroom. Skilled Care Performed this visit: med management/education. vitals    Patient's Progress towards personal goals: Good    Home exercise program: Ambulate as tolerated. Continued need for the following skills: Nursing    Plan for next visit: DC    The following discharge planning was discussed with the pt/caregiver: DC on next visit.

## 2021-11-05 PROCEDURE — 3331090001 HH PPS REVENUE CREDIT

## 2021-11-05 PROCEDURE — 3331090002 HH PPS REVENUE DEBIT

## 2021-11-06 PROCEDURE — 3331090002 HH PPS REVENUE DEBIT

## 2021-11-06 PROCEDURE — 3331090001 HH PPS REVENUE CREDIT

## 2021-11-07 PROCEDURE — 3331090002 HH PPS REVENUE DEBIT

## 2021-11-07 PROCEDURE — 3331090001 HH PPS REVENUE CREDIT

## 2021-11-08 ENCOUNTER — HOME CARE VISIT (OUTPATIENT)
Dept: SCHEDULING | Facility: HOME HEALTH | Age: 71
End: 2021-11-08
Payer: MEDICARE

## 2021-11-08 PROCEDURE — 3331090001 HH PPS REVENUE CREDIT

## 2021-11-08 PROCEDURE — G0299 HHS/HOSPICE OF RN EA 15 MIN: HCPCS

## 2021-11-08 PROCEDURE — 3331090002 HH PPS REVENUE DEBIT

## 2021-11-09 PROCEDURE — 3331090001 HH PPS REVENUE CREDIT

## 2021-11-09 PROCEDURE — 3331090002 HH PPS REVENUE DEBIT

## 2021-11-10 PROCEDURE — 3331090001 HH PPS REVENUE CREDIT

## 2021-11-10 PROCEDURE — 3331090002 HH PPS REVENUE DEBIT

## 2021-11-11 PROCEDURE — 3331090001 HH PPS REVENUE CREDIT

## 2021-11-11 PROCEDURE — 3331090002 HH PPS REVENUE DEBIT

## 2021-11-12 PROCEDURE — 3331090001 HH PPS REVENUE CREDIT

## 2021-11-12 PROCEDURE — 3331090002 HH PPS REVENUE DEBIT

## 2021-11-13 PROCEDURE — 3331090002 HH PPS REVENUE DEBIT

## 2021-11-13 PROCEDURE — 3331090001 HH PPS REVENUE CREDIT

## 2021-11-14 PROCEDURE — 3331090001 HH PPS REVENUE CREDIT

## 2021-11-14 PROCEDURE — 3331090002 HH PPS REVENUE DEBIT

## 2021-11-15 PROCEDURE — 3331090002 HH PPS REVENUE DEBIT

## 2021-11-15 PROCEDURE — 3331090001 HH PPS REVENUE CREDIT

## 2021-11-16 VITALS
HEART RATE: 106 BPM | DIASTOLIC BLOOD PRESSURE: 78 MMHG | OXYGEN SATURATION: 96 % | TEMPERATURE: 97.2 F | RESPIRATION RATE: 16 BRPM | SYSTOLIC BLOOD PRESSURE: 130 MMHG

## 2021-11-16 PROCEDURE — 3331090002 HH PPS REVENUE DEBIT

## 2021-11-16 PROCEDURE — 3331090001 HH PPS REVENUE CREDIT

## 2021-11-17 NOTE — HOME HEALTH
Caregiver involvement: COOKS, CLEANS AND TAKES PATIENT TO MD APPT. Medications reconciled and all medications are available in the home this visit. The following education was provided regarding medications, medication interactions, and look a like medications: REVIEWED MEDICATIONS WITH PATIENT. Medications  are effective at this time. Home health supplies by type and quantity ordered/delivered this visit include: NA    Patient education provided this visit: REVIEWED MEDICATIONS WITH PATIENT, INSTRUCTED PATIENT TO Fredi ROWE MD APPT. INSTRUCTED PATIENT ON WHEN TO CALL MD IF THERE ARE ANY PROBLEMS. SN DISCHARGING PATIENT THIS VISIT. MEDS IN HOME. Progress toward goals: NO FURTHER SIGNS OF DRUG TOXICITY. Home exercise program: BREATHING EXERCISES TO HELP PREVENT  RESPIRATORY PROBLEMS. The following discharge planning was discussed with the pt/caregiver: SN Donna Shah. NO FURTHER SN VISITS NEEDED, GOALS HAVE BEEN MET.

## 2022-01-12 ENCOUNTER — OFFICE VISIT (OUTPATIENT)
Dept: SURGERY | Age: 72
End: 2022-01-12
Payer: MEDICARE

## 2022-01-12 VITALS
SYSTOLIC BLOOD PRESSURE: 162 MMHG | WEIGHT: 139 LBS | HEIGHT: 69 IN | HEART RATE: 71 BPM | TEMPERATURE: 98.2 F | DIASTOLIC BLOOD PRESSURE: 93 MMHG | RESPIRATION RATE: 15 BRPM | OXYGEN SATURATION: 100 % | BODY MASS INDEX: 20.59 KG/M2

## 2022-01-12 DIAGNOSIS — Z01.818 PRE-OP TESTING: ICD-10-CM

## 2022-01-12 DIAGNOSIS — L72.3 SEBACEOUS CYST: Primary | ICD-10-CM

## 2022-01-12 PROCEDURE — G8420 CALC BMI NORM PARAMETERS: HCPCS | Performed by: SURGERY

## 2022-01-12 PROCEDURE — G8432 DEP SCR NOT DOC, RNG: HCPCS | Performed by: SURGERY

## 2022-01-12 PROCEDURE — G8427 DOCREV CUR MEDS BY ELIG CLIN: HCPCS | Performed by: SURGERY

## 2022-01-12 PROCEDURE — G8536 NO DOC ELDER MAL SCRN: HCPCS | Performed by: SURGERY

## 2022-01-12 PROCEDURE — 99205 OFFICE O/P NEW HI 60 MIN: CPT | Performed by: SURGERY

## 2022-01-12 PROCEDURE — 1101F PT FALLS ASSESS-DOCD LE1/YR: CPT | Performed by: SURGERY

## 2022-01-12 PROCEDURE — 3017F COLORECTAL CA SCREEN DOC REV: CPT | Performed by: SURGERY

## 2022-01-12 RX ORDER — SODIUM CHLORIDE 0.9 % (FLUSH) 0.9 %
5-40 SYRINGE (ML) INJECTION AS NEEDED
Status: CANCELLED | OUTPATIENT
Start: 2022-01-12

## 2022-01-12 RX ORDER — SODIUM CHLORIDE 0.9 % (FLUSH) 0.9 %
5-40 SYRINGE (ML) INJECTION EVERY 8 HOURS
Status: CANCELLED | OUTPATIENT
Start: 2022-01-12

## 2022-01-12 NOTE — PATIENT INSTRUCTIONS
Epidermoid Cyst: Care Instructions  Your Care Instructions  An epidermoid (say \"fr-lzm-EPY-prabha\") cyst is a lump just under the skin. These cysts can form when a hair follicle becomes blocked. They are common in acne and may occur on the face, neck, back, and genitals. However, they can form anywhere on the body. These cysts are not cancer and do not lead to cancer. They tend not to hurt, but they can sometimes become swollen and painful. They also may break open (rupture) and cause scarring. These cysts sometimes do not cause problems and may not need treatment. If you have a cyst that is swollen and hurts, your doctor may inject it with a medicine to help it heal. But it is more likely that a painful cyst will need to be removed. Your doctor will give you a shot of numbing medicine and cut into the cyst to drain it or remove it. This makes the symptoms go away. Follow-up care is a key part of your treatment and safety. Be sure to make and go to all appointments, and call your doctor if you are having problems. It's also a good idea to know your test results and keep a list of the medicines you take. How can you care for yourself at home? · Do not squeeze the cyst or poke it with a needle to open it. This can cause swelling, redness, and infection. · Always have a doctor look at any new lumps you get to make sure that they are not serious. When should you call for help? Watch closely for changes in your health, and be sure to contact your doctor if:    · You have a fever, redness, or swelling after you get a shot of medicine in the cyst.     · You see or feel a new lump on your skin. Where can you learn more? Go to http://www.gray.com/  Enter D050 in the search box to learn more about \"Epidermoid Cyst: Care Instructions. \"  Current as of: March 3, 2021               Content Version: 13.0  © 2006-2021 [employer], [employer].    Care instructions adapted under license by [employer] (which disclaims liability or warranty for this information). If you have questions about a medical condition or this instruction, always ask your healthcare professional. [address] any warranty or liability for your use of this information.

## 2022-01-12 NOTE — PROGRESS NOTES
Review of Systems   Constitutional: Negative. HENT: Negative. Eyes: Negative. Respiratory: Negative. Cardiovascular: Negative. Gastrointestinal: Negative. Genitourinary: Negative. Musculoskeletal: Negative. Skin: Negative. Neurological: Positive for seizures. Endo/Heme/Allergies: Negative. Psychiatric/Behavioral: Negative.

## 2022-01-12 NOTE — H&P (VIEW-ONLY)
[facility]  General Surgery    Subjective:     CC: Right chest wall soft tissue mass     HPI: Patient is a very pleasant 78-year-old male with past medical history of congenital hemiparesis of the right side, epilepsy, hyperlipidemia and poliomyelitis. He is referred by Dr. Jacki Valdez for evaluation and management of a soft tissue mass of the right chest wall. The patient states that it has been present for 3 to 4 months. It is painful and tender at times. He denies any drainage. He denies any fever or chills. Ultrasound was performed on  which reveals a revealed at that time a tube 1.5 x 1.2 cm oval hypoechoic lesion of the soft tissue of the right chest wall without surrounding inflammatory changes. Patient Active Problem List    Diagnosis Date Noted    Tegretol toxicity, accidental or unintentional, initial encounter 10/22/2021    B12 deficiency 2019    Closed displaced intertrochanteric fracture of right femur ([address] 2019    Femur fracture, right ([address] 2019    Epilepsy ([address] 2017    Congenital hemiparesis ([address] 2017     Past Medical History:   Diagnosis Date    Glaucoma     History of poliomyelitis     Hyperlipidemia     Seizures ([address]       Past Surgical History:   Procedure Laterality Date    COLONOSCOPY N/A 2019    COLONOSCOPY w bx's performed by Noni Norris MD at [facility]    HX APPENDECTOMY        Family History   Family history unknown: Yes      Social History     Tobacco Use    Smoking status: Former Smoker     Types: Cigarettes     Quit date: 2016     Years since quittin.6    Smokeless tobacco: Former User     Quit date: 2016   Substance Use Topics    Alcohol use: No      No Known Allergies    Prior to Admission medications    Medication Sig Start Date End Date Taking? Authorizing Provider   docusate sodium (COLACE) 100 mg capsule Take 100 mg by mouth two (2) times a day.    Yes Provider, Historical   multivitamins-minerals-lutein (Multivitamin 50 Plus) tab tablet Take 1 Tablet by mouth daily. Yes Provider, Historical   pravastatin (PRAVACHOL) 10 mg tablet Take 1 Tablet by mouth nightly. 10/23/21  Yes Erika Castro MD   carBAMazepine XR (TEGretol XR) 400 mg SR tablet take 1 tablet by mouth twice a day 9/22/21  Yes Migdalia Figueredo NP   acetaminophen (TYLENOL) 650 mg TbER Take 1 Tab by mouth every four (4) hours as needed for Pain or Fever. 7/23/19  Yes Judge Silverio Lee MD   bisacodyl (DULCOLAX) 10 mg suppository Insert 10 mg into rectum daily as needed for Other (if no bowel movement >48hrs). 7/23/19  Yes Judge Silverio Lee MD   cyanocobalamin 1,000 mcg tablet Take 1 Tab by mouth daily. 7/23/19  Yes Judge Silverio Lee MD   famotidine (PEPCID) 20 mg tablet Take 1 Tab by mouth two (2) times a day. 7/23/19  Yes Judge Silverio Lee MD   ferrous sulfate 325 mg (65 mg iron) tablet Take 1 Tab by mouth daily (with breakfast). 7/23/19  Yes Judge Silverio Lee MD   latanoprost (XALATAN) 0.005 % ophthalmic solution place 1 drop into both eyes at bedtime 9/24/18  Yes Jodi Pena NP       Review of Systems:    14 systems were reviewed. The results are as above in the HPI and otherwise negative. Objective:     Vitals:    01/12/22 0950 01/12/22 0952   BP: (!) 143/90 (!) 162/93   Pulse: 71    Resp: 15    Temp: 98.2 °F (36.8 °C)    TempSrc: Temporal    SpO2: 100%    Weight: 63 kg (139 lb)    Height: 5' 9\" (1.753 m)        Physical Exam:  GENERAL: alert, cooperative, no distress, appears stated age,   EYE: conjunctivae/corneas clear. PERRL, EOM's intact. THROAT & NECK: normal and no erythema or exudates noted. ,    LYMPHATIC: Cervical, supraclavicular, and axillary nodes normal. ,   LUNG: clear to auscultation bilaterally,   HEART: regular rate and rhythm, S1, S2 normal, no murmur, click, rub or gallop,   ABDOMEN: soft, non-tender.  Bowel sounds normal. No masses,  no organomegaly,   EXTREMITIES:  extremities normal, atraumatic, no cyanosis or edema,   SKIN: 3 x 2.5 cm raised firm soft tissue mass without surrounding cellulitis fluctuance or crepitance. NEUROLOGIC: AOx3. Cranial nerves 2-12 and sensation grossly intact. ,     Data Review:  to be done    Mr. Tootie Simms has a reminder for a \"due or due soon\" health maintenance. I have asked that he contact his primary care provider for follow-up on this health maintenance.   Impression:     · Patient with soft tissue mass of the right chest wall most likely a epidermal inclusion cyst.    Plan:     · Excisional biopsy soft tissue mass right chest wall (monitored anesthesia care and local anesthesia)  · Consent on chart  · Preoperative orders written    Signed By: Sánchez Ying MD     January 12, 2022

## 2022-01-12 NOTE — PROGRESS NOTES
[facility]  General Surgery    Subjective:     CC: Right chest wall soft tissue mass     HPI: Patient is a very pleasant 49-year-old male with past medical history of congenital hemiparesis of the right side, epilepsy, hyperlipidemia and poliomyelitis. He is referred by Dr. Yumiko Ramos for evaluation and management of a soft tissue mass of the right chest wall. The patient states that it has been present for 3 to 4 months. It is painful and tender at times. He denies any drainage. He denies any fever or chills. Ultrasound was performed on  which reveals a revealed at that time a tube 1.5 x 1.2 cm oval hypoechoic lesion of the soft tissue of the right chest wall without surrounding inflammatory changes. Patient Active Problem List    Diagnosis Date Noted    Tegretol toxicity, accidental or unintentional, initial encounter 10/22/2021    B12 deficiency 2019    Closed displaced intertrochanteric fracture of right femur ([address] 2019    Femur fracture, right ([address] 2019    Epilepsy ([address] 2017    Congenital hemiparesis ([address] 2017     Past Medical History:   Diagnosis Date    Glaucoma     History of poliomyelitis     Hyperlipidemia     Seizures ([address]       Past Surgical History:   Procedure Laterality Date    COLONOSCOPY N/A 2019    COLONOSCOPY w bx's performed by Ck Garcia MD at [facility]    HX APPENDECTOMY        Family History   Family history unknown: Yes      Social History     Tobacco Use    Smoking status: Former Smoker     Types: Cigarettes     Quit date: 2016     Years since quittin.6    Smokeless tobacco: Former User     Quit date: 2016   Substance Use Topics    Alcohol use: No      No Known Allergies    Prior to Admission medications    Medication Sig Start Date End Date Taking? Authorizing Provider   docusate sodium (COLACE) 100 mg capsule Take 100 mg by mouth two (2) times a day.    Yes Provider, Historical   multivitamins-minerals-lutein (Multivitamin 50 Plus) tab tablet Take 1 Tablet by mouth daily. Yes Provider, Historical   pravastatin (PRAVACHOL) 10 mg tablet Take 1 Tablet by mouth nightly. 10/23/21  Yes Silvia Sanders MD   carBAMazepine XR (TEGretol XR) 400 mg SR tablet take 1 tablet by mouth twice a day 9/22/21  Yes Zak Figueredo NP   acetaminophen (TYLENOL) 650 mg TbER Take 1 Tab by mouth every four (4) hours as needed for Pain or Fever. 7/23/19  Yes Rafal Lee MD   bisacodyl (DULCOLAX) 10 mg suppository Insert 10 mg into rectum daily as needed for Other (if no bowel movement >48hrs). 7/23/19  Yes Rafal Lee MD   cyanocobalamin 1,000 mcg tablet Take 1 Tab by mouth daily. 7/23/19  Yes Rafal Lee MD   famotidine (PEPCID) 20 mg tablet Take 1 Tab by mouth two (2) times a day. 7/23/19  Yes Rafal Lee MD   ferrous sulfate 325 mg (65 mg iron) tablet Take 1 Tab by mouth daily (with breakfast). 7/23/19  Yes Rafal Lee MD   latanoprost (XALATAN) 0.005 % ophthalmic solution place 1 drop into both eyes at bedtime 9/24/18  Yes Pb Sue NP       Review of Systems:    14 systems were reviewed. The results are as above in the HPI and otherwise negative. Objective:     Vitals:    01/12/22 0950 01/12/22 0952   BP: (!) 143/90 (!) 162/93   Pulse: 71    Resp: 15    Temp: 98.2 °F (36.8 °C)    TempSrc: Temporal    SpO2: 100%    Weight: 63 kg (139 lb)    Height: 5' 9\" (1.753 m)        Physical Exam:  GENERAL: alert, cooperative, no distress, appears stated age,   EYE: conjunctivae/corneas clear. PERRL, EOM's intact. THROAT & NECK: normal and no erythema or exudates noted. ,    LYMPHATIC: Cervical, supraclavicular, and axillary nodes normal. ,   LUNG: clear to auscultation bilaterally,   HEART: regular rate and rhythm, S1, S2 normal, no murmur, click, rub or gallop,   ABDOMEN: soft, non-tender.  Bowel sounds normal. No masses,  no organomegaly,   EXTREMITIES:  extremities normal, atraumatic, no cyanosis or edema,   SKIN: 3 x 2.5 cm raised firm soft tissue mass without surrounding cellulitis fluctuance or crepitance. NEUROLOGIC: AOx3. Cranial nerves 2-12 and sensation grossly intact. ,     Data Review:  to be done    Mr. Vanesa Nj has a reminder for a \"due or due soon\" health maintenance. I have asked that he contact his primary care provider for follow-up on this health maintenance.   Impression:     · Patient with soft tissue mass of the right chest wall most likely a epidermal inclusion cyst.    Plan:     · Excisional biopsy soft tissue mass right chest wall (monitored anesthesia care and local anesthesia)  · Consent on chart  · Preoperative orders written    Signed By: Westley Conway MD     January 12, 2022

## 2022-01-31 ENCOUNTER — HOSPITAL ENCOUNTER (OUTPATIENT)
Dept: GENERAL RADIOLOGY | Age: 72
Discharge: HOME | End: 2022-01-31
Payer: MEDICARE

## 2022-01-31 ENCOUNTER — HOSPITAL ENCOUNTER (OUTPATIENT)
Dept: PREADMISSION TESTING | Age: 72
Discharge: HOME | End: 2022-01-31
Payer: MEDICARE

## 2022-01-31 DIAGNOSIS — L72.3 SEBACEOUS CYST: ICD-10-CM

## 2022-01-31 DIAGNOSIS — Z01.818 PRE-OP TESTING: ICD-10-CM

## 2022-01-31 LAB
ANION GAP SERPL CALC-SCNC: 2 MMOL/L (ref 3–18)
BASOPHILS # BLD: 0 K/UL (ref 0–0.1)
BASOPHILS NFR BLD: 0 % (ref 0–2)
BUN SERPL-MCNC: 14 MG/DL (ref 7–18)
BUN/CREAT SERPL: 18 (ref 12–20)
CALCIUM SERPL-MCNC: 9.3 MG/DL (ref 8.5–10.1)
CHLORIDE SERPL-SCNC: 107 MMOL/L (ref 100–111)
CO2 SERPL-SCNC: 33 MMOL/L (ref 21–32)
CREAT SERPL-MCNC: 0.78 MG/DL (ref 0.6–1.3)
DIFFERENTIAL METHOD BLD: ABNORMAL
EOSINOPHIL # BLD: 0 K/UL (ref 0–0.4)
EOSINOPHIL NFR BLD: 0 % (ref 0–5)
ERYTHROCYTE [DISTWIDTH] IN BLOOD BY AUTOMATED COUNT: 12.6 % (ref 11.6–14.5)
GLUCOSE SERPL-MCNC: 79 MG/DL (ref 74–99)
HCT VFR BLD AUTO: 41.6 % (ref 36–48)
HGB BLD-MCNC: 13.4 G/DL (ref 13–16)
IMM GRANULOCYTES # BLD AUTO: 0 K/UL (ref 0–0.04)
IMM GRANULOCYTES NFR BLD AUTO: 0 % (ref 0–0.5)
LYMPHOCYTES # BLD: 4.2 K/UL (ref 0.9–3.6)
LYMPHOCYTES NFR BLD: 58 % (ref 21–52)
MCH RBC QN AUTO: 30.1 PG (ref 24–34)
MCHC RBC AUTO-ENTMCNC: 32.2 G/DL (ref 31–37)
MCV RBC AUTO: 93.5 FL (ref 78–100)
MONOCYTES # BLD: 0.4 K/UL (ref 0.05–1.2)
MONOCYTES NFR BLD: 5 % (ref 3–10)
NEUTS SEG # BLD: 2.6 K/UL (ref 1.8–8)
NEUTS SEG NFR BLD: 36 % (ref 40–73)
NRBC # BLD: 0 K/UL (ref 0–0.01)
NRBC BLD-RTO: 0 PER 100 WBC
PLATELET # BLD AUTO: 175 K/UL (ref 135–420)
PMV BLD AUTO: 10.7 FL (ref 9.2–11.8)
POTASSIUM SERPL-SCNC: 4.2 MMOL/L (ref 3.5–5.5)
RBC # BLD AUTO: 4.45 M/UL (ref 4.35–5.65)
SARS-COV-2, NAA: NOT DETECTED
SODIUM SERPL-SCNC: 142 MMOL/L (ref 136–145)
WBC # BLD AUTO: 7.2 K/UL (ref 4.6–13.2)

## 2022-01-31 PROCEDURE — 71046 X-RAY EXAM CHEST 2 VIEWS: CPT

## 2022-01-31 PROCEDURE — 85025 COMPLETE CBC W/AUTO DIFF WBC: CPT

## 2022-01-31 PROCEDURE — 36415 COLL VENOUS BLD VENIPUNCTURE: CPT

## 2022-01-31 PROCEDURE — U0003 INFECTIOUS AGENT DETECTION BY NUCLEIC ACID (DNA OR RNA); SEVERE ACUTE RESPIRATORY SYNDROME CORONAVIRUS 2 (SARS-COV-2) (CORONAVIRUS DISEASE [COVID-19]), AMPLIFIED PROBE TECHNIQUE, MAKING USE OF HIGH THROUGHPUT TECHNOLOGIES AS DESCRIBED BY CMS-2020-01-R: HCPCS

## 2022-01-31 PROCEDURE — 80048 BASIC METABOLIC PNL TOTAL CA: CPT

## 2022-02-02 NOTE — PERIOP NOTES
PRE-SURGICAL INSTRUCTIONS        Patient's Name:  Lance FAY Date:  2/2/2022            Covid Testing Date and Time:    Surgery Date:  2/4/2022                1. Do NOT eat or drink anything, including candy, gum, or ice chips after midnight on 2/4, unless you have specific instructions from your surgeon or anesthesia provider to do so.  2. You may brush your teeth before coming to the hospital.  3. No smoking 24 hours prior to the day of surgery. 4. No alcohol 24 hours prior to the day of surgery. 5. No recreational drugs for one week prior to the day of surgery. 6. Leave all valuables, including money/purse, at home. 7. Remove all jewelry, nail polish, acrylic nails, and makeup (including mascara); no lotions powders, deodorant, or perfume/cologne/after shave on the skin. 8. Follow instruction for Hibiclens washes and CHG wipes from surgeon's office. 9. Glasses/contact lenses and dentures may be worn to the hospital.  They will be removed prior to surgery. 10. Call your doctor if symptoms of a cold or illness develop within 24-48 hours prior to your surgery. 11.  If you are having an outpatient procedure, please make arrangements for a responsible ADULT TO [address] [place] [address] and stay with you for 24 hours after your surgery. 12. ONE VISITOR in the hospital at this time for outpatient procedures. Exceptions may be made for surgical admissions, per nursing unit guidelines      Special Instructions:      Bring list of CURRENT medications. .  Bring any pertinent legal medical records. Take these medications the morning of surgery with a sip of water:  Tegretol        On the day of surgery, come in the main entrance of [facility]. Let the  at the desk know you are there for surgery. A staff member will come escort you to the surgical area on the second floor.     If you have any questions or concerns, please do not hesitate to call:     (Prior to the day of surgery) [facility] department:  [phone]   (Day of surgery) Pre-Op department:  [phone]    These surgical instructions were reviewed with Tr Barboza during the [facility] phone call.

## 2022-02-03 ENCOUNTER — ANESTHESIA EVENT (OUTPATIENT)
Dept: SURGERY | Age: 72
End: 2022-02-03
Payer: MEDICARE

## 2022-02-04 ENCOUNTER — ANESTHESIA (OUTPATIENT)
Dept: SURGERY | Age: 72
End: 2022-02-04
Payer: MEDICARE

## 2022-02-04 ENCOUNTER — HOSPITAL ENCOUNTER (OUTPATIENT)
Age: 72
Discharge: HOME | End: 2022-02-04
Attending: SURGERY | Admitting: SURGERY
Payer: MEDICARE

## 2022-02-04 VITALS
RESPIRATION RATE: 18 BRPM | WEIGHT: 139.1 LBS | HEIGHT: 69 IN | OXYGEN SATURATION: 98 % | SYSTOLIC BLOOD PRESSURE: 126 MMHG | DIASTOLIC BLOOD PRESSURE: 82 MMHG | BODY MASS INDEX: 20.6 KG/M2 | TEMPERATURE: 98.3 F | HEART RATE: 91 BPM

## 2022-02-04 DIAGNOSIS — G89.18 POSTOPERATIVE PAIN: Primary | ICD-10-CM

## 2022-02-04 PROBLEM — L72.0 EPIDERMOID CYST OF SKIN OF CHEST: Status: ACTIVE | Noted: 2022-02-04

## 2022-02-04 PROCEDURE — 00300 ANES ALL PX INTEG H/N/PTRUNK: CPT | Performed by: ANESTHESIOLOGY

## 2022-02-04 PROCEDURE — 77030040361 HC SLV COMPR DVT MDII -B: Performed by: SURGERY

## 2022-02-04 PROCEDURE — 74011250636 HC RX REV CODE- 250/636: Performed by: NURSE ANESTHETIST, CERTIFIED REGISTERED

## 2022-02-04 PROCEDURE — 88305 TISSUE EXAM BY PATHOLOGIST: CPT

## 2022-02-04 PROCEDURE — 77030002933 HC SUT MCRYL J&J -A: Performed by: SURGERY

## 2022-02-04 PROCEDURE — 2709999900 HC NON-CHARGEABLE SUPPLY: Performed by: SURGERY

## 2022-02-04 PROCEDURE — 88304 TISSUE EXAM BY PATHOLOGIST: CPT

## 2022-02-04 PROCEDURE — 00300 ANES ALL PX INTEG H/N/PTRUNK: CPT | Performed by: NURSE ANESTHETIST, CERTIFIED REGISTERED

## 2022-02-04 PROCEDURE — 99100 ANES PT EXTEME AGE<1 YR&>70: CPT | Performed by: ANESTHESIOLOGY

## 2022-02-04 PROCEDURE — 74011250637 HC RX REV CODE- 250/637: Performed by: NURSE ANESTHETIST, CERTIFIED REGISTERED

## 2022-02-04 PROCEDURE — 77030031139 HC SUT VCRL2 J&J -A: Performed by: SURGERY

## 2022-02-04 PROCEDURE — 74011000250 HC RX REV CODE- 250: Performed by: SURGERY

## 2022-02-04 PROCEDURE — 74011000272 HC RX REV CODE- 272: Performed by: SURGERY

## 2022-02-04 PROCEDURE — 76010000138 HC OR TIME 0.5 TO 1 HR: Performed by: SURGERY

## 2022-02-04 PROCEDURE — 99100 ANES PT EXTEME AGE<1 YR&>70: CPT | Performed by: NURSE ANESTHETIST, CERTIFIED REGISTERED

## 2022-02-04 PROCEDURE — 77030002986 HC SUT PROL J&J -A: Performed by: SURGERY

## 2022-02-04 PROCEDURE — 74011000258 HC RX REV CODE- 258: Performed by: NURSE ANESTHETIST, CERTIFIED REGISTERED

## 2022-02-04 PROCEDURE — 76210000021 HC REC RM PH II 0.5 TO 1 HR: Performed by: SURGERY

## 2022-02-04 PROCEDURE — 12032 INTMD RPR S/A/T/EXT 2.6-7.5: CPT | Performed by: SURGERY

## 2022-02-04 PROCEDURE — 77030040922 HC BLNKT HYPOTHRM STRY -A: Performed by: SURGERY

## 2022-02-04 PROCEDURE — 11406 EXC TR-EXT B9+MARG >4.0 CM: CPT | Performed by: SURGERY

## 2022-02-04 PROCEDURE — 76210000063 HC OR PH I REC FIRST 0.5 HR: Performed by: SURGERY

## 2022-02-04 PROCEDURE — 77030002916 HC SUT ETHLN J&J -A: Performed by: SURGERY

## 2022-02-04 PROCEDURE — 74011000250 HC RX REV CODE- 250: Performed by: NURSE ANESTHETIST, CERTIFIED REGISTERED

## 2022-02-04 PROCEDURE — 76060000032 HC ANESTHESIA 0.5 TO 1 HR: Performed by: SURGERY

## 2022-02-04 RX ORDER — FAMOTIDINE 20 MG/1
20 TABLET, FILM COATED ORAL ONCE
Status: COMPLETED | OUTPATIENT
Start: 2022-02-04 | End: 2022-02-04

## 2022-02-04 RX ORDER — SODIUM CHLORIDE 0.9 % (FLUSH) 0.9 %
5-40 SYRINGE (ML) INJECTION EVERY 8 HOURS
Status: DISCONTINUED | OUTPATIENT
Start: 2022-02-04 | End: 2022-02-04 | Stop reason: HOSPADM

## 2022-02-04 RX ORDER — FENTANYL CITRATE 50 UG/ML
50 INJECTION, SOLUTION INTRAMUSCULAR; INTRAVENOUS
Status: DISCONTINUED | OUTPATIENT
Start: 2022-02-04 | End: 2022-02-04 | Stop reason: HOSPADM

## 2022-02-04 RX ORDER — ONDANSETRON 2 MG/ML
4 INJECTION INTRAMUSCULAR; INTRAVENOUS ONCE
Status: DISCONTINUED | OUTPATIENT
Start: 2022-02-04 | End: 2022-02-04 | Stop reason: HOSPADM

## 2022-02-04 RX ORDER — CEFAZOLIN SODIUM 1 G/3ML
INJECTION, POWDER, FOR SOLUTION INTRAMUSCULAR; INTRAVENOUS AS NEEDED
Status: DISCONTINUED | OUTPATIENT
Start: 2022-02-04 | End: 2022-02-04 | Stop reason: HOSPADM

## 2022-02-04 RX ORDER — IBUPROFEN 600 MG/1
600 TABLET ORAL EVERY 6 HOURS
Qty: 28 TABLET | Refills: 0 | Status: SHIPPED | OUTPATIENT
Start: 2022-02-04

## 2022-02-04 RX ORDER — MAGNESIUM SULFATE HEPTAHYDRATE 40 MG/ML
INJECTION, SOLUTION INTRAVENOUS AS NEEDED
Status: DISCONTINUED | OUTPATIENT
Start: 2022-02-04 | End: 2022-02-04 | Stop reason: HOSPADM

## 2022-02-04 RX ORDER — PROPOFOL 10 MG/ML
VIAL (ML) INTRAVENOUS
Status: DISCONTINUED | OUTPATIENT
Start: 2022-02-04 | End: 2022-02-04 | Stop reason: HOSPADM

## 2022-02-04 RX ORDER — SODIUM CHLORIDE 0.9 % (FLUSH) 0.9 %
5-40 SYRINGE (ML) INJECTION AS NEEDED
Status: DISCONTINUED | OUTPATIENT
Start: 2022-02-04 | End: 2022-02-04 | Stop reason: HOSPADM

## 2022-02-04 RX ORDER — ONDANSETRON 2 MG/ML
INJECTION INTRAMUSCULAR; INTRAVENOUS AS NEEDED
Status: DISCONTINUED | OUTPATIENT
Start: 2022-02-04 | End: 2022-02-04 | Stop reason: HOSPADM

## 2022-02-04 RX ORDER — ACETAMINOPHEN 325 MG/1
650 TABLET ORAL EVERY 6 HOURS
Qty: 56 TABLET | Refills: 1 | Status: SHIPPED | OUTPATIENT
Start: 2022-02-04

## 2022-02-04 RX ORDER — LIDOCAINE HYDROCHLORIDE 10 MG/ML
0.1 INJECTION, SOLUTION EPIDURAL; INFILTRATION; INTRACAUDAL; PERINEURAL AS NEEDED
Status: DISCONTINUED | OUTPATIENT
Start: 2022-02-04 | End: 2022-02-04 | Stop reason: HOSPADM

## 2022-02-04 RX ORDER — FENTANYL CITRATE 50 UG/ML
25 INJECTION, SOLUTION INTRAMUSCULAR; INTRAVENOUS AS NEEDED
Status: DISCONTINUED | OUTPATIENT
Start: 2022-02-04 | End: 2022-02-04 | Stop reason: HOSPADM

## 2022-02-04 RX ORDER — DEXAMETHASONE SODIUM PHOSPHATE 4 MG/ML
INJECTION, SOLUTION INTRA-ARTICULAR; INTRALESIONAL; INTRAMUSCULAR; INTRAVENOUS; SOFT TISSUE AS NEEDED
Status: DISCONTINUED | OUTPATIENT
Start: 2022-02-04 | End: 2022-02-04 | Stop reason: HOSPADM

## 2022-02-04 RX ORDER — SODIUM CHLORIDE, SODIUM LACTATE, POTASSIUM CHLORIDE, CALCIUM CHLORIDE 600; 310; 30; 20 MG/100ML; MG/100ML; MG/100ML; MG/100ML
INJECTION, SOLUTION INTRAVENOUS
Status: DISCONTINUED | OUTPATIENT
Start: 2022-02-04 | End: 2022-02-04 | Stop reason: HOSPADM

## 2022-02-04 RX ORDER — DOCUSATE SODIUM 100 MG/1
100 CAPSULE, LIQUID FILLED ORAL 2 TIMES DAILY
Qty: 60 CAPSULE | Refills: 2 | Status: SHIPPED | OUTPATIENT
Start: 2022-02-04 | End: 2022-05-05

## 2022-02-04 RX ORDER — SODIUM CHLORIDE, SODIUM LACTATE, POTASSIUM CHLORIDE, CALCIUM CHLORIDE 600; 310; 30; 20 MG/100ML; MG/100ML; MG/100ML; MG/100ML
25 INJECTION, SOLUTION INTRAVENOUS CONTINUOUS
Status: DISCONTINUED | OUTPATIENT
Start: 2022-02-04 | End: 2022-02-04 | Stop reason: HOSPADM

## 2022-02-04 RX ORDER — PROPOFOL 10 MG/ML
INJECTION, EMULSION INTRAVENOUS AS NEEDED
Status: DISCONTINUED | OUTPATIENT
Start: 2022-02-04 | End: 2022-02-04 | Stop reason: HOSPADM

## 2022-02-04 RX ORDER — FENTANYL CITRATE 50 UG/ML
INJECTION, SOLUTION INTRAMUSCULAR; INTRAVENOUS AS NEEDED
Status: DISCONTINUED | OUTPATIENT
Start: 2022-02-04 | End: 2022-02-04 | Stop reason: HOSPADM

## 2022-02-04 RX ORDER — LIDOCAINE HYDROCHLORIDE 20 MG/ML
INJECTION, SOLUTION EPIDURAL; INFILTRATION; INTRACAUDAL; PERINEURAL AS NEEDED
Status: DISCONTINUED | OUTPATIENT
Start: 2022-02-04 | End: 2022-02-04 | Stop reason: HOSPADM

## 2022-02-04 RX ORDER — OXYCODONE HYDROCHLORIDE 5 MG/1
5 TABLET ORAL
Qty: 10 TABLET | Refills: 0 | Status: SHIPPED | OUTPATIENT
Start: 2022-02-04 | End: 2022-02-07

## 2022-02-04 RX ADMIN — CEFAZOLIN SODIUM 2 G: 1 INJECTION, POWDER, FOR SOLUTION INTRAMUSCULAR; INTRAVENOUS at 10:10

## 2022-02-04 RX ADMIN — LIDOCAINE HYDROCHLORIDE 100 MG: 20 INJECTION, SOLUTION EPIDURAL; INFILTRATION; INTRACAUDAL; PERINEURAL at 09:57

## 2022-02-04 RX ADMIN — FENTANYL CITRATE 25 MCG: 50 INJECTION, SOLUTION INTRAMUSCULAR; INTRAVENOUS at 10:12

## 2022-02-04 RX ADMIN — DEXMEDETOMIDINE HYDROCHLORIDE 10 MCG: 100 INJECTION, SOLUTION, CONCENTRATE INTRAVENOUS at 10:18

## 2022-02-04 RX ADMIN — FENTANYL CITRATE 50 MCG: 50 INJECTION, SOLUTION INTRAMUSCULAR; INTRAVENOUS at 10:23

## 2022-02-04 RX ADMIN — MAGNESIUM SULFATE 2 G: 2 INJECTION INTRAVENOUS at 09:54

## 2022-02-04 RX ADMIN — PROPOFOL 100 MCG/KG/MIN: 10 INJECTION, EMULSION INTRAVENOUS at 09:57

## 2022-02-04 RX ADMIN — FENTANYL CITRATE 25 MCG: 50 INJECTION, SOLUTION INTRAMUSCULAR; INTRAVENOUS at 09:57

## 2022-02-04 RX ADMIN — ONDANSETRON 4 MG: 2 INJECTION INTRAMUSCULAR; INTRAVENOUS at 10:28

## 2022-02-04 RX ADMIN — PROPOFOL 30 MG: 10 INJECTION, EMULSION INTRAVENOUS at 09:57

## 2022-02-04 RX ADMIN — SODIUM CHLORIDE, SODIUM LACTATE, POTASSIUM CHLORIDE, AND CALCIUM CHLORIDE: 600; 310; 30; 20 INJECTION, SOLUTION INTRAVENOUS at 09:48

## 2022-02-04 RX ADMIN — DEXAMETHASONE SODIUM PHOSPHATE 4 MG: 4 INJECTION, SOLUTION INTRAMUSCULAR; INTRAVENOUS at 10:03

## 2022-02-04 RX ADMIN — FAMOTIDINE 20 MG: 20 TABLET, FILM COATED ORAL at 09:32

## 2022-02-04 RX ADMIN — DEXMEDETOMIDINE HYDROCHLORIDE 10 MCG: 100 INJECTION, SOLUTION, CONCENTRATE INTRAVENOUS at 09:49

## 2022-02-04 RX ADMIN — SODIUM CHLORIDE, SODIUM LACTATE, POTASSIUM CHLORIDE, AND CALCIUM CHLORIDE 25 ML/HR: 600; 310; 30; 20 INJECTION, SOLUTION INTRAVENOUS at 09:31

## 2022-02-04 RX ADMIN — DEXMEDETOMIDINE HYDROCHLORIDE 10 MCG: 100 INJECTION, SOLUTION, CONCENTRATE INTRAVENOUS at 09:58

## 2022-02-04 NOTE — ANESTHESIA PREPROCEDURE EVALUATION
Relevant Problems   NEUROLOGY   (+) Epilepsy (HCC)       Anesthetic History   No history of anesthetic complications            Review of Systems / Medical History  Patient summary reviewed and pertinent labs reviewed    Pulmonary  Within defined limits                 Neuro/Psych     seizures: well controlled         Cardiovascular  Within defined limits                     GI/Hepatic/Renal  Within defined limits              Endo/Other  Within defined limits           Other Findings              Physical Exam    Airway  Mallampati: II  TM Distance: 4 - 6 cm  Neck ROM: normal range of motion        Cardiovascular  Regular rate and rhythm,  S1 and S2 normal,  no murmur, click, rub, or gallop             Dental    Dentition: Edentulous     Pulmonary  Breath sounds clear to auscultation               Abdominal  GI exam deferred       Other Findings            Anesthetic Plan    ASA: 3  Anesthesia type: MAC          Induction: Intravenous  Anesthetic plan and risks discussed with: Patient

## 2022-02-04 NOTE — INTERVAL H&P NOTE
Update History & Physical    The Patient's History and Physical of January 12, 2022 was reviewed with the patient and I examined the patient. There was no change. The surgical site was confirmed by the patient and me. Plan:  The risk, benefits, expected outcome, and alternative to the recommended procedure have been discussed with the patient. Patient understands and wants to proceed with the procedure.     Electronically signed by Isa Mendosa MD on 2/4/2022 at 9:13 AM

## 2022-02-04 NOTE — ANESTHESIA POSTPROCEDURE EVALUATION
Procedure(s):  EXCISIONAL BIOPSY SOFT TISSUE MASS RIGHT CHEST WALL  *MAC/LOCAL*. No value filed. Anesthesia Post Evaluation      Multimodal analgesia: multimodal analgesia used between 6 hours prior to anesthesia start to PACU discharge  Patient location during evaluation: PACU  Patient participation: complete - patient participated  Level of consciousness: awake and alert  Pain management: adequate  Airway patency: patent  Anesthetic complications: no  Cardiovascular status: acceptable  Respiratory status: acceptable  Hydration status: acceptable  Post anesthesia nausea and vomiting:  controlled  Final Post Anesthesia Temperature Assessment:  Normothermia (36.0-37.5 degrees C)      INITIAL Post-op Vital signs:   Vitals Value Taken Time   /65 02/04/22 1059   Temp 36.9 °C (98.4 °F) 02/04/22 1040   Pulse 69 02/04/22 1107   Resp 12 02/04/22 1107   SpO2 100 % 02/04/22 1107   Vitals shown include unvalidated device data.

## 2022-02-04 NOTE — OP NOTES
[facility], [address] [place] [address]                                 OPERATIVE REPORT    PATIENT:    Angelina Munoz  MRN:           [id number]   DATE:   2022  BILLIN  ROOM:       OR/PL  ATTENDING:   Loida Steele MD  DICTATING:   Loida Steele MD      Preoperative Dx: Soft tissue mass right chest wall    Postoperative Dx: Same     Procedure: Excisional biopsy of soft tissue mass right chest wall     Surgeon:  Yarelis Gibson MD    Assistant: Dm Adrian SA    Anesthesia:  General and Local -1% lidocaine with epinephrine and one-to-one solution with 0.25% Marcaine plain    Findings:   Sebaceous cyst    Specimen: Right chest wall mass    EBL: 10 mL    Fluid: 410 mL    Complications:  None    Implants: None    Brief Operative Indication:   Right chest wall mass    Description of operation :  The patient was identified in the preoperative area. Informed consent was obtained from the patient. The patient was positioned supine on the table. The skin was prepped with Chlorhexadine and sterile drape applied. Time out was performed. Briefing was performed. The local anesthetic was infiltrated into the skin and subcutaneous tissues. The #10 blade was used to create an incision 6 cm L  X 2 cm W to excise the 2 cm L X 2 cm W mass. The electrocautery used to completely excise the cystic lesion from the surrounding subcutaneous tissue. The deepest layer of dissection was the subcutaneous tissue. The wound was cleaned with sterile saline. The deep dermal tissues were re approximated using 3-0 Vicryl suture with interrupted simple stitches. The skin was re approximated using 4-0 Vicryl suture in a running subcuticular closure. Mastisol, steristrips, gauze and tape were used to create a dressing. He tolerated the procedure well.        Disposition: He was stable transport to the recovery.

## 2022-02-04 NOTE — DISCHARGE SUMMARY
Jorge Dougherty MD, [facility]  General Surgery  Discharge Summary     Patient ID:  Glenna Scott  [id number]  11 y.o.  1950    Admit Date: 2/4/2022    Discharge Date: 2/4/2022    Admission Diagnoses: Sebaceous cyst [L72.3]; Epidermoid cyst of skin of chest [L72.0]    Discharge Diagnoses:    Problem List as of 2/4/2022 Date Reviewed: 1/12/2022          Codes Class Noted - Resolved    Epidermoid cyst of skin of chest ICD-10-CM: L72.0  ICD-9-CM: 706.2  2/4/2022 - Present        Tegretol toxicity, accidental or unintentional, initial encounter ICD-10-CM: T42.1X1A  ICD-9-CM: 966.3, E855.0  10/22/2021 - Present        B12 deficiency ICD-10-CM: E53.8  ICD-9-CM: 266.2  7/24/2019 - Present        Closed displaced intertrochanteric fracture of right femur ([facility] ICD-10-CM: S75.409W  ICD-9-CM: 820.21  7/21/2019 - Present        Femur fracture, right ([facility] ICD-10-CM: S72.91XA  ICD-9-CM: 821.00  7/20/2019 - Present        Epilepsy ([facility] ICD-10-CM: G40.909  ICD-9-CM: 345.90  7/12/2017 - Present        Congenital hemiparesis ([facility] ICD-10-CM: G80.8  ICD-9-CM: 343.1  7/12/2017 - Present        RESOLVED: Hx of post-polio syndrome ICD-10-CM: Z86.12  ICD-9-CM: V12.02  10/2/2014 - 7/12/2017               Admission Condition: Good    Discharge Condition: Good    Last Procedure: Procedure(s):  EXCISIONAL BIOPSY SOFT TISSUE MASS RIGHT CHEST WALL  *[facility] [facility] Course:   Normal hospital course for this procedure. Consults: None    Significant Diagnostic Studies: None    Disposition: home    Patient Instructions:   Current Discharge Medication List      START taking these medications    Details   acetaminophen (TYLENOL) 325 mg tablet Take 2 Tablets by mouth every six (6) hours. Indications: pain  Qty: 56 Tablet, Refills: 1      ibuprofen (MOTRIN) 600 mg tablet Take 1 Tablet by mouth every six (6) hours.   Qty: 28 Tablet, Refills: 0      oxyCODONE IR (ROXICODONE) 5 mg immediate release tablet Take 1 Tablet by mouth every four (4) hours as needed for Pain for up to 3 days. Max Daily Amount: 30 mg.  Qty: 10 Tablet, Refills: 0    Associated Diagnoses: Postoperative pain      bisacodyL 5 mg tab Take 5 mg by mouth daily as needed for PRN Reason (Other) (Constipation). Qty: 3 Tablet, Refills: 0         CONTINUE these medications which have CHANGED    Details   docusate sodium (COLACE) 100 mg capsule Take 1 Capsule by mouth two (2) times a day for 90 days. Qty: 60 Capsule, Refills: 2         CONTINUE these medications which have NOT CHANGED    Details   multivitamins-minerals-lutein (Multivitamin 50 Plus) tab tablet Take 1 Tablet by mouth daily. pravastatin (PRAVACHOL) 10 mg tablet Take 1 Tablet by mouth nightly. Qty: 30 Tablet, Refills: 0      carBAMazepine XR (TEGretol XR) 400 mg SR tablet take 1 tablet by mouth twice a day  Qty: 60 Tablet, Refills: 5    Associated Diagnoses: Convulsions, unspecified convulsion type (HCC)      cyanocobalamin 1,000 mcg tablet Take 1 Tab by mouth daily. Qty: 30 Tab, Refills: 0      famotidine (PEPCID) 20 mg tablet Take 1 Tab by mouth two (2) times a day. Qty: 60 Tab, Refills: 0      ferrous sulfate 325 mg (65 mg iron) tablet Take 1 Tab by mouth daily (with breakfast). Qty: 30 Tab, Refills: 0      latanoprost (XALATAN) 0.005 % ophthalmic solution place 1 drop into both eyes at bedtime  Qty: 2.5 mL, Refills: 1    Associated Diagnoses: Open-angle glaucoma, unspecified glaucoma stage, unspecified laterality, unspecified open-angle glaucoma type         STOP taking these medications       acetaminophen (TYLENOL) 650 mg TbER Comments:   Reason for Stopping:         bisacodyl (DULCOLAX) 10 mg suppository Comments:   Reason for Stopping:             Activity: See surgical instructions  Diet: Low fat, Low cholesterol  Wound Care: As directed    Follow-up with Dr. Magdalene Leal in 2 weeks.   Follow-up tests/labs None    Signed:  Kassie Crouch MD  2/4/2022  10:25 AM

## 2022-02-04 NOTE — DISCHARGE INSTRUCTIONS
[place] [address] Specialists  Enzo Lehman MD, [facility]  General Surgery    Pt may remove the dressing and shower in two days. Please use your wash clothe to wash the incision with soap and water once the dressings have been removed  No driving or operating heavy machinery while on narcotic pain medications. Please apply an ice pack to the operative site for 30 minutes 3 times daily to help reduce pain and swelling and the need for narcotic pain medication. No strenuous activity or contact sports for two weeks. No lifting greater than 15 lbs for 2 weeks. Call MD for any redness, swelling, bleeding or pus at the incision. Also call for any nausea, vomiting, increased pain or pain uncontrolled by pain medicine. Patient Education        Epidermoid Cyst: Care Instructions  Your Care Instructions  An epidermoid (say \"vb-cyy-LRM-prabha\") cyst is a lump just under the skin. These cysts can form when a hair follicle becomes blocked. They are common in acne and may occur on the face, neck, back, and genitals. However, they can form anywhere on the body. These cysts are not cancer and do not lead to cancer. They tend not to hurt, but they can sometimes become swollen and painful. They also may break open (rupture) and cause scarring. These cysts sometimes do not cause problems and may not need treatment. If you have a cyst that is swollen and hurts, your doctor may inject it with a medicine to help it heal. But it is more likely that a painful cyst will need to be removed. Your doctor will give you a shot of numbing medicine and cut into the cyst to drain it or remove it. This makes the symptoms go away. Follow-up care is a key part of your treatment and safety. Be sure to make and go to all appointments, and call your doctor if you are having problems. It's also a good idea to know your test results and keep a list of the medicines you take. How can you care for yourself at home?   · Do not squeeze the cyst or poke it with a needle to open it. This can cause swelling, redness, and infection. · Always have a doctor look at any new lumps you get to make sure that they are not serious. When should you call for help? Watch closely for changes in your health, and be sure to contact your doctor if:    · You have a fever, redness, or swelling after you get a shot of medicine in the cyst.     · You see or feel a new lump on your skin. Where can you learn more? Go to http://www.gray.com/  Enter A615 in the search box to learn more about \"Epidermoid Cyst: Care Instructions. \"  Current as of: March 3, 2021               Content Version: 13.0  © 2006-2021 Healthwise, [employer]. Care instructions adapted under license by [employer] (which disclaims liability or warranty for this information). If you have questions about a medical condition or this instruction, always ask your healthcare professional. [place] [address] any warranty or liability for your use of this information.

## 2022-02-22 ENCOUNTER — OFFICE VISIT (OUTPATIENT)
Dept: SURGERY | Age: 72
End: 2022-02-22

## 2022-02-22 VITALS
HEIGHT: 69 IN | DIASTOLIC BLOOD PRESSURE: 73 MMHG | HEART RATE: 81 BPM | TEMPERATURE: 97.4 F | WEIGHT: 138 LBS | SYSTOLIC BLOOD PRESSURE: 118 MMHG | RESPIRATION RATE: 16 BRPM | BODY MASS INDEX: 20.44 KG/M2 | OXYGEN SATURATION: 98 %

## 2022-02-22 DIAGNOSIS — Z09 POSTOPERATIVE EXAMINATION: Primary | ICD-10-CM

## 2022-02-22 PROCEDURE — 99024 POSTOP FOLLOW-UP VISIT: CPT | Performed by: SURGERY

## 2022-02-22 NOTE — PROGRESS NOTES
[address] [place] [address] Surgical Specialists  General Surgery    Name: Natasha Ahumada MRN: [id number]   : 1950 [facility]   Date: 2022 Admission Date: No admission date for patient encounter. Subjective:  Patient without complaints. Objective:  Vitals:    22 1319   BP: 118/73   Pulse: 81   Resp: 16   Temp: 97.4 °F (36.3 °C)   TempSrc: Temporal   SpO2: 98%   Weight: 62.6 kg (138 lb)   Height: 5' 9\" (1.753 m)       Physical Exam:    General: Awake and alert, oriented x4, no apparent distress   Chest: Right chest wall incision healing well. Current Medications:  Current Outpatient Medications   Medication Sig Dispense Refill    acetaminophen (TYLENOL) 325 mg tablet Take 2 Tablets by mouth every six (6) hours. Indications: pain 56 Tablet 1    ibuprofen (MOTRIN) 600 mg tablet Take 1 Tablet by mouth every six (6) hours. 28 Tablet 0    docusate sodium (COLACE) 100 mg capsule Take 1 Capsule by mouth two (2) times a day for 90 days. 60 Capsule 2    multivitamins-minerals-lutein (Multivitamin 50 Plus) tab tablet Take 1 Tablet by mouth daily.  pravastatin (PRAVACHOL) 10 mg tablet Take 1 Tablet by mouth nightly. 30 Tablet 0    carBAMazepine XR (TEGretol XR) 400 mg SR tablet take 1 tablet by mouth twice a day 60 Tablet 5    cyanocobalamin 1,000 mcg tablet Take 1 Tab by mouth daily. 30 Tab 0    famotidine (PEPCID) 20 mg tablet Take 1 Tab by mouth two (2) times a day. 60 Tab 0    ferrous sulfate 325 mg (65 mg iron) tablet Take 1 Tab by mouth daily (with breakfast). 30 Tab 0    latanoprost (XALATAN) 0.005 % ophthalmic solution place 1 drop into both eyes at bedtime 2.5 mL 1    bisacodyL 5 mg tab Take 5 mg by mouth daily as needed for PRN Reason (Other) (Constipation). (Patient not taking: Reported on 2022) 3 Tablet 0       Chart and notes reviewed. Data reviewed. I have evaluated and examined the patient.         IMPRESSION:   · Patient doing well following removal of epidermal inclusion cyst right chest wall.       PLAN:/DISCUSION:   · Follow-up as needed        Silvia Ward MD

## 2022-03-14 ENCOUNTER — TELEPHONE (OUTPATIENT)
Dept: NEUROLOGY | Age: 72
End: 2022-03-14

## 2022-03-19 PROBLEM — S72.141A CLOSED DISPLACED INTERTROCHANTERIC FRACTURE OF RIGHT FEMUR (HCC): Status: ACTIVE | Noted: 2019-07-21

## 2022-03-19 PROBLEM — S72.91XA FEMUR FRACTURE, RIGHT (HCC): Status: ACTIVE | Noted: 2019-07-20

## 2022-03-19 PROBLEM — T42.1X1A: Status: ACTIVE | Noted: 2021-10-22

## 2022-03-19 PROBLEM — G80.8: Status: ACTIVE | Noted: 2017-07-12

## 2022-03-19 PROBLEM — L72.0 EPIDERMOID CYST OF SKIN OF CHEST: Status: ACTIVE | Noted: 2022-02-04

## 2022-03-19 PROBLEM — G40.909 EPILEPSY (HCC): Status: ACTIVE | Noted: 2017-07-12

## 2022-03-19 PROBLEM — E53.8 B12 DEFICIENCY: Status: ACTIVE | Noted: 2019-07-24

## 2022-04-14 ENCOUNTER — TELEPHONE (OUTPATIENT)
Dept: NEUROLOGY | Age: 72
End: 2022-04-14

## 2022-04-18 DIAGNOSIS — R56.9 CONVULSIONS, UNSPECIFIED CONVULSION TYPE (HCC): ICD-10-CM

## 2022-04-18 RX ORDER — CARBAMAZEPINE 400 MG/1
TABLET, EXTENDED RELEASE ORAL
Qty: 60 TABLET | Refills: 0 | Status: SHIPPED | OUTPATIENT
Start: 2022-04-18 | End: 2022-05-02 | Stop reason: SDUPTHER

## 2022-04-19 ENCOUNTER — TELEPHONE (OUTPATIENT)
Dept: NEUROLOGY | Age: 72
End: 2022-04-19

## 2022-04-19 NOTE — TELEPHONE ENCOUNTER
Spoke with patient's caregiver, and Rx she says received may have come from the primary's doctor's office and picked up prior to yesterday 4/18/22. Caregiver to call pharmacy for Rx Carbamezapine (Tegretol) for the Quantity of 60 tablets sent by NPBea.

## 2022-04-19 NOTE — TELEPHONE ENCOUNTER
Pt called to get more TEGRETAL. Pt has an appt scheduled for 5/10 and only 10 pills was sent to pharmacy.  Pt need additional pills to carry him to his 5/10 appt

## 2022-05-02 ENCOUNTER — VIRTUAL VISIT (OUTPATIENT)
Dept: NEUROLOGY | Age: 72
End: 2022-05-02
Payer: MEDICARE

## 2022-05-02 DIAGNOSIS — R56.9 CONVULSIONS, UNSPECIFIED CONVULSION TYPE (HCC): ICD-10-CM

## 2022-05-02 DIAGNOSIS — Z51.81 MEDICATION MONITORING ENCOUNTER: Primary | ICD-10-CM

## 2022-05-02 PROCEDURE — 99214 OFFICE O/P EST MOD 30 MIN: CPT | Performed by: NURSE PRACTITIONER

## 2022-05-02 PROCEDURE — G8536 NO DOC ELDER MAL SCRN: HCPCS | Performed by: NURSE PRACTITIONER

## 2022-05-02 PROCEDURE — G8420 CALC BMI NORM PARAMETERS: HCPCS | Performed by: NURSE PRACTITIONER

## 2022-05-02 PROCEDURE — G8427 DOCREV CUR MEDS BY ELIG CLIN: HCPCS | Performed by: NURSE PRACTITIONER

## 2022-05-02 PROCEDURE — G8432 DEP SCR NOT DOC, RNG: HCPCS | Performed by: NURSE PRACTITIONER

## 2022-05-02 PROCEDURE — 1101F PT FALLS ASSESS-DOCD LE1/YR: CPT | Performed by: NURSE PRACTITIONER

## 2022-05-02 PROCEDURE — 3017F COLORECTAL CA SCREEN DOC REV: CPT | Performed by: NURSE PRACTITIONER

## 2022-05-02 PROCEDURE — G0463 HOSPITAL OUTPT CLINIC VISIT: HCPCS | Performed by: NURSE PRACTITIONER

## 2022-05-02 RX ORDER — CARBAMAZEPINE 400 MG/1
TABLET, EXTENDED RELEASE ORAL
Qty: 180 TABLET | Refills: 1 | Status: SHIPPED | OUTPATIENT
Start: 2022-05-02 | End: 2022-10-14 | Stop reason: SDUPTHER

## 2022-05-02 NOTE — PROGRESS NOTES
Constantino Sanchez is a 66 y.o. male who will be using a cell phone with the assistance from his Niece. 1. Have you been to the ER, urgent care clinic since your last visit? Hospitalized since your last visit? No    2. Have you seen or consulted any other health care providers outside of the [address] [place] [address] since your last visit? Include any pap smears or colon screening.  No     This will be the cell phone number [phone]

## 2022-05-02 NOTE — PROGRESS NOTES
Carolina Lucas is a 66 y.o. male who was seen by synchronous (real-time) audio-video technology on 5/2/2022 for Follow-up (Convulsions)        Assessment & Plan:   Diagnoses and all orders for this visit:    1. Medication monitoring encounter  -     CARBAMAZEPINE; Future  -     METABOLIC PANEL, COMPREHENSIVE; Future  -     CBC WITH AUTOMATED DIFF; Future    2. Convulsions, unspecified convulsion type (HCC)  -     carBAMazepine XR (TEGretol XR) 400 mg SR tablet; take 1 tablet by mouth twice a day        Follow up epilepsy with one reported breakthrough. Requesting refills of Tegretol 400 mg twice daily. No recent levels. Last reviewed from 10/21 in goal, but history of elevated levels. Serum labs ordered. Continue to monitor symptoms and follow up in 6 months or sooner as need be. All questions addressed both patient/neice are agreeable with plan of care. I spent at least 30 minutes on this visit with this established patient. 712  Subjective: This is a 66year old male who presents for follow up epilepsy. Accompanied by his niece. He tells me he had once seizure in the interim. Overall feeling well today. Denies missed doses. Requesting refills of Tegretol 400 mg. No recent labs done. No other concerns at this time. Prior to Admission medications    Medication Sig Start Date End Date Taking? Authorizing Provider   carBAMazepine XR (TEGretol XR) 400 mg SR tablet take 1 tablet by mouth twice a day 4/18/22  Yes Arlen Figueredo, NP   acetaminophen (TYLENOL) 325 mg tablet Take 2 Tablets by mouth every six (6) hours. Indications: pain 2/4/22  Yes Lauren Dotson MD   ibuprofen (MOTRIN) 600 mg tablet Take 1 Tablet by mouth every six (6) hours. 2/4/22  Yes Lauren Dotson MD   docusate sodium (COLACE) 100 mg capsule Take 1 Capsule by mouth two (2) times a day for 90 days.  2/4/22 5/5/22 Yes Lauren Dotson MD   multivitamins-minerals-lutein (Multivitamin 50 Plus) tab tablet Take 1 Tablet by mouth daily. Yes Provider, Historical   pravastatin (PRAVACHOL) 10 mg tablet Take 1 Tablet by mouth nightly. 10/23/21  Yes Leslee Blackwood MD   cyanocobalamin 1,000 mcg tablet Take 1 Tab by mouth daily. 7/23/19  Yes Dorcas Lee MD   famotidine (PEPCID) 20 mg tablet Take 1 Tab by mouth two (2) times a day. 7/23/19  Yes Dorcas Lee MD   ferrous sulfate 325 mg (65 mg iron) tablet Take 1 Tab by mouth daily (with breakfast). 7/23/19  Yes Dorcas Lee MD   latanoprost (XALATAN) 0.005 % ophthalmic solution place 1 drop into both eyes at bedtime 9/24/18  Yes Madiha Qureshi NP   bisacodyL 5 mg tab Take 5 mg by mouth daily as needed for PRN Reason (Other) (Constipation). Patient not taking: Reported on 2/22/2022 2/4/22   Messi Toth MD     Patient Active Problem List   Diagnosis Code    Epilepsy [facility]) G40.909    Congenital hemiparesis ([address] G80.8    Femur fracture, right ([address] S72.91XA    Closed displaced intertrochanteric fracture of right femur ([address] S72.141A    B12 deficiency E53.8    Tegretol toxicity, accidental or unintentional, initial encounter T42.1X1A    Epidermoid cyst of skin of chest L72.0     Patient Active Problem List    Diagnosis Date Noted    Epidermoid cyst of skin of chest 02/04/2022    Tegretol toxicity, accidental or unintentional, initial encounter 10/22/2021    B12 deficiency 07/24/2019    Closed displaced intertrochanteric fracture of right femur ([address] 07/21/2019    Femur fracture, right ([address] 07/20/2019    Epilepsy ([address] 07/12/2017    Congenital hemiparesis ([address] 07/12/2017     Current Outpatient Medications   Medication Sig Dispense Refill    carBAMazepine XR (TEGretol XR) 400 mg SR tablet take 1 tablet by mouth twice a day 180 Tablet 1    acetaminophen (TYLENOL) 325 mg tablet Take 2 Tablets by mouth every six (6) hours.  Indications: pain 56 Tablet 1    ibuprofen (MOTRIN) 600 mg tablet Take 1 Tablet by mouth every six (6) hours. 28 Tablet 0    docusate sodium (COLACE) 100 mg capsule Take 1 Capsule by mouth two (2) times a day for 90 days. 60 Capsule 2    multivitamins-minerals-lutein (Multivitamin 50 Plus) tab tablet Take 1 Tablet by mouth daily.  pravastatin (PRAVACHOL) 10 mg tablet Take 1 Tablet by mouth nightly. 30 Tablet 0    cyanocobalamin 1,000 mcg tablet Take 1 Tab by mouth daily. 30 Tab 0    famotidine (PEPCID) 20 mg tablet Take 1 Tab by mouth two (2) times a day. 60 Tab 0    ferrous sulfate 325 mg (65 mg iron) tablet Take 1 Tab by mouth daily (with breakfast). 30 Tab 0    latanoprost (XALATAN) 0.005 % ophthalmic solution place 1 drop into both eyes at bedtime 2.5 mL 1    bisacodyL 5 mg tab Take 5 mg by mouth daily as needed for PRN Reason (Other) (Constipation).  (Patient not taking: Reported on 2022) 3 Tablet 0     No Known Allergies  Past Medical History:   Diagnosis Date    Glaucoma     History of poliomyelitis     Hyperlipidemia     Seizures (HCC)      Past Surgical History:   Procedure Laterality Date    BIOPSY  2022    Excisional biopsy chest wall    COLONOSCOPY N/A 2019    COLONOSCOPY w bx's performed by Jewel Kraft MD at [facility]    HX APPENDECTOMY       Family History   Family history unknown: Yes     Social History     Tobacco Use    Smoking status: Former Smoker     Types: Cigarettes     Quit date: 2016     Years since quittin.9    Smokeless tobacco: Former User     Quit date: 2016   Substance Use Topics    Alcohol use: No       Review of Systems  GENERAL: Denies fever or fatigue  CARDIAC: No CP or SOB  PULMONARY: No cough of SOB  MUSCULOSKELETAL: No new joint pain  NEURO: SEE HPI      Objective:     Patient-Reported Vitals 2022   Patient-Reported Weight 135 pounds   Patient-Reported Height 5. 10      General: alert, cooperative, no distress   Mental  status: normal mood, behavior, speech, dress, and thought processes, able to follow commands   HENT: NCAT   Neck: no visualized mass   Resp: no respiratory distress   Neuro: no gross deficits   Skin: no discoloration or lesions of concern on visible areas   Psychiatric: normal affect, consistent with stated mood, no evidence of hallucinations     Additional exam findings: This is a very pleasant 65year old male. Aler and in NAD. No facial asymmetry. Right hemiparesis. We discussed the expected course, resolution and complications of the diagnosis(es) in detail. Medication risks, benefits, costs, interactions, and alternatives were discussed as indicated. I advised him to contact the office if his condition worsens, changes or fails to improve as anticipated. He expressed understanding with the diagnosis(es) and plan. Zbigniew Barrera, was evaluated through a synchronous (real-time) audio-video encounter. The patient (or guardian if applicable) is aware that this is a billable service, which includes applicable co-pays. Verbal consent to proceed has been obtained. The visit was conducted pursuant to the emergency declaration under the [address] [place] [address] authority and the [employer] General Act. Patient identification was verified, and a caregiver was present when appropriate. The patient was located at home in a state where the provider was licensed to provide care.     Elizabeth Valenzuela NP

## 2022-05-10 ENCOUNTER — HOSPITAL ENCOUNTER (OUTPATIENT)
Dept: LAB | Age: 72
Discharge: HOME | End: 2022-05-10
Payer: MEDICARE

## 2022-05-10 DIAGNOSIS — Z51.81 MEDICATION MONITORING ENCOUNTER: ICD-10-CM

## 2022-05-10 LAB
ALBUMIN SERPL-MCNC: 4.2 G/DL (ref 3.4–5)
ALBUMIN/GLOB SERPL: 1.6 {RATIO} (ref 0.8–1.7)
ALP SERPL-CCNC: 80 U/L (ref 45–117)
ALT SERPL-CCNC: 21 U/L (ref 16–61)
ANION GAP SERPL CALC-SCNC: 2 MMOL/L (ref 3–18)
AST SERPL-CCNC: 13 U/L (ref 10–38)
BASOPHILS # BLD: 0 K/UL (ref 0–0.1)
BASOPHILS NFR BLD: 1 % (ref 0–2)
BILIRUB SERPL-MCNC: 0.4 MG/DL (ref 0.2–1)
BUN SERPL-MCNC: 10 MG/DL (ref 7–18)
BUN/CREAT SERPL: 11 (ref 12–20)
CALCIUM SERPL-MCNC: 9.2 MG/DL (ref 8.5–10.1)
CARBAMAZEPINE SERPL-MCNC: 10.6 UG/ML (ref 4–12)
CHLORIDE SERPL-SCNC: 107 MMOL/L (ref 100–111)
CO2 SERPL-SCNC: 33 MMOL/L (ref 21–32)
CREAT SERPL-MCNC: 0.87 MG/DL (ref 0.6–1.3)
DIFFERENTIAL METHOD BLD: NORMAL
EOSINOPHIL # BLD: 0 K/UL (ref 0–0.4)
EOSINOPHIL NFR BLD: 1 % (ref 0–5)
ERYTHROCYTE [DISTWIDTH] IN BLOOD BY AUTOMATED COUNT: 11.9 % (ref 11.6–14.5)
GLOBULIN SER CALC-MCNC: 2.7 G/DL (ref 2–4)
GLUCOSE SERPL-MCNC: 96 MG/DL (ref 74–99)
HCT VFR BLD AUTO: 41.3 % (ref 36–48)
HGB BLD-MCNC: 13.9 G/DL (ref 13–16)
IMM GRANULOCYTES # BLD AUTO: 0 K/UL (ref 0–0.04)
IMM GRANULOCYTES NFR BLD AUTO: 0 % (ref 0–0.5)
LYMPHOCYTES # BLD: 3.1 K/UL (ref 0.9–3.6)
LYMPHOCYTES NFR BLD: 49 % (ref 21–52)
MCH RBC QN AUTO: 31.3 PG (ref 24–34)
MCHC RBC AUTO-ENTMCNC: 33.7 G/DL (ref 31–37)
MCV RBC AUTO: 93 FL (ref 78–100)
MONOCYTES # BLD: 0.3 K/UL (ref 0.05–1.2)
MONOCYTES NFR BLD: 4 % (ref 3–10)
NEUTS SEG # BLD: 2.9 K/UL (ref 1.8–8)
NEUTS SEG NFR BLD: 46 % (ref 40–73)
NRBC # BLD: 0 K/UL (ref 0–0.01)
NRBC BLD-RTO: 0 PER 100 WBC
PLATELET # BLD AUTO: 172 K/UL (ref 135–420)
PMV BLD AUTO: 10.1 FL (ref 9.2–11.8)
POTASSIUM SERPL-SCNC: 3.9 MMOL/L (ref 3.5–5.5)
PROT SERPL-MCNC: 6.9 G/DL (ref 6.4–8.2)
RBC # BLD AUTO: 4.44 M/UL (ref 4.35–5.65)
SODIUM SERPL-SCNC: 142 MMOL/L (ref 136–145)
WBC # BLD AUTO: 6.4 K/UL (ref 4.6–13.2)

## 2022-05-10 PROCEDURE — 36415 COLL VENOUS BLD VENIPUNCTURE: CPT

## 2022-05-10 PROCEDURE — 80156 ASSAY CARBAMAZEPINE TOTAL: CPT

## 2022-05-10 PROCEDURE — 85025 COMPLETE CBC W/AUTO DIFF WBC: CPT

## 2022-05-10 PROCEDURE — 80053 COMPREHEN METABOLIC PANEL: CPT

## 2022-10-14 ENCOUNTER — OFFICE VISIT (OUTPATIENT)
Dept: NEUROLOGY | Age: 72
End: 2022-10-14
Payer: MEDICARE

## 2022-10-14 VITALS
HEIGHT: 69 IN | OXYGEN SATURATION: 98 % | DIASTOLIC BLOOD PRESSURE: 78 MMHG | RESPIRATION RATE: 20 BRPM | WEIGHT: 131.6 LBS | SYSTOLIC BLOOD PRESSURE: 136 MMHG | BODY MASS INDEX: 19.49 KG/M2 | HEART RATE: 80 BPM

## 2022-10-14 DIAGNOSIS — R56.9 CONVULSIONS, UNSPECIFIED CONVULSION TYPE (HCC): ICD-10-CM

## 2022-10-14 PROCEDURE — 1101F PT FALLS ASSESS-DOCD LE1/YR: CPT | Performed by: NURSE PRACTITIONER

## 2022-10-14 PROCEDURE — G8432 DEP SCR NOT DOC, RNG: HCPCS | Performed by: NURSE PRACTITIONER

## 2022-10-14 PROCEDURE — 3017F COLORECTAL CA SCREEN DOC REV: CPT | Performed by: NURSE PRACTITIONER

## 2022-10-14 PROCEDURE — G0463 HOSPITAL OUTPT CLINIC VISIT: HCPCS | Performed by: NURSE PRACTITIONER

## 2022-10-14 PROCEDURE — G8536 NO DOC ELDER MAL SCRN: HCPCS | Performed by: NURSE PRACTITIONER

## 2022-10-14 PROCEDURE — 1123F ACP DISCUSS/DSCN MKR DOCD: CPT | Performed by: NURSE PRACTITIONER

## 2022-10-14 PROCEDURE — 99213 OFFICE O/P EST LOW 20 MIN: CPT | Performed by: NURSE PRACTITIONER

## 2022-10-14 PROCEDURE — G8420 CALC BMI NORM PARAMETERS: HCPCS | Performed by: NURSE PRACTITIONER

## 2022-10-14 PROCEDURE — G8427 DOCREV CUR MEDS BY ELIG CLIN: HCPCS | Performed by: NURSE PRACTITIONER

## 2022-10-14 RX ORDER — CARBAMAZEPINE 400 MG/1
TABLET, EXTENDED RELEASE ORAL
Qty: 180 TABLET | Refills: 2 | Status: SHIPPED | OUTPATIENT
Start: 2022-10-14

## 2022-10-14 NOTE — PROGRESS NOTES
[address] [place] [facility], [address]  Office:  [phone]  Fax: [phone]  Chief Complaint   Patient presents with    Follow-up     MELNIA Figueredo patient    Results     Labs         HPI: Madiha Oh is in follow-up for epilepsy. He was seen here in initial evaluation in November 2019 by Dr. Flash Ambrocio. He has history of polio affecting the right side of his body. He has had seizures since childhood. He apparently has a drawing up of the right side of his body and he becomes altered. The spells were noted to occur on an irregular basis. They seem to be stress-induced. He lives alone. He was continued on his regimen of Tegretol XR which seems to be controlling the seizures. He was seen by neurology in the hospital in October 2021 when he presented for dizziness. Tegretol level was supratherapeutic, around 15-16. He thinks he may have accidentally taken an extra medication. He was last seen here on 5/2/2022 by MELINA Figueredo. He was with his niece. It was noted that he reported that he had 1 seizure in the interim. Denied missed doses. He continued Tegretol 400 mg twice daily. Labs ordered. Labs on 5/10/2022 showed CBC with differential unremarkable, CMP fairly unremarkable with mildly elevated CO2 of 33, and carbamazepine level was therapeutic, 10.6. He presents today in follow-up. He is alone at his visit but his niece is in the waiting room. His niece helps him. He reports doing ok. Denies seizures in the interim, but sometimes feels nauseated but it goes away. Reports as long as he takes the medication, it's controlled. Continues on Tegretol  mg BID (8 AM and 8 PM). Endorses tolerating it. He feels alright. Sometimes a little nauseated if he gets worried about something. He lives alone. Never drove. Endorses he must have taken an extra dose when he had the high carbamazepine level 1 year ago.   He has a pill container- his niece fills it for him. Denies alcohol use. He quit smoking in . Endorses sleeping well at night. Ambulates with cane for balance. Denies falls. Vitamin D level 31.9 on 2020. Takes a multivitamin. Past Medical History:   Diagnosis Date    Glaucoma     History of poliomyelitis     Hyperlipidemia     Seizures ([address]        Past Surgical History:   Procedure Laterality Date    BIOPSY  2022    Excisional biopsy chest wall    COLONOSCOPY N/A 2019    COLONOSCOPY w bx's performed by Perez Trent MD at [facility]    HX APPENDECTOMY         Current Outpatient Medications   Medication Sig Dispense Refill    carBAMazepine XR (TEGretol XR) 400 mg SR tablet take 1 tablet by mouth twice a day 180 Tablet 2    acetaminophen (TYLENOL) 325 mg tablet Take 2 Tablets by mouth every six (6) hours. Indications: pain 56 Tablet 1    ibuprofen (MOTRIN) 600 mg tablet Take 1 Tablet by mouth every six (6) hours. 28 Tablet 0    bisacodyL 5 mg tab Take 5 mg by mouth daily as needed for PRN Reason (Other) (Constipation). 3 Tablet 0    multivitamins-minerals-lutein (Multivitamin 50 Plus) tab tablet Take 1 Tablet by mouth daily. pravastatin (PRAVACHOL) 10 mg tablet Take 1 Tablet by mouth nightly. 30 Tablet 0    cyanocobalamin 1,000 mcg tablet Take 1 Tab by mouth daily. 30 Tab 0    famotidine (PEPCID) 20 mg tablet Take 1 Tab by mouth two (2) times a day. 60 Tab 0    ferrous sulfate 325 mg (65 mg iron) tablet Take 1 Tab by mouth daily (with breakfast). 30 Tab 0    latanoprost (XALATAN) 0.005 % ophthalmic solution place 1 drop into both eyes at bedtime 2.5 mL 1        No Known Allergies    Social History     Tobacco Use    Smoking status: Former     Types: Cigarettes     Quit date: 2016     Years since quittin.3    Smokeless tobacco: Former     Quit date: 2016   Vaping Use    Vaping Use: Never used   Substance Use Topics    Alcohol use: No    Drug use: No       Family History   Family history unknown:  Yes Review of Systems:  GENERAL: Denies fever or fatigue  CARDIAC: No CP or SOB  PULMONARY: No cough or SOB  MUSCULOSKELETAL: No new joint pain. +reports he broke right hip in 2019, had surgery. NEURO: SEE HPI    Physical Examination:  Visit Vitals  /78 (BP 1 Location: Left upper arm, BP Patient Position: Sitting, BP Cuff Size: Adult)   Pulse 80   Resp 20   Ht 5' 9\" (1.753 m)   Wt 59.7 kg (131 lb 9.6 oz)   SpO2 98%   BMI 19.43 kg/m²       Alert, in NAD. Heart is regular. Oriented x3. Speech is fluent. Speech clear. EOMs are full, PERRL, VFFTC, no nystagmus. No facial asymmetry. Tongue is midline. Strength and tone are normal on the left. He has right hemiparesis and increased tone on the right arm and leg. Right hand  is weaker and with fingers in extension but  strength is good bilaterally. Right upper extremity approximately 4 out of 5 strength and weaker proximally. Right lower extremity approximately 4 out of 5 strength. Right lower extremity underdeveloped. No drift of the bilateral upper extremities. Fine finger movements asymmetrical, cannot perform on right hand. DTRs are increased. He ambulates with a cane with a right hemiparetic gait. Impression/Plan: This is a 33-year-old left-handed male who presents in follow-up for epilepsy. He has had epilepsy since childhood. He has right hemiparesis as a consequence of poliomyelitis versus other congenital injuries. His seizures are under good control on his current regimen of Tegretol  mg twice daily. He had a therapeutic level on last labs in May. He had an admission 1 year ago when he presented with dizziness and had supratherapeutic carbamazepine level. We discussed method for adherence such as pill container so he can make sure he is taking this 1 tab twice a day, and he endorses he has been using a pill container that his niece sets up for him. Discussed signs and symptoms of toxicity on the medication.   Follow up in 6 months. Diagnoses and all orders for this visit:    1. Convulsions, unspecified convulsion type ([facility]  -     carBAMazepine XR (TEGretol XR) 400 mg SR tablet; take 1 tablet by mouth twice a day      Signed By: Magnus Carlisle NP        PLEASE NOTE:   Portions of this document may have been produced using voice recognition software. Unrecognized errors in transcription may be present.

## 2023-05-09 ENCOUNTER — HOSPITAL ENCOUNTER (EMERGENCY)
Facility: HOSPITAL | Age: 73
Discharge: HOME | End: 2023-05-09
Attending: EMERGENCY MEDICINE
Payer: COMMERCIAL

## 2023-05-09 ENCOUNTER — APPOINTMENT (OUTPATIENT)
Facility: HOSPITAL | Age: 73
End: 2023-05-09
Payer: COMMERCIAL

## 2023-05-09 VITALS
DIASTOLIC BLOOD PRESSURE: 75 MMHG | OXYGEN SATURATION: 98 % | HEART RATE: 98 BPM | TEMPERATURE: 97.4 F | SYSTOLIC BLOOD PRESSURE: 143 MMHG | RESPIRATION RATE: 17 BRPM

## 2023-05-09 DIAGNOSIS — U07.1 COVID: Primary | ICD-10-CM

## 2023-05-09 LAB
DEPRECATED S PYO AG THROAT QL EIA: NEGATIVE
FLUAV RNA SPEC QL NAA+PROBE: NOT DETECTED
FLUBV RNA SPEC QL NAA+PROBE: NOT DETECTED
SARS-COV-2 RNA RESP QL NAA+PROBE: DETECTED

## 2023-05-09 PROCEDURE — 87880 STREP A ASSAY W/OPTIC: CPT

## 2023-05-09 PROCEDURE — 70360 X-RAY EXAM OF NECK: CPT

## 2023-05-09 PROCEDURE — 71045 X-RAY EXAM CHEST 1 VIEW: CPT

## 2023-05-09 PROCEDURE — 87636 SARSCOV2 & INF A&B AMP PRB: CPT

## 2023-05-09 PROCEDURE — 6370000000 HC RX 637 (ALT 250 FOR IP): Performed by: PHYSICIAN ASSISTANT

## 2023-05-09 PROCEDURE — 99284 EMERGENCY DEPT VISIT MOD MDM: CPT

## 2023-05-09 PROCEDURE — 87070 CULTURE OTHR SPECIMN AEROBIC: CPT

## 2023-05-09 RX ORDER — ACETAMINOPHEN 325 MG/1
650 TABLET ORAL
Status: COMPLETED | OUTPATIENT
Start: 2023-05-09 | End: 2023-05-09

## 2023-05-09 RX ADMIN — ACETAMINOPHEN 325MG 650 MG: 325 TABLET ORAL at 10:45

## 2023-05-09 ASSESSMENT — ENCOUNTER SYMPTOMS
SORE THROAT: 1
SHORTNESS OF BREATH: 0
DIARRHEA: 0
ABDOMINAL PAIN: 0
VOMITING: 0
NAUSEA: 0

## 2023-05-09 NOTE — ED PROVIDER NOTES
week for reassessment. Discussed strict return precautions, including fever, throat swelling, or any other medical concerns. Pt in agreement with plan. Provider Notes (Medical Decision Making): 58-year-old male who presents to the ED due to sore throat x3 days. Afebrile, nontoxic-appearing, looks well. No evidence of otitis media/externa, strep pharyngitis, PTA, dental abscess, influenza. X-rays without acute process. COVID +. No evidence of tachycardia, tachypnea, hypoxia. Will initiate Paxlovid, recommend close follow-up with PMD, strict return precautions for any new or worsening symptoms. Diagnosis     Clinical Impression:   1. COVID        Disposition: home      [facility] EM[facility]ENCY DEPT  [address] [facility] [address]  [phone]    If symptoms worsen    Adal Deal, [address] [place] [address]  [phone]    Schedule an appointment as soon as possible for a visit             Medication List        START taking these medications      nirmatrelvir/ritonavir 300/100 20 x 150 MG & 10 x 100MG Tbpk  Commonly known as: Paxlovid (300/100)  Take 3 tablets (two 150 mg nirmatrelvir and one 100 mg ritonavir tablets) by mouth every 12 hours for 5 days.             ASK your doctor about these medications      acetaminophen 325 MG tablet  Commonly known as: TYLENOL     bisacodyl 5 MG EC tablet  Commonly known as: DULCOLAX     carBAMazepine 400 MG extended release tablet  Commonly known as: TEGRETOL XR     cyanocobalamin 1000 MCG tablet     famotidine 20 MG tablet  Commonly known as: PEPCID     ferrous sulfate 325 (65 Fe) MG tablet  Commonly known as: IRON 325     ibuprofen 600 MG tablet  Commonly known as: ADVIL;MOTRIN     latanoprost 0.005 % ophthalmic solution  Commonly known as: XALATAN     pravastatin 10 MG tablet  Commonly known as: PRAVACHOL               Where to Get Your Medications        These medications were sent to [address] , [place] [address]

## 2023-05-11 LAB
BACTERIA SPEC CULT: NORMAL
SERVICE CMNT-IMP: NORMAL

## 2023-08-04 NOTE — TELEPHONE ENCOUNTER
Patient mikc called and requested refill on tegretol    She scheduled him a follow up for next available    Please advise

## 2023-08-05 RX ORDER — CARBAMAZEPINE 400 MG/1
400 TABLET, EXTENDED RELEASE ORAL 2 TIMES DAILY
Qty: 60 TABLET | Refills: 2 | Status: SHIPPED | OUTPATIENT
Start: 2023-08-05

## 2023-09-05 ENCOUNTER — OFFICE VISIT (OUTPATIENT)
Age: 73
End: 2023-09-05
Payer: MEDICARE

## 2023-09-05 ENCOUNTER — HOSPITAL ENCOUNTER (OUTPATIENT)
Facility: HOSPITAL | Age: 73
Discharge: HOME | End: 2023-09-08
Payer: MEDICARE

## 2023-09-05 VITALS
HEIGHT: 69 IN | DIASTOLIC BLOOD PRESSURE: 77 MMHG | RESPIRATION RATE: 17 BRPM | OXYGEN SATURATION: 100 % | HEART RATE: 69 BPM | SYSTOLIC BLOOD PRESSURE: 159 MMHG | WEIGHT: 131.8 LBS | TEMPERATURE: 98.1 F | BODY MASS INDEX: 19.52 KG/M2

## 2023-09-05 DIAGNOSIS — Z12.5 PROSTATE CANCER SCREENING: ICD-10-CM

## 2023-09-05 DIAGNOSIS — Z76.89 ENCOUNTER TO ESTABLISH CARE WITH NEW DOCTOR: ICD-10-CM

## 2023-09-05 DIAGNOSIS — Z11.59 ENCOUNTER FOR HEPATITIS C SCREENING TEST FOR LOW RISK PATIENT: ICD-10-CM

## 2023-09-05 DIAGNOSIS — Z13.228 ENCOUNTER FOR SCREENING FOR METABOLIC DISORDER: ICD-10-CM

## 2023-09-05 DIAGNOSIS — Z86.69 HISTORY OF GLAUCOMA: Primary | ICD-10-CM

## 2023-09-05 LAB
ALBUMIN SERPL-MCNC: 4.3 G/DL (ref 3.4–5)
ALBUMIN/GLOB SERPL: 1.5 (ref 0.8–1.7)
ALP SERPL-CCNC: 90 U/L (ref 45–117)
ALT SERPL-CCNC: 21 U/L (ref 16–61)
ANION GAP SERPL CALC-SCNC: 4 MMOL/L (ref 3–18)
APPEARANCE UR: CLEAR
AST SERPL-CCNC: 11 U/L (ref 10–38)
BACTERIA URNS QL MICRO: NEGATIVE /HPF
BASOPHILS # BLD: 0 K/UL (ref 0–0.1)
BASOPHILS NFR BLD: 1 % (ref 0–2)
BILIRUB SERPL-MCNC: 0.4 MG/DL (ref 0.2–1)
BILIRUB UR QL: NEGATIVE
BUN SERPL-MCNC: 11 MG/DL (ref 7–18)
BUN/CREAT SERPL: 12 (ref 12–20)
CALCIUM SERPL-MCNC: 9.7 MG/DL (ref 8.5–10.1)
CHLORIDE SERPL-SCNC: 105 MMOL/L (ref 100–111)
CHOLEST SERPL-MCNC: 258 MG/DL
CO2 SERPL-SCNC: 32 MMOL/L (ref 21–32)
COLOR UR: NORMAL
CREAT SERPL-MCNC: 0.94 MG/DL (ref 0.6–1.3)
DIFFERENTIAL METHOD BLD: NORMAL
EOSINOPHIL # BLD: 0 K/UL (ref 0–0.4)
EOSINOPHIL NFR BLD: 0 % (ref 0–5)
EPITH CASTS URNS QL MICRO: NORMAL /LPF (ref 0–5)
ERYTHROCYTE [DISTWIDTH] IN BLOOD BY AUTOMATED COUNT: 11.9 % (ref 11.6–14.5)
EST. AVERAGE GLUCOSE BLD GHB EST-MCNC: 100 MG/DL
FERRITIN SERPL-MCNC: 93 NG/ML (ref 8–388)
GLOBULIN SER CALC-MCNC: 2.9 G/DL (ref 2–4)
GLUCOSE SERPL-MCNC: 94 MG/DL (ref 74–99)
GLUCOSE UR STRIP.AUTO-MCNC: NEGATIVE MG/DL
HBA1C MFR BLD: 5.1 % (ref 4.2–5.6)
HCT VFR BLD AUTO: 42.5 % (ref 36–48)
HDLC SERPL-MCNC: 148 MG/DL (ref 40–60)
HDLC SERPL: 1.7 (ref 0–5)
HGB BLD-MCNC: 14.2 G/DL (ref 13–16)
HGB UR QL STRIP: NEGATIVE
IMM GRANULOCYTES # BLD AUTO: 0 K/UL (ref 0–0.04)
IMM GRANULOCYTES NFR BLD AUTO: 0 % (ref 0–0.5)
IRON SATN MFR SERPL: 42 % (ref 20–50)
IRON SERPL-MCNC: 109 UG/DL (ref 50–175)
KETONES UR QL STRIP.AUTO: NEGATIVE MG/DL
LDLC SERPL CALC-MCNC: 89.6 MG/DL (ref 0–100)
LEUKOCYTE ESTERASE UR QL STRIP.AUTO: NEGATIVE
LIPID PANEL: ABNORMAL
LYMPHOCYTES # BLD: 3 K/UL (ref 0.9–3.6)
LYMPHOCYTES NFR BLD: 51 % (ref 21–52)
MCH RBC QN AUTO: 31.4 PG (ref 24–34)
MCHC RBC AUTO-ENTMCNC: 33.4 G/DL (ref 31–37)
MCV RBC AUTO: 94 FL (ref 78–100)
MONOCYTES # BLD: 0.2 K/UL (ref 0.05–1.2)
MONOCYTES NFR BLD: 4 % (ref 3–10)
NEUTS SEG # BLD: 2.6 K/UL (ref 1.8–8)
NEUTS SEG NFR BLD: 44 % (ref 40–73)
NITRITE UR QL STRIP.AUTO: NEGATIVE
NRBC # BLD: 0 K/UL (ref 0–0.01)
NRBC BLD-RTO: 0 PER 100 WBC
PH UR STRIP: 7.5 (ref 5–8)
PLATELET # BLD AUTO: 177 K/UL (ref 135–420)
PMV BLD AUTO: 11.2 FL (ref 9.2–11.8)
POTASSIUM SERPL-SCNC: 4.3 MMOL/L (ref 3.5–5.5)
PROT SERPL-MCNC: 7.2 G/DL (ref 6.4–8.2)
PROT UR STRIP-MCNC: NEGATIVE MG/DL
PSA SERPL-MCNC: 1.2 NG/ML (ref 0–4)
RBC # BLD AUTO: 4.52 M/UL (ref 4.35–5.65)
RBC #/AREA URNS HPF: NORMAL /HPF (ref 0–5)
SODIUM SERPL-SCNC: 141 MMOL/L (ref 136–145)
SP GR UR REFRACTOMETRY: 1.03 (ref 1–1.03)
TIBC SERPL-MCNC: 260 UG/DL (ref 250–450)
TRIGL SERPL-MCNC: 102 MG/DL
TSH SERPL DL<=0.05 MIU/L-ACNC: 0.92 UIU/ML (ref 0.36–3.74)
UROBILINOGEN UR QL STRIP.AUTO: 0.2 EU/DL (ref 0.2–1)
VIT B12 SERPL-MCNC: 686 PG/ML (ref 211–911)
VLDLC SERPL CALC-MCNC: 20.4 MG/DL
WBC # BLD AUTO: 5.9 K/UL (ref 4.6–13.2)
WBC URNS QL MICRO: NORMAL /HPF (ref 0–4)

## 2023-09-05 PROCEDURE — 82728 ASSAY OF FERRITIN: CPT

## 2023-09-05 PROCEDURE — G0103 PSA SCREENING: HCPCS

## 2023-09-05 PROCEDURE — 99204 OFFICE O/P NEW MOD 45 MIN: CPT | Performed by: NURSE PRACTITIONER

## 2023-09-05 PROCEDURE — 84443 ASSAY THYROID STIM HORMONE: CPT

## 2023-09-05 PROCEDURE — 81001 URINALYSIS AUTO W/SCOPE: CPT

## 2023-09-05 PROCEDURE — 36415 COLL VENOUS BLD VENIPUNCTURE: CPT

## 2023-09-05 PROCEDURE — 1123F ACP DISCUSS/DSCN MKR DOCD: CPT | Performed by: NURSE PRACTITIONER

## 2023-09-05 PROCEDURE — 80053 COMPREHEN METABOLIC PANEL: CPT

## 2023-09-05 PROCEDURE — 1036F TOBACCO NON-USER: CPT | Performed by: NURSE PRACTITIONER

## 2023-09-05 PROCEDURE — 85025 COMPLETE CBC W/AUTO DIFF WBC: CPT

## 2023-09-05 PROCEDURE — 80061 LIPID PANEL: CPT

## 2023-09-05 PROCEDURE — 83550 IRON BINDING TEST: CPT

## 2023-09-05 PROCEDURE — G8427 DOCREV CUR MEDS BY ELIG CLIN: HCPCS | Performed by: NURSE PRACTITIONER

## 2023-09-05 PROCEDURE — 3017F COLORECTAL CA SCREEN DOC REV: CPT | Performed by: NURSE PRACTITIONER

## 2023-09-05 PROCEDURE — 82607 VITAMIN B-12: CPT

## 2023-09-05 PROCEDURE — G8420 CALC BMI NORM PARAMETERS: HCPCS | Performed by: NURSE PRACTITIONER

## 2023-09-05 PROCEDURE — 83036 HEMOGLOBIN GLYCOSYLATED A1C: CPT

## 2023-09-05 PROCEDURE — 83540 ASSAY OF IRON: CPT

## 2023-09-05 SDOH — ECONOMIC STABILITY: INCOME INSECURITY: HOW HARD IS IT FOR YOU TO PAY FOR THE VERY BASICS LIKE FOOD, HOUSING, MEDICAL CARE, AND HEATING?: NOT HARD AT ALL

## 2023-09-05 SDOH — ECONOMIC STABILITY: FOOD INSECURITY: WITHIN THE PAST 12 MONTHS, THE FOOD YOU BOUGHT JUST DIDN'T LAST AND YOU DIDN'T HAVE MONEY TO GET MORE.: NEVER TRUE

## 2023-09-05 SDOH — ECONOMIC STABILITY: HOUSING INSECURITY
IN THE LAST 12 MONTHS, WAS THERE A TIME WHEN YOU DID NOT HAVE A STEADY PLACE TO SLEEP OR SLEPT IN A SHELTER (INCLUDING NOW)?: NO

## 2023-09-05 SDOH — ECONOMIC STABILITY: FOOD INSECURITY: WITHIN THE PAST 12 MONTHS, YOU WORRIED THAT YOUR FOOD WOULD RUN OUT BEFORE YOU GOT MONEY TO BUY MORE.: NEVER TRUE

## 2023-09-05 ASSESSMENT — ENCOUNTER SYMPTOMS
COUGH: 0
SHORTNESS OF BREATH: 0
CHEST TIGHTNESS: 0
ABDOMINAL PAIN: 0
VOMITING: 0
NAUSEA: 0

## 2023-09-05 ASSESSMENT — PATIENT HEALTH QUESTIONNAIRE - PHQ9
SUM OF ALL RESPONSES TO PHQ9 QUESTIONS 1 & 2: 0
SUM OF ALL RESPONSES TO PHQ QUESTIONS 1-9: 0
SUM OF ALL RESPONSES TO PHQ QUESTIONS 1-9: 0
2. FEELING DOWN, DEPRESSED OR HOPELESS: 0
1. LITTLE INTEREST OR PLEASURE IN DOING THINGS: 0
SUM OF ALL RESPONSES TO PHQ QUESTIONS 1-9: 0
SUM OF ALL RESPONSES TO PHQ QUESTIONS 1-9: 0

## 2023-09-05 NOTE — PROGRESS NOTES
1. \"Have you been to the ER, urgent care clinic since your last visit? Hospitalized since your last visit? \" No    2. \"Have you seen or consulted any other health care providers outside of the [address] [place] [address] since your last visit? \" No     3. For patients aged 43-73: Has the patient had a colonoscopy / FIT/ Cologuard? Yes - Care Gap present.  Rooming MA/LPN to request most recent results
Head: Normocephalic and atraumatic. Pulmonary:      Effort: Pulmonary effort is normal.   Neurological:      General: No focal deficit present. Mental Status: He is alert and oriented to person, place, and time. Psychiatric:         Mood and Affect: Mood normal.         Behavior: Behavior normal.         Thought Content: Thought content normal.         Judgment: Judgment normal.           An electronic signature was used to authenticate this note.     --LI Carias NP on 9/5/2023 at 11:49 AM

## 2023-09-05 NOTE — PATIENT INSTRUCTIONS
will usually file this report, or you can do it yourself. Visit the VAERS website at www.vaers. [facility].gov or call 0-356.816.5900. VAERS is only for reporting reactions, and VAERS staff members do not give medical advice. 6. The National Vaccine Injury Compensation Program    The [facility] Vaccine Injury Compensation Program (VICP) is a federal program that was created to compensate people who may have been injured by certain vaccines. Claims regarding alleged injury or death due to vaccination have a time limit for filing, which may be as short as two years. Visit the VICP website at www.[facility].gov/vaccinecompensation or call 3-232.290.7577 to learn about the program and about filing a claim. 7. How can I learn more?    o Ask your health care provider. o Call your local or state health department. o Visit the website of the Food and Drug Administration (FDA) for vaccine package inserts and additional information at www.fda.gov/vaccines-blood-biologics/vaccines. o Contact the Centers for Disease Control and Prevention (CDC):  - Call 7-362.504.6036 (1-800-CDC-INFO) or  - Visit CDC's influenza website at www.cdc.gov/flu. Vaccine Information Statement   Inactivated Influenza Vaccine   8/6/2021  42 BYRON Betancourt [id number]     Department of Health and Human Services  Centers for Disease Control and Prevention

## 2023-09-20 ENCOUNTER — OFFICE VISIT (OUTPATIENT)
Age: 73
End: 2023-09-20
Payer: MEDICARE

## 2023-09-20 VITALS
RESPIRATION RATE: 18 BRPM | OXYGEN SATURATION: 100 % | TEMPERATURE: 97.5 F | SYSTOLIC BLOOD PRESSURE: 137 MMHG | BODY MASS INDEX: 19.61 KG/M2 | HEIGHT: 69 IN | DIASTOLIC BLOOD PRESSURE: 73 MMHG | WEIGHT: 132.4 LBS | HEART RATE: 79 BPM

## 2023-09-20 DIAGNOSIS — Z23 IMMUNIZATION DUE: ICD-10-CM

## 2023-09-20 DIAGNOSIS — Z23 NEED FOR PROPHYLACTIC VACCINATION AGAINST DIPHTHERIA-TETANUS-PERTUSSIS (DTP): ICD-10-CM

## 2023-09-20 DIAGNOSIS — Z87.891 FORMER SMOKER: ICD-10-CM

## 2023-09-20 DIAGNOSIS — Z00.00 MEDICARE ANNUAL WELLNESS VISIT, SUBSEQUENT: Primary | ICD-10-CM

## 2023-09-20 DIAGNOSIS — N52.9 ERECTILE DYSFUNCTION, UNSPECIFIED ERECTILE DYSFUNCTION TYPE: ICD-10-CM

## 2023-09-20 PROBLEM — J44.9 CHRONIC OBSTRUCTIVE PULMONARY DISEASE, UNSPECIFIED (HCC): Status: ACTIVE | Noted: 2023-09-20

## 2023-09-20 PROCEDURE — 90694 VACC AIIV4 NO PRSRV 0.5ML IM: CPT | Performed by: NURSE PRACTITIONER

## 2023-09-20 PROCEDURE — 1036F TOBACCO NON-USER: CPT | Performed by: NURSE PRACTITIONER

## 2023-09-20 PROCEDURE — G0439 PPPS, SUBSEQ VISIT: HCPCS | Performed by: NURSE PRACTITIONER

## 2023-09-20 PROCEDURE — 3017F COLORECTAL CA SCREEN DOC REV: CPT | Performed by: NURSE PRACTITIONER

## 2023-09-20 PROCEDURE — G8420 CALC BMI NORM PARAMETERS: HCPCS | Performed by: NURSE PRACTITIONER

## 2023-09-20 PROCEDURE — G8427 DOCREV CUR MEDS BY ELIG CLIN: HCPCS | Performed by: NURSE PRACTITIONER

## 2023-09-20 PROCEDURE — 99213 OFFICE O/P EST LOW 20 MIN: CPT | Performed by: NURSE PRACTITIONER

## 2023-09-20 PROCEDURE — 1123F ACP DISCUSS/DSCN MKR DOCD: CPT | Performed by: NURSE PRACTITIONER

## 2023-09-20 PROCEDURE — PBSHW INFLUENZA, FLUAD, (AGE 65 Y+), IM, PF, 0.5 ML: Performed by: NURSE PRACTITIONER

## 2023-09-20 RX ORDER — SILDENAFIL 25 MG/1
25 TABLET, FILM COATED ORAL PRN
Qty: 9 TABLET | Refills: 0 | Status: SHIPPED | OUTPATIENT
Start: 2023-09-20

## 2023-09-20 RX ORDER — PRAVASTATIN SODIUM 10 MG
10 TABLET ORAL DAILY
Qty: 90 TABLET | Refills: 3 | Status: SHIPPED | OUTPATIENT
Start: 2023-09-20

## 2023-09-20 RX ORDER — FERROUS SULFATE 325(65) MG
325 TABLET ORAL
Qty: 90 TABLET | Refills: 3 | Status: SHIPPED | OUTPATIENT
Start: 2023-09-20

## 2023-09-20 RX ORDER — FAMOTIDINE 20 MG/1
20 TABLET, FILM COATED ORAL 2 TIMES DAILY
Qty: 60 TABLET | Refills: 3 | Status: SHIPPED | OUTPATIENT
Start: 2023-09-20

## 2023-09-20 RX ORDER — CARBAMAZEPINE 400 MG/1
400 TABLET, EXTENDED RELEASE ORAL 2 TIMES DAILY
Qty: 60 TABLET | Refills: 2 | Status: SHIPPED | OUTPATIENT
Start: 2023-09-20

## 2023-09-20 ASSESSMENT — ENCOUNTER SYMPTOMS
VOMITING: 0
CHEST TIGHTNESS: 0
ABDOMINAL PAIN: 0
NAUSEA: 0
SHORTNESS OF BREATH: 0
COUGH: 0

## 2023-09-20 ASSESSMENT — PATIENT HEALTH QUESTIONNAIRE - PHQ9
SUM OF ALL RESPONSES TO PHQ QUESTIONS 1-9: 0
SUM OF ALL RESPONSES TO PHQ9 QUESTIONS 1 & 2: 0
1. LITTLE INTEREST OR PLEASURE IN DOING THINGS: 0
SUM OF ALL RESPONSES TO PHQ QUESTIONS 1-9: 0
2. FEELING DOWN, DEPRESSED OR HOPELESS: 0

## 2023-09-20 ASSESSMENT — LIFESTYLE VARIABLES
HOW OFTEN DO YOU HAVE A DRINK CONTAINING ALCOHOL: NEVER
HOW MANY STANDARD DRINKS CONTAINING ALCOHOL DO YOU HAVE ON A TYPICAL DAY: PATIENT DOES NOT DRINK

## 2023-09-20 NOTE — PROGRESS NOTES
1. \"Have you been to the ER, urgent care clinic since your last visit? Hospitalized since your last visit? \" No    2. \"Have you seen or consulted any other health care providers outside of the [address] [place] [address] since your last visit? \" No     3. For patients aged 43-73: Has the patient had a colonoscopy / FIT/ Cologuard? Yes - Care Gap present.  Most recent result on file
atraumatic. Right Ear: Tympanic membrane normal.      Left Ear: Tympanic membrane normal.      Nose: Nose normal.      Mouth/Throat:      Mouth: Mucous membranes are moist.      Pharynx: Oropharynx is clear. Eyes:      Extraocular Movements: Extraocular movements intact. Conjunctiva/sclera: Conjunctivae normal.      Pupils: Pupils are equal, round, and reactive to light. Cardiovascular:      Rate and Rhythm: Normal rate and regular rhythm. Pulses: Normal pulses. Heart sounds: Normal heart sounds. Pulmonary:      Effort: Pulmonary effort is normal.      Breath sounds: Normal breath sounds. Abdominal:      General: Bowel sounds are normal.      Palpations: Abdomen is soft. Musculoskeletal:         General: Normal range of motion. Cervical back: Normal range of motion and neck supple. Skin:     General: Skin is warm and dry. Capillary Refill: Capillary refill takes less than 2 seconds. Neurological:      General: No focal deficit present. Mental Status: He is alert and oriented to person, place, and time. Mental status is at baseline. Psychiatric:         Mood and Affect: Mood normal.         Behavior: Behavior normal.         Thought Content: Thought content normal.         Judgment: Judgment normal.             An electronic signature was used to authenticate this note. --LI Ibrahim - NP on 9/27/2023 at 7:16 PM            Medicare Annual Wellness Visit    Yaima Camacho is here for Medicare AWV and Discuss Labs    Assessment & Plan   Medicare annual wellness visit, subsequent  Former smoker  Erectile dysfunction, unspecified erectile dysfunction type  -     sildenafil (VIAGRA) 25 MG tablet;  Take 1 tablet by mouth as needed for Erectile Dysfunction, Disp-9 tablet, R-0Normal  Immunization due  -     Influenza, FLUAD, (age 72 y+), IM, Preservative Free, 0.5 mL  Need for prophylactic vaccination against diphtheria-tetanus-pertussis (DTP)    Recommendations

## 2023-10-16 ENCOUNTER — OFFICE VISIT (OUTPATIENT)
Age: 73
End: 2023-10-16
Payer: MEDICARE

## 2023-10-16 VITALS
BODY MASS INDEX: 20.59 KG/M2 | SYSTOLIC BLOOD PRESSURE: 160 MMHG | RESPIRATION RATE: 18 BRPM | HEIGHT: 69 IN | HEART RATE: 67 BPM | WEIGHT: 139 LBS | DIASTOLIC BLOOD PRESSURE: 80 MMHG | OXYGEN SATURATION: 99 %

## 2023-10-16 DIAGNOSIS — G81.01 FLACCID HEMIPLEGIA AFFECTING RIGHT DOMINANT SIDE, UNSPECIFIED ETIOLOGY (HCC): ICD-10-CM

## 2023-10-16 DIAGNOSIS — G40.109 PARTIAL EPILEPSY (HCC): Primary | ICD-10-CM

## 2023-10-16 PROCEDURE — G8427 DOCREV CUR MEDS BY ELIG CLIN: HCPCS | Performed by: PSYCHIATRY & NEUROLOGY

## 2023-10-16 PROCEDURE — 3017F COLORECTAL CA SCREEN DOC REV: CPT | Performed by: PSYCHIATRY & NEUROLOGY

## 2023-10-16 PROCEDURE — 1123F ACP DISCUSS/DSCN MKR DOCD: CPT | Performed by: PSYCHIATRY & NEUROLOGY

## 2023-10-16 PROCEDURE — 1036F TOBACCO NON-USER: CPT | Performed by: PSYCHIATRY & NEUROLOGY

## 2023-10-16 PROCEDURE — G8484 FLU IMMUNIZE NO ADMIN: HCPCS | Performed by: PSYCHIATRY & NEUROLOGY

## 2023-10-16 PROCEDURE — G8420 CALC BMI NORM PARAMETERS: HCPCS | Performed by: PSYCHIATRY & NEUROLOGY

## 2023-10-16 PROCEDURE — 99212 OFFICE O/P EST SF 10 MIN: CPT | Performed by: PSYCHIATRY & NEUROLOGY

## 2023-10-16 NOTE — PROGRESS NOTES
He is here for yearly follow-up of his seizures. He reports that since his last visit a year ago he has not had any breakthrough events. He reports that he has been compliant with his Tegretol and denies any side effects of medication. He has not had any new medical problems other than a few aches and pains. He is alert and appropriate. Speech fluent and clear. Cranial nerves are intact. There is spastic weakness of the right side as compared to left. He walks with use of a cane. There is atrophy of the right leg as compared to the left. Sensations intact. Reflexes are symmetric. No abnormal movements are seen. Rapid alternating movements are slowed more on the right than the left. He had lab work done in September and had an unremarkable CBC and serum chemistries. His primary care is taking care of his refills. We will plan to see him back in follow-up in a year.

## 2023-12-13 ENCOUNTER — HOSPITAL ENCOUNTER (EMERGENCY)
Facility: HOSPITAL | Age: 73
Discharge: HOME | End: 2023-12-13
Payer: MEDICARE

## 2023-12-13 ENCOUNTER — APPOINTMENT (OUTPATIENT)
Facility: HOSPITAL | Age: 73
End: 2023-12-13
Payer: MEDICARE

## 2023-12-13 VITALS
DIASTOLIC BLOOD PRESSURE: 90 MMHG | HEART RATE: 77 BPM | RESPIRATION RATE: 16 BRPM | WEIGHT: 139 LBS | BODY MASS INDEX: 20.59 KG/M2 | SYSTOLIC BLOOD PRESSURE: 158 MMHG | OXYGEN SATURATION: 99 % | HEIGHT: 69 IN | TEMPERATURE: 98 F

## 2023-12-13 DIAGNOSIS — S39.012A BACK STRAIN, INITIAL ENCOUNTER: Primary | ICD-10-CM

## 2023-12-13 LAB
ALBUMIN SERPL-MCNC: 4.4 G/DL (ref 3.4–5)
ALBUMIN/GLOB SERPL: 1.4 (ref 0.8–1.7)
ALP SERPL-CCNC: 84 U/L (ref 45–117)
ALT SERPL-CCNC: 31 U/L (ref 16–61)
ANION GAP SERPL CALC-SCNC: 3 MMOL/L (ref 3–18)
APPEARANCE UR: CLEAR
AST SERPL-CCNC: 17 U/L (ref 10–38)
BASOPHILS # BLD: 0 K/UL (ref 0–0.1)
BASOPHILS NFR BLD: 0 % (ref 0–2)
BILIRUB SERPL-MCNC: 0.6 MG/DL (ref 0.2–1)
BILIRUB UR QL: NEGATIVE
BUN SERPL-MCNC: 10 MG/DL (ref 7–18)
BUN/CREAT SERPL: 11 (ref 12–20)
CALCIUM SERPL-MCNC: 9.9 MG/DL (ref 8.5–10.1)
CHLORIDE SERPL-SCNC: 104 MMOL/L (ref 100–111)
CO2 SERPL-SCNC: 32 MMOL/L (ref 21–32)
COLOR UR: YELLOW
CREAT SERPL-MCNC: 0.94 MG/DL (ref 0.6–1.3)
DIFFERENTIAL METHOD BLD: NORMAL
EOSINOPHIL # BLD: 0 K/UL (ref 0–0.4)
EOSINOPHIL NFR BLD: 0 % (ref 0–5)
ERYTHROCYTE [DISTWIDTH] IN BLOOD BY AUTOMATED COUNT: 11.9 % (ref 11.6–14.5)
GLOBULIN SER CALC-MCNC: 3.1 G/DL (ref 2–4)
GLUCOSE SERPL-MCNC: 97 MG/DL (ref 74–99)
GLUCOSE UR STRIP.AUTO-MCNC: NEGATIVE MG/DL
HCT VFR BLD AUTO: 45.2 % (ref 36–48)
HGB BLD-MCNC: 15 G/DL (ref 13–16)
HGB UR QL STRIP: NEGATIVE
IMM GRANULOCYTES # BLD AUTO: 0 K/UL (ref 0–0.04)
IMM GRANULOCYTES NFR BLD AUTO: 0 % (ref 0–0.5)
KETONES UR QL STRIP.AUTO: NEGATIVE MG/DL
LEUKOCYTE ESTERASE UR QL STRIP.AUTO: NEGATIVE
LIPASE SERPL-CCNC: 33 U/L (ref 13–75)
LYMPHOCYTES # BLD: 3.5 K/UL (ref 0.9–3.6)
LYMPHOCYTES NFR BLD: 47 % (ref 21–52)
MCH RBC QN AUTO: 31 PG (ref 24–34)
MCHC RBC AUTO-ENTMCNC: 33.2 G/DL (ref 31–37)
MCV RBC AUTO: 93.4 FL (ref 78–100)
MONOCYTES # BLD: 0.3 K/UL (ref 0.05–1.2)
MONOCYTES NFR BLD: 4 % (ref 3–10)
NEUTS SEG # BLD: 3.5 K/UL (ref 1.8–8)
NEUTS SEG NFR BLD: 48 % (ref 40–73)
NITRITE UR QL STRIP.AUTO: NEGATIVE
NRBC # BLD: 0 K/UL (ref 0–0.01)
NRBC BLD-RTO: 0 PER 100 WBC
PH UR STRIP: 6.5 (ref 5–8)
PLATELET # BLD AUTO: 202 K/UL (ref 135–420)
PMV BLD AUTO: 10 FL (ref 9.2–11.8)
POTASSIUM SERPL-SCNC: 4.1 MMOL/L (ref 3.5–5.5)
PROT SERPL-MCNC: 7.5 G/DL (ref 6.4–8.2)
PROT UR STRIP-MCNC: NEGATIVE MG/DL
RBC # BLD AUTO: 4.84 M/UL (ref 4.35–5.65)
SODIUM SERPL-SCNC: 139 MMOL/L (ref 136–145)
SP GR UR REFRACTOMETRY: 1.01 (ref 1–1.03)
UROBILINOGEN UR QL STRIP.AUTO: 0.2 EU/DL (ref 0.2–1)
WBC # BLD AUTO: 7.4 K/UL (ref 4.6–13.2)

## 2023-12-13 PROCEDURE — 81003 URINALYSIS AUTO W/O SCOPE: CPT

## 2023-12-13 PROCEDURE — 74177 CT ABD & PELVIS W/CONTRAST: CPT

## 2023-12-13 PROCEDURE — 85025 COMPLETE CBC W/AUTO DIFF WBC: CPT

## 2023-12-13 PROCEDURE — 99285 EMERGENCY DEPT VISIT HI MDM: CPT

## 2023-12-13 PROCEDURE — 6370000000 HC RX 637 (ALT 250 FOR IP): Performed by: PHYSICIAN ASSISTANT

## 2023-12-13 PROCEDURE — 83690 ASSAY OF LIPASE: CPT

## 2023-12-13 PROCEDURE — 80053 COMPREHEN METABOLIC PANEL: CPT

## 2023-12-13 PROCEDURE — 6360000004 HC RX CONTRAST MEDICATION: Performed by: PHYSICIAN ASSISTANT

## 2023-12-13 RX ORDER — ACETAMINOPHEN 500 MG
1000 TABLET ORAL EVERY 6 HOURS PRN
Qty: 56 TABLET | Refills: 0 | Status: SHIPPED | OUTPATIENT
Start: 2023-12-13 | End: 2023-12-20

## 2023-12-13 RX ORDER — ACETAMINOPHEN 325 MG/1
650 TABLET ORAL
Status: COMPLETED | OUTPATIENT
Start: 2023-12-13 | End: 2023-12-13

## 2023-12-13 RX ORDER — LIDOCAINE 4 G/G
1 PATCH TOPICAL
Status: DISCONTINUED | OUTPATIENT
Start: 2023-12-13 | End: 2023-12-13 | Stop reason: HOSPADM

## 2023-12-13 RX ORDER — LIDOCAINE 50 MG/G
1 PATCH TOPICAL DAILY
Qty: 10 PATCH | Refills: 0 | Status: SHIPPED | OUTPATIENT
Start: 2023-12-13 | End: 2023-12-23

## 2023-12-13 RX ADMIN — ACETAMINOPHEN 325MG 650 MG: 325 TABLET ORAL at 11:27

## 2023-12-13 RX ADMIN — IOPAMIDOL 90 ML: 612 INJECTION, SOLUTION INTRAVENOUS at 13:01

## 2023-12-13 ASSESSMENT — PAIN DESCRIPTION - LOCATION
LOCATION: BACK
LOCATION: ABDOMEN

## 2023-12-13 ASSESSMENT — PAIN DESCRIPTION - PAIN TYPE: TYPE: ACUTE PAIN

## 2023-12-13 ASSESSMENT — PAIN SCALES - GENERAL
PAINLEVEL_OUTOF10: 9
PAINLEVEL_OUTOF10: 5

## 2023-12-13 ASSESSMENT — PAIN DESCRIPTION - ORIENTATION
ORIENTATION: LEFT
ORIENTATION: LEFT

## 2023-12-13 ASSESSMENT — LIFESTYLE VARIABLES
HOW MANY STANDARD DRINKS CONTAINING ALCOHOL DO YOU HAVE ON A TYPICAL DAY: PATIENT DOES NOT DRINK
HOW OFTEN DO YOU HAVE A DRINK CONTAINING ALCOHOL: NEVER

## 2023-12-13 ASSESSMENT — PAIN - FUNCTIONAL ASSESSMENT: PAIN_FUNCTIONAL_ASSESSMENT: 0-10

## 2023-12-13 ASSESSMENT — PAIN DESCRIPTION - DESCRIPTORS: DESCRIPTORS: ACHING

## 2023-12-13 NOTE — ED PROVIDER NOTES
EMERGENCY DEPARTMENT HISTORY AND PHYSICAL EXAM    4:18 PM      Date: 12/13/2023  Patient Name: Yuliya Noonan    History of Presenting Illness     Chief Complaint   Patient presents with    Abdominal Pain       History Provided By: Patient    Additional History (Context): Yuliya Noonan is a 74 y.o. male with   Past Medical History:   Diagnosis Date    Arthritis     Glaucoma     History of abdominal paracentesis     History of poliomyelitis     Hyperlipidemia     Seizures ([address])    } who presents with complaint of left flank and left lower quadrant abdominal pain x 2 days. Patient's pain is reproducible with palpation and worse with movement especially bending over. Patient denies fever or chills, chest pain, dyspnea, nausea or vomiting, diarrhea, constipation, dysuria, hematuria. Denies fall or trauma, numbness or tingling, extremity weakness. Denies hx of pyelonephritis, nephrolithiasis, diverticulitis. Patient notes he has tried over-the-counter medication for symptoms without relief. PCP: LI Paige NP    No current facility-administered medications for this encounter. Current Outpatient Medications   Medication Sig Dispense Refill    lidocaine (LIDODERM) 5 % Place 1 patch onto the skin daily for 10 days 12 hours on, 12 hours off.  10 patch 0    acetaminophen (TYLENOL) 500 MG tablet Take 2 tablets by mouth every 6 hours as needed for Pain 56 tablet 0    sildenafil (VIAGRA) 25 MG tablet Take 1 tablet by mouth as needed for Erectile Dysfunction 9 tablet 0    pravastatin (PRAVACHOL) 10 MG tablet Take 1 tablet by mouth daily 90 tablet 3    carBAMazepine (TEGRETOL XR) 400 MG extended release tablet Take 1 tablet by mouth 2 times daily 60 tablet 2    famotidine (PEPCID) 20 MG tablet Take 1 tablet by mouth 2 times daily 60 tablet 3    ferrous sulfate (IRON 325) 325 (65 Fe) MG tablet Take 1 tablet by mouth daily (with breakfast) 90 tablet 3    cyanocobalamin 1000 MCG tablet Take 1 tablet by mouth

## 2023-12-13 NOTE — ED NOTES
Pt was given discharge instructions pt verbalized understanding.      Fernanda Avendano RN  12/13/23 0505

## 2024-02-12 ENCOUNTER — TELEPHONE (OUTPATIENT)
Age: 74
End: 2024-02-12

## 2024-02-12 DIAGNOSIS — G40.109 PARTIAL EPILEPSY (HCC): Primary | ICD-10-CM

## 2024-02-12 NOTE — TELEPHONE ENCOUNTER
Last appointment: 09/20/2023    Next appointment: 03/20/2024    Patient requesting refill for     carBAMazepine (TEGRETOL XR) 400 MG extended release tablet [[id number]]    Pharmacy   [facility] - [place], [place] - [address] - P [phone] - F [phone]  [address] [place] [address]  Phone: [phone]  Fax: [phone]

## 2024-02-12 NOTE — TELEPHONE ENCOUNTER
Medication name: carBAMazepine (TEGRETOL XR) 400 MG extended release tablet     Last appointment: 09/20/23    Next appointment: 03/20/24    : 11/16/23    Pharmacy: [email or web address]

## 2024-02-14 RX ORDER — CARBAMAZEPINE 400 MG/1
400 TABLET, EXTENDED RELEASE ORAL 2 TIMES DAILY
Qty: 60 TABLET | Refills: 5 | Status: SHIPPED | OUTPATIENT
Start: 2024-02-14

## 2024-03-20 ENCOUNTER — OFFICE VISIT (OUTPATIENT)
Age: 74
End: 2024-03-20
Payer: MEDICARE

## 2024-03-20 VITALS
HEART RATE: 80 BPM | DIASTOLIC BLOOD PRESSURE: 72 MMHG | RESPIRATION RATE: 20 BRPM | TEMPERATURE: 97.8 F | HEIGHT: 69 IN | WEIGHT: 135.6 LBS | SYSTOLIC BLOOD PRESSURE: 137 MMHG | BODY MASS INDEX: 20.08 KG/M2 | OXYGEN SATURATION: 99 %

## 2024-03-20 DIAGNOSIS — G80.9 CEREBRAL PALSY, UNSPECIFIED TYPE (HCC): ICD-10-CM

## 2024-03-20 DIAGNOSIS — J44.9 CHRONIC OBSTRUCTIVE PULMONARY DISEASE, UNSPECIFIED COPD TYPE (HCC): ICD-10-CM

## 2024-03-20 DIAGNOSIS — M25.552 ACUTE PAIN OF LEFT HIP: ICD-10-CM

## 2024-03-20 DIAGNOSIS — E78.5 HYPERLIPIDEMIA, UNSPECIFIED HYPERLIPIDEMIA TYPE: Primary | ICD-10-CM

## 2024-03-20 DIAGNOSIS — K21.9 GASTROESOPHAGEAL REFLUX DISEASE, UNSPECIFIED WHETHER ESOPHAGITIS PRESENT: ICD-10-CM

## 2024-03-20 DIAGNOSIS — G40.109 PARTIAL EPILEPSY (HCC): ICD-10-CM

## 2024-03-20 DIAGNOSIS — G81.01 FLACCID HEMIPLEGIA AFFECTING RIGHT DOMINANT SIDE, UNSPECIFIED ETIOLOGY (HCC): ICD-10-CM

## 2024-03-20 DIAGNOSIS — N52.9 ERECTILE DYSFUNCTION, UNSPECIFIED ERECTILE DYSFUNCTION TYPE: ICD-10-CM

## 2024-03-20 PROCEDURE — 3023F SPIROM DOC REV: CPT | Performed by: NURSE PRACTITIONER

## 2024-03-20 PROCEDURE — G8484 FLU IMMUNIZE NO ADMIN: HCPCS | Performed by: NURSE PRACTITIONER

## 2024-03-20 PROCEDURE — 1123F ACP DISCUSS/DSCN MKR DOCD: CPT | Performed by: NURSE PRACTITIONER

## 2024-03-20 PROCEDURE — G8427 DOCREV CUR MEDS BY ELIG CLIN: HCPCS | Performed by: NURSE PRACTITIONER

## 2024-03-20 PROCEDURE — G8420 CALC BMI NORM PARAMETERS: HCPCS | Performed by: NURSE PRACTITIONER

## 2024-03-20 PROCEDURE — 99214 OFFICE O/P EST MOD 30 MIN: CPT | Performed by: NURSE PRACTITIONER

## 2024-03-20 PROCEDURE — 3017F COLORECTAL CA SCREEN DOC REV: CPT | Performed by: NURSE PRACTITIONER

## 2024-03-20 PROCEDURE — 1036F TOBACCO NON-USER: CPT | Performed by: NURSE PRACTITIONER

## 2024-03-20 RX ORDER — SILDENAFIL 25 MG/1
25 TABLET, FILM COATED ORAL PRN
Qty: 30 TABLET | Refills: 0 | Status: SHIPPED | OUTPATIENT
Start: 2024-03-20

## 2024-03-20 ASSESSMENT — ENCOUNTER SYMPTOMS
CHEST TIGHTNESS: 0
VOMITING: 0
ABDOMINAL PAIN: 0
NAUSEA: 0
COUGH: 0

## 2024-03-20 ASSESSMENT — PATIENT HEALTH QUESTIONNAIRE - PHQ9
SUM OF ALL RESPONSES TO PHQ QUESTIONS 1-9: 0
1. LITTLE INTEREST OR PLEASURE IN DOING THINGS: NOT AT ALL
2. FEELING DOWN, DEPRESSED OR HOPELESS: NOT AT ALL
SUM OF ALL RESPONSES TO PHQ QUESTIONS 1-9: 0
SUM OF ALL RESPONSES TO PHQ QUESTIONS 1-9: 0
SUM OF ALL RESPONSES TO PHQ9 QUESTIONS 1 & 2: 0
SUM OF ALL RESPONSES TO PHQ QUESTIONS 1-9: 0

## 2024-03-20 NOTE — PATIENT INSTRUCTIONS
Vaccine Information Statement    Tdap (Tetanus, Diphtheria, Pertussis) Vaccine: What You Need to Know     Many vaccine information statements are available in [place] and other languages. See www.immunize.org/vis.  Hojas de información sobre vacunas están disponibles en español y en muchos otros idiomas. Visite www.immunize.org/vis.    1. Why get vaccinated?    Tdap vaccine can prevent tetanus, diphtheria, and pertussis.    Diphtheria and pertussis spread from person to person. Tetanus enters the body through cuts or wounds.    o TETANUS (T) causes painful stiffening of the muscles. Tetanus can lead to serious health problems, including being unable to open the mouth, having trouble swallowing and breathing, or death.     o DIPHTHERIA (D) can lead to difficulty breathing, heart failure, paralysis, or death.     o PERTUSSIS (aP), also known as \"whooping cough,\" can cause uncontrollable, violent coughing that makes it hard to breathe, eat, or drink. Pertussis can be extremely serious especially in babies and young children, causing pneumonia, convulsions, brain damage, or death.  In teens and adults, it can cause weight loss, loss of bladder control, passing out, and rib fractures from severe coughing.      2. Tdap vaccine     Tdap is only for children 7 years and older, adolescents, and adults.     Adolescents should receive a single dose of Tdap, preferably at age 11 or 12 years.     Pregnant people should get a dose of Tdap during every pregnancy, preferably during the early part of the third trimester, to help protect the  from pertussis. Infants are most at risk for severe, life-threatening complications from pertussis.    Adults who have never received Tdap should get a dose of Tdap.      Also, adults should receive a booster dose of either Tdap or Td (a different vaccine that protects against tetanus and diphtheria but not pertussis) every 10 years, or after 5 years in the case of a severe or dirty wound

## 2024-03-20 NOTE — PROGRESS NOTES
1. \"Have you been to the ER, urgent care clinic since your last visit?  Hospitalized since your last visit?\" Yes When: 12/13/23 Where: [facility] Reason for visit: back strain    2. \"Have you seen or consulted any other health care providers outside of the [facility] since your last visit?\" No     3. For patients aged 45-75: Has the patient had a colonoscopy / FIT/ Cologuard? Yes - Care Gap present. Most recent result on file

## 2024-03-20 NOTE — PROGRESS NOTES
Noble Delgado (:  1950) is a 73 y.o. male, here for evaluation of the following medical concerns:  Chief Complaint   Patient presents with    Hip Pain     L hip pain intermittent  Denies numbness and tingling   Doesn't radiate down leg stays localized   Patient states that getting up from sitting makes it worst.   Asprin cream helps a little         ASSESSMENT/PLAN:    1. Hyperlipidemia, unspecified hyperlipidemia type  -     Lipid Panel; Future  -     Comprehensive Metabolic Panel; Future  2. Gastroesophageal reflux disease, unspecified whether esophagitis present  -     Comprehensive Metabolic Panel; Future  3. Flaccid hemiplegia affecting right dominant side, unspecified etiology (HCC)  Assessment & Plan:   Monitored by specialist- no acute findings meriting change in the plan  4. Chronic obstructive pulmonary disease, unspecified COPD type ([facility])  Assessment & Plan:  No change in plan of care merited today, continue to monitor   5. Cerebral palsy, unspecified type ([facility])  Assessment & Plan:   Monitored by specialist- no acute findings meriting change in the plan  6. Partial epilepsy ([facility])  Assessment & Plan:   Monitored by specialist- no acute findings meriting change in the plan  7. Acute pain of left hip  -     XR HIP LEFT (2-3 VIEWS); Future  8. Erectile dysfunction, unspecified erectile dysfunction type  -     sildenafil (VIAGRA) 25 MG tablet; Take 1 tablet by mouth as needed for Erectile Dysfunction, Disp-30 tablet, R-0Normal     Return in about 6 months (around 2024) for [id number], ANNUAL PHYSICAL.      HPI  Here for 6 month f/u to [facility], gerd      Annual AWV 2023  Vaccines due-handouts  Tdap  Shingrix  Rsv    AAA- history of former smoker quit      OA--Chronic right hip pain that radiates to the knee, nagging type of pain, using tylenol OA but no relief. Hx of right femur fracture with hardware. Onset- 2019, worse with weather changes. Trial nsaid adjunctively with tylenol,

## 2024-04-29 ENCOUNTER — APPOINTMENT (OUTPATIENT)
Facility: HOSPITAL | Age: 74
End: 2024-04-29
Payer: MEDICARE

## 2024-04-29 ENCOUNTER — HOSPITAL ENCOUNTER (EMERGENCY)
Facility: HOSPITAL | Age: 74
Discharge: HOME | End: 2024-04-29
Payer: MEDICARE

## 2024-04-29 VITALS
OXYGEN SATURATION: 98 % | RESPIRATION RATE: 12 BRPM | BODY MASS INDEX: 19.99 KG/M2 | WEIGHT: 135 LBS | DIASTOLIC BLOOD PRESSURE: 85 MMHG | HEART RATE: 77 BPM | HEIGHT: 69 IN | TEMPERATURE: 97.6 F | SYSTOLIC BLOOD PRESSURE: 139 MMHG

## 2024-04-29 DIAGNOSIS — S92.422B OPEN DISPLACED FRACTURE OF DISTAL PHALANX OF LEFT GREAT TOE, INITIAL ENCOUNTER: Primary | ICD-10-CM

## 2024-04-29 PROCEDURE — 2580000003 HC RX 258: Performed by: HEALTH CARE PROVIDER

## 2024-04-29 PROCEDURE — 90715 TDAP VACCINE 7 YRS/> IM: CPT | Performed by: HEALTH CARE PROVIDER

## 2024-04-29 PROCEDURE — 99284 EMERGENCY DEPT VISIT MOD MDM: CPT

## 2024-04-29 PROCEDURE — 90471 IMMUNIZATION ADMIN: CPT | Performed by: HEALTH CARE PROVIDER

## 2024-04-29 PROCEDURE — 73660 X-RAY EXAM OF TOE(S): CPT

## 2024-04-29 PROCEDURE — 6360000002 HC RX W HCPCS: Performed by: HEALTH CARE PROVIDER

## 2024-04-29 PROCEDURE — 96374 THER/PROPH/DIAG INJ IV PUSH: CPT

## 2024-04-29 PROCEDURE — 12002 RPR S/N/AX/GEN/TRNK2.6-7.5CM: CPT

## 2024-04-29 RX ORDER — ACETAMINOPHEN 500 MG
1000 TABLET ORAL EVERY 6 HOURS PRN
Qty: 30 TABLET | Refills: 0 | Status: SHIPPED | OUTPATIENT
Start: 2024-04-29

## 2024-04-29 RX ADMIN — TETANUS TOXOID, REDUCED DIPHTHERIA TOXOID AND ACELLULAR PERTUSSIS VACCINE, ADSORBED 0.5 ML: 5; 2.5; 8; 8; 2.5 SUSPENSION INTRAMUSCULAR at 13:02

## 2024-04-29 RX ADMIN — WATER 2000 MG: 1 INJECTION INTRAMUSCULAR; INTRAVENOUS; SUBCUTANEOUS at 13:03

## 2024-04-29 ASSESSMENT — PAIN - FUNCTIONAL ASSESSMENT: PAIN_FUNCTIONAL_ASSESSMENT: NONE - DENIES PAIN

## 2024-04-29 ASSESSMENT — ENCOUNTER SYMPTOMS
NAUSEA: 0
SHORTNESS OF BREATH: 0
CHEST TIGHTNESS: 0
VOMITING: 0
COUGH: 0
ABDOMINAL PAIN: 0
BACK PAIN: 0
DIARRHEA: 0

## 2024-04-29 NOTE — ED TRIAGE NOTES
Patient presented to the Emergency Dept with a complaint of got to go to restroom, toe bend under and sustained laceration to left great toe.     Laceration noted below left great toe nail bed, with minimal bleeding noted. Same irrigated and cleaned and covered with gauze    Nil blood thinner    Patient alert and oriented x 4, patient breathes freely on room air in nil cardiopulmonary distress

## 2024-04-29 NOTE — DISCHARGE INSTRUCTIONS
Keep your dressing in place until seen by orthopedics.   Dr. Guidry will see you in office Thursday, but call the office tomorrow to confirm the appointment has been made.    Return to emergency department if on tolerable pain.

## 2024-04-29 NOTE — ED PROVIDER NOTES
abdominal paracentesis     History of poliomyelitis     Hyperlipidemia     Seizures (HCC)        Past Surgical History:  Past Surgical History:   Procedure Laterality Date    APPENDECTOMY      BIOPSY  2022    Excisional biopsy chest wall    COLONOSCOPY N/A 2019    COLONOSCOPY w bx's performed by Bhaskar Mujica MD at [facility] ENDOSCOPY    HIP SURGERY         Family History:  No family history on file.    Social History:  Social History     Tobacco Use    Smoking status: Former     Current packs/day: 0.00     Types: Cigarettes     Quit date: 2016     Years since quittin.9     Passive exposure: Never    Smokeless tobacco: Former     Quit date: 2016   Vaping Use    Vaping Use: Never used   Substance Use Topics    Alcohol use: No    Drug use: No       Allergies:  No Known Allergies      Review of Systems   Review of Systems   Constitutional:  Negative for appetite change, fatigue and fever.   Respiratory:  Negative for cough, chest tightness and shortness of breath.    Cardiovascular:  Negative for chest pain, palpitations and leg swelling.   Gastrointestinal:  Negative for abdominal pain, diarrhea, nausea and vomiting.   Genitourinary:  Negative for difficulty urinating, dysuria, flank pain and frequency.   Musculoskeletal:  Positive for arthralgias. Negative for back pain, gait problem, joint swelling, myalgias, neck pain and neck stiffness.   Skin:  Negative for rash.   Neurological:  Negative for dizziness, facial asymmetry, weakness, light-headedness, numbness and headaches.   Hematological:  Negative for adenopathy. Does not bruise/bleed easily.   Psychiatric/Behavioral:  Negative for agitation, behavioral problems and confusion.          Physical Exam   Physical Exam  Vitals and nursing note reviewed.   Constitutional:       General: He is not in acute distress.     Appearance: Normal appearance. He is not ill-appearing, toxic-appearing or diaphoretic.   HENT:      Head: Normocephalic and

## 2024-05-01 NOTE — PROGRESS NOTES
first metatarsal phalangeal joint.  IMPRESSION:      ICD-10-CM    1. Displaced fracture of distal phalanx of left great toe, initial encounter for open fracture  S92.422B DME Order for (Specify) as OP     [facility] FX GRT TOE PHLX/PHLG W/O MANJ      2. Polio  A80.9       3. Seizure disorder (HCC)  G40.909       4. Debility  R53.81 DME Order for (Specify) as OP        PLAN:  There is no need for surgery at this time.  Wound cleaned and new bulky sterile dressing applied. Post-op wooden shoe provided.  Will follow closely.  He will follow up on 5/6/24.     Documentation by marissa Garcia, as documented by Ciaran Guidry MD.

## 2024-05-02 ENCOUNTER — OFFICE VISIT (OUTPATIENT)
Age: 74
End: 2024-05-02

## 2024-05-02 VITALS — WEIGHT: 135 LBS | HEIGHT: 69 IN | BODY MASS INDEX: 19.99 KG/M2 | TEMPERATURE: 97.7 F

## 2024-05-02 DIAGNOSIS — G40.909 SEIZURE DISORDER (HCC): ICD-10-CM

## 2024-05-02 DIAGNOSIS — R53.81 DEBILITY: ICD-10-CM

## 2024-05-02 DIAGNOSIS — A80.9 POLIO: ICD-10-CM

## 2024-05-02 DIAGNOSIS — S92.422B DISPLACED FRACTURE OF DISTAL PHALANX OF LEFT GREAT TOE, INITIAL ENCOUNTER FOR OPEN FRACTURE: Primary | ICD-10-CM

## 2024-05-06 ENCOUNTER — OFFICE VISIT (OUTPATIENT)
Age: 74
End: 2024-05-06
Payer: MEDICARE

## 2024-05-06 VITALS — BODY MASS INDEX: 19.11 KG/M2 | WEIGHT: 129 LBS | TEMPERATURE: 98.6 F | HEIGHT: 69 IN

## 2024-05-06 DIAGNOSIS — S92.422B DISPLACED FRACTURE OF DISTAL PHALANX OF LEFT GREAT TOE, INITIAL ENCOUNTER FOR OPEN FRACTURE: Primary | ICD-10-CM

## 2024-05-06 PROCEDURE — 73630 X-RAY EXAM OF FOOT: CPT | Performed by: PHYSICIAN ASSISTANT

## 2024-05-06 PROCEDURE — 99024 POSTOP FOLLOW-UP VISIT: CPT | Performed by: PHYSICIAN ASSISTANT

## 2024-05-06 NOTE — PROGRESS NOTES
Partial epilepsy (HCC)    Congenital hemiparesis (HCC)    Epidermoid cyst of skin of chest    Femur fracture, right (HCC)    Cerebral palsy (HCC)    Right hemiparesis (HCC)    Chronic obstructive pulmonary disease, unspecified       Social History     Tobacco Use    Smoking status: Former     Current packs/day: 0.00     Types: Cigarettes     Quit date: 2016     Years since quittin.9     Passive exposure: Never    Smokeless tobacco: Former     Quit date: 2016   Vaping Use    Vaping Use: Never used   Substance Use Topics    Alcohol use: No    Drug use: No        No Known Allergies     Current Outpatient Medications   Medication Sig    acetaminophen (TYLENOL) 500 MG tablet Take 2 tablets by mouth every 6 hours as needed for Pain    sildenafil (VIAGRA) 25 MG tablet Take 1 tablet by mouth as needed for Erectile Dysfunction    carBAMazepine (TEGRETOL XR) 400 MG extended release tablet Take 1 tablet by mouth 2 times daily    pravastatin (PRAVACHOL) 10 MG tablet Take 1 tablet by mouth daily    famotidine (PEPCID) 20 MG tablet Take 1 tablet by mouth 2 times daily    ferrous sulfate (IRON 325) 325 (65 Fe) MG tablet Take 1 tablet by mouth daily (with breakfast)    cyanocobalamin 1000 MCG tablet Take 1 tablet by mouth daily    latanoprost (XALATAN) 0.005 % ophthalmic solution Place 1 drop into both eyes nightly     No current facility-administered medications for this visit.        PHYSICAL EXAMINATION:  Temp 98.6 °F (37 °C) (Temporal)   Ht 1.753 m (5' 9\")   Wt 58.5 kg (129 lb)   BMI 19.05 kg/m²      ORTHO EXAMINATION:    Examination Left Ankle/Foot Right Ankle/Foot   Skin Intact Intact   Swelling ++ Great toe -   Dorsiflexion Not tested due to fracture 10   Plantarflexion \" 25   Deformity - -   Inversion laxity - -   Anterior drawer - -   Medial tenderness - -   Lateral tenderness - -   Heel cord Intact Intact   Sensation Intact Intact   Bunion - -   Toe nails Laceration base great toenail Normal   Capillary

## 2024-05-20 ENCOUNTER — OFFICE VISIT (OUTPATIENT)
Age: 74
End: 2024-05-20
Payer: MEDICARE

## 2024-05-20 VITALS — TEMPERATURE: 99.1 F | WEIGHT: 120 LBS | HEIGHT: 69 IN | BODY MASS INDEX: 17.77 KG/M2

## 2024-05-20 DIAGNOSIS — S92.422B DISPLACED FRACTURE OF DISTAL PHALANX OF LEFT GREAT TOE, INITIAL ENCOUNTER FOR OPEN FRACTURE: Primary | ICD-10-CM

## 2024-05-20 PROCEDURE — 99024 POSTOP FOLLOW-UP VISIT: CPT | Performed by: PHYSICIAN ASSISTANT

## 2024-05-20 PROCEDURE — 73630 X-RAY EXAM OF FOOT: CPT | Performed by: PHYSICIAN ASSISTANT

## 2024-05-20 NOTE — PROGRESS NOTES
Patient: Noble Delgado                MRN: [id number]       SSN: [id number]  YOB: 1950        AGE: 73 y.o.        SEX: male      PCP: Komal Flores APRN - [facility]  05/20/24 5/20/2024: Patient returns the office for repeat imaging of his left foot.    5/6/2024: Patient returns the office for repeat imaging of his left foot.  He has a known fracture to the proximal phalanx of the left great toe and a laceration which was treated using interrupted nylon sutures.  He is walking 50% weightbearing left lower extremity using a cane and fracture/surgical shoe on the left foot.  His pain is controlled with Tylenol.    Chief Complaint   Patient presents with    Foot Pain     Left great toe       HISTORY:  Noble Delgado is a 73 y.o. male who is seen for left great toe injury on 4/29/24. His big toe caught on the carpet when he slipped and fell at home. He was seen at [facility] ED on 4/29/24 where xrays revealed a minimally displaced compound fracture of the left great toe distal phalanx.  His dorsal wound at the base of his toenail was copiously irrigated and loosely closed with sutures.  He was given IV Ancef and a tetanus toxoid injection.  A bulky dressing was applied and he was provided with a short fracture walker.     He was previously seen by DORYS Godinez for right hip pain.     He has a h/o polio and seizure disorder.     Occupation, etc: Mr. Delgado is retired. He previously worked as a  for [employer]. He lives alone in [place]. His niece Lucina takes care of him. He has 1 adult son and grandchildren living in [place]. He weighs 135 lbs and is 5'9\". He walks 8 blocks for exercise every morning.   Wt Readings from Last 3 Encounters:   05/20/24 54.4 kg (120 lb)   05/06/24 58.5 kg (129 lb)   05/02/24 61.2 kg (135 lb)      Body mass index is 17.72 kg/m².    Patient Active Problem List   Diagnosis    Tegretol toxicity, accidental or unintentional, initial

## 2024-06-03 ENCOUNTER — OFFICE VISIT (OUTPATIENT)
Age: 74
End: 2024-06-03
Payer: MEDICARE

## 2024-06-03 VITALS — BODY MASS INDEX: 17.72 KG/M2 | HEIGHT: 69 IN | RESPIRATION RATE: 16 BRPM | TEMPERATURE: 97.7 F

## 2024-06-03 DIAGNOSIS — S92.422D OPEN DISPLACED FRACTURE OF DISTAL PHALANX OF LEFT GREAT TOE WITH ROUTINE HEALING, SUBSEQUENT ENCOUNTER: Primary | ICD-10-CM

## 2024-06-03 PROCEDURE — 73630 X-RAY EXAM OF FOOT: CPT | Performed by: PHYSICIAN ASSISTANT

## 2024-06-03 PROCEDURE — 99024 POSTOP FOLLOW-UP VISIT: CPT | Performed by: PHYSICIAN ASSISTANT

## 2024-06-03 NOTE — PROGRESS NOTES
from Last 3 Encounters:   24 54.4 kg (120 lb)   24 58.5 kg (129 lb)   24 61.2 kg (135 lb)      Body mass index is 17.72 kg/m².    Patient Active Problem List   Diagnosis    Tegretol toxicity, accidental or unintentional, initial encounter    Closed displaced intertrochanteric fracture of right femur (HCC)    B12 deficiency    Partial epilepsy (HCC)    Congenital hemiparesis (HCC)    Epidermoid cyst of skin of chest    Femur fracture, right (HCC)    Cerebral palsy (HCC)    Right hemiparesis (HCC)    Chronic obstructive pulmonary disease, unspecified       Social History     Tobacco Use    Smoking status: Former     Current packs/day: 0.00     Types: Cigarettes     Quit date: 2016     Years since quittin.9     Passive exposure: Never    Smokeless tobacco: Former     Quit date: 2016   Vaping Use    Vaping Use: Never used   Substance Use Topics    Alcohol use: No    Drug use: No        No Known Allergies     Current Outpatient Medications   Medication Sig    acetaminophen (TYLENOL) 500 MG tablet Take 2 tablets by mouth every 6 hours as needed for Pain    sildenafil (VIAGRA) 25 MG tablet Take 1 tablet by mouth as needed for Erectile Dysfunction    carBAMazepine (TEGRETOL XR) 400 MG extended release tablet Take 1 tablet by mouth 2 times daily    pravastatin (PRAVACHOL) 10 MG tablet Take 1 tablet by mouth daily    famotidine (PEPCID) 20 MG tablet Take 1 tablet by mouth 2 times daily    ferrous sulfate (IRON 325) 325 (65 Fe) MG tablet Take 1 tablet by mouth daily (with breakfast)    cyanocobalamin 1000 MCG tablet Take 1 tablet by mouth daily    latanoprost (XALATAN) 0.005 % ophthalmic solution Place 1 drop into both eyes nightly     No current facility-administered medications for this visit.        PHYSICAL EXAMINATION:  Temp 99.1 °F (37.3 °C) (Temporal)   Ht 1.753 m (5' 9\")   Wt 54.4 kg (120 lb)   BMI 17.72 kg/m²      ORTHO EXAMINATION:    Examination Left Ankle/Foot Right Ankle/Foot

## 2024-08-05 DIAGNOSIS — G40.109 PARTIAL EPILEPSY (HCC): ICD-10-CM

## 2024-08-06 RX ORDER — CARBAMAZEPINE 400 MG/1
400 TABLET, EXTENDED RELEASE ORAL 2 TIMES DAILY
Qty: 60 TABLET | Refills: 2 | Status: SHIPPED | OUTPATIENT
Start: 2024-08-06

## 2024-09-20 ENCOUNTER — OFFICE VISIT (OUTPATIENT)
Facility: CLINIC | Age: 74
End: 2024-09-20

## 2024-09-20 VITALS
SYSTOLIC BLOOD PRESSURE: 139 MMHG | BODY MASS INDEX: 19.26 KG/M2 | WEIGHT: 130 LBS | HEART RATE: 65 BPM | OXYGEN SATURATION: 98 % | DIASTOLIC BLOOD PRESSURE: 79 MMHG | TEMPERATURE: 97.9 F | RESPIRATION RATE: 16 BRPM | HEIGHT: 69 IN

## 2024-09-20 DIAGNOSIS — M25.551 CHRONIC RIGHT HIP PAIN: ICD-10-CM

## 2024-09-20 DIAGNOSIS — J44.9 CHRONIC OBSTRUCTIVE PULMONARY DISEASE, UNSPECIFIED COPD TYPE (HCC): ICD-10-CM

## 2024-09-20 DIAGNOSIS — Z00.00 MEDICARE ANNUAL WELLNESS VISIT, SUBSEQUENT: ICD-10-CM

## 2024-09-20 DIAGNOSIS — M16.11 PRIMARY OSTEOARTHRITIS OF RIGHT HIP: ICD-10-CM

## 2024-09-20 DIAGNOSIS — Z00.00 ENCOUNTER FOR SUBSEQUENT ANNUAL WELLNESS VISIT (AWV) IN MEDICARE PATIENT: ICD-10-CM

## 2024-09-20 DIAGNOSIS — N52.9 ERECTILE DYSFUNCTION, UNSPECIFIED ERECTILE DYSFUNCTION TYPE: ICD-10-CM

## 2024-09-20 DIAGNOSIS — Z12.5 PROSTATE CANCER SCREENING: ICD-10-CM

## 2024-09-20 DIAGNOSIS — G89.29 CHRONIC RIGHT HIP PAIN: ICD-10-CM

## 2024-09-20 DIAGNOSIS — Z23 NEED FOR VACCINATION: ICD-10-CM

## 2024-09-20 DIAGNOSIS — K21.9 GASTROESOPHAGEAL REFLUX DISEASE, UNSPECIFIED WHETHER ESOPHAGITIS PRESENT: ICD-10-CM

## 2024-09-20 DIAGNOSIS — R60.0 EDEMA OF RIGHT LOWER EXTREMITY: ICD-10-CM

## 2024-09-20 DIAGNOSIS — E78.5 HYPERLIPIDEMIA, UNSPECIFIED HYPERLIPIDEMIA TYPE: Primary | ICD-10-CM

## 2024-09-20 PROBLEM — S72.91XA FEMUR FRACTURE, RIGHT (HCC): Status: RESOLVED | Noted: 2019-07-20 | Resolved: 2024-09-20

## 2024-09-20 PROBLEM — H40.9 UNSPECIFIED GLAUCOMA: Status: ACTIVE | Noted: 2019-07-25

## 2024-09-20 PROBLEM — R26.2 DIFFICULTY IN WALKING, NOT ELSEWHERE CLASSIFIED: Status: ACTIVE | Noted: 2019-07-25

## 2024-09-20 PROBLEM — T42.1X1A: Status: RESOLVED | Noted: 2021-10-22 | Resolved: 2024-09-20

## 2024-09-20 PROBLEM — L72.0 EPIDERMOID CYST OF SKIN OF CHEST: Status: RESOLVED | Noted: 2022-02-04 | Resolved: 2024-09-20

## 2024-09-20 PROBLEM — S72.141A CLOSED DISPLACED INTERTROCHANTERIC FRACTURE OF RIGHT FEMUR (HCC): Status: RESOLVED | Noted: 2019-07-21 | Resolved: 2024-09-20

## 2024-09-20 PROBLEM — M62.81 MUSCLE WEAKNESS (GENERALIZED): Status: ACTIVE | Noted: 2019-07-25

## 2024-09-20 RX ORDER — SILDENAFIL 25 MG/1
25 TABLET, FILM COATED ORAL PRN
Qty: 30 TABLET | Refills: 0 | Status: SHIPPED | OUTPATIENT
Start: 2024-09-20

## 2024-09-20 RX ORDER — ACETAMINOPHEN AND CODEINE PHOSPHATE 300; 15 MG/1; MG/1
1 TABLET ORAL DAILY PRN
Qty: 7 TABLET | Refills: 0 | Status: SHIPPED | OUTPATIENT
Start: 2024-09-20 | End: 2024-09-27

## 2024-09-20 RX ORDER — FERROUS SULFATE 325(65) MG
325 TABLET ORAL
Qty: 90 TABLET | Refills: 3 | Status: SHIPPED | OUTPATIENT
Start: 2024-09-20

## 2024-09-20 RX ORDER — PRAVASTATIN SODIUM 10 MG
10 TABLET ORAL DAILY
Qty: 90 TABLET | Refills: 3 | Status: SHIPPED | OUTPATIENT
Start: 2024-09-20

## 2024-09-20 RX ORDER — FAMOTIDINE 20 MG/1
20 TABLET, FILM COATED ORAL 2 TIMES DAILY
Qty: 180 TABLET | Refills: 3 | Status: SHIPPED | OUTPATIENT
Start: 2024-09-20

## 2024-09-20 SDOH — ECONOMIC STABILITY: FOOD INSECURITY: WITHIN THE PAST 12 MONTHS, YOU WORRIED THAT YOUR FOOD WOULD RUN OUT BEFORE YOU GOT MONEY TO BUY MORE.: NEVER TRUE

## 2024-09-20 SDOH — ECONOMIC STABILITY: INCOME INSECURITY: HOW HARD IS IT FOR YOU TO PAY FOR THE VERY BASICS LIKE FOOD, HOUSING, MEDICAL CARE, AND HEATING?: NOT HARD AT ALL

## 2024-09-20 SDOH — ECONOMIC STABILITY: FOOD INSECURITY: WITHIN THE PAST 12 MONTHS, THE FOOD YOU BOUGHT JUST DIDN'T LAST AND YOU DIDN'T HAVE MONEY TO GET MORE.: NEVER TRUE

## 2024-09-20 ASSESSMENT — PATIENT HEALTH QUESTIONNAIRE - PHQ9
SUM OF ALL RESPONSES TO PHQ9 QUESTIONS 1 & 2: 0
SUM OF ALL RESPONSES TO PHQ QUESTIONS 1-9: 0
1. LITTLE INTEREST OR PLEASURE IN DOING THINGS: NOT AT ALL
SUM OF ALL RESPONSES TO PHQ QUESTIONS 1-9: 0
2. FEELING DOWN, DEPRESSED OR HOPELESS: NOT AT ALL
SUM OF ALL RESPONSES TO PHQ QUESTIONS 1-9: 0
SUM OF ALL RESPONSES TO PHQ QUESTIONS 1-9: 0

## 2024-09-20 ASSESSMENT — ENCOUNTER SYMPTOMS
ABDOMINAL PAIN: 0
COUGH: 0
SHORTNESS OF BREATH: 0
NAUSEA: 0
CHEST TIGHTNESS: 0
VOMITING: 0

## 2024-10-28 ENCOUNTER — OFFICE VISIT (OUTPATIENT)
Age: 74
End: 2024-10-28

## 2024-10-28 VITALS
WEIGHT: 128 LBS | OXYGEN SATURATION: 98 % | HEART RATE: 62 BPM | BODY MASS INDEX: 18.96 KG/M2 | TEMPERATURE: 97.4 F | HEIGHT: 69 IN | SYSTOLIC BLOOD PRESSURE: 116 MMHG | DIASTOLIC BLOOD PRESSURE: 62 MMHG

## 2024-10-28 DIAGNOSIS — G40.109 PARTIAL EPILEPSY (HCC): Primary | ICD-10-CM

## 2024-10-28 RX ORDER — CARBAMAZEPINE 400 MG/1
400 TABLET, EXTENDED RELEASE ORAL 2 TIMES DAILY
Qty: 180 TABLET | Refills: 3 | Status: SHIPPED | OUTPATIENT
Start: 2024-10-28

## 2024-10-28 RX ORDER — CHOLECALCIFEROL (VITAMIN D3) 25 MCG
1000 TABLET ORAL DAILY
Qty: 90 TABLET | Refills: 3 | Status: SHIPPED | OUTPATIENT
Start: 2024-10-28

## 2024-10-28 NOTE — PATIENT INSTRUCTIONS
Patient instructions:  -continue Tegretol  mg twice daily  -lab test when you can-- you can have them drawn with next lab draw (checking carbamazepine level)  -vitamin D 1000 units daily supplement (will send rx but you may have to get it over the counter)

## 2024-10-28 NOTE — PROGRESS NOTES
[facility]  [address], [place] [address]  Office:  [phone]  Fax: [phone]  Chief Complaint   Patient presents with    Follow-up     Partial epilepsy (       HPI: Noble Delgado presents in follow-up for epilepsy.  He has a history of seizures since childhood and has history of polio affecting the right side of his body.  He was last seen here by me on 10/14/2022.  He continued on a regimen of Tegretol- mg twice daily.  Endorsed adherence.  He uses a pill container and his niece fills it for him.  Denies alcohol use.  Ambulates with a cane for balance.  Last vitamin D level at the time was 31.9 on 1/23/2020.  He takes a multivitamin.  He was last seen here on 10/16/2023 by Dr. Maki.  It was noted that he has not had any breakthrough seizures since prior to last visit.  He takes Tegretol and denied side effects on the medication.  It was noted that he had lab work in September 2023 and had an unremarkable CBC and serum chemistries.      He presents today in follow-up.  He is doing ok.  Reports his right hip has screws in it, it aches when the weather gets cold.  He puts a cream on it.  States other than that, he's doing alright.  In terms of seizures, he says he had one last month, it just came on him, he was in the bed at night.  Endorses it was in his sleep.  Does not have urine incontinence or tongue biting.  Knew it was a seizure because the left side draws up.  He's suffered from this since he was a baby.  Endorses he was taking the Tegretol ok; takes it at 8 and 8.  His niece helps him; she sets it up.  He denies missed medication.  He denies illness around that time.  Seizure frequency: it comes unexpectedly, maybe about 2 in a year.  States the seizures are controlled on the medication but doesn't stop it.  The seizure frequency hasn't changed.  Sleeps well.  Endorses he is still taking a multivitamin.    CMP in Dec. 2023 unremarkable and CBC normal.

## 2025-03-14 ENCOUNTER — HOSPITAL ENCOUNTER (OUTPATIENT)
Facility: HOSPITAL | Age: 75
Setting detail: SPECIMEN
Discharge: HOME | End: 2025-03-17
Payer: MEDICARE

## 2025-03-14 LAB
ALBUMIN SERPL-MCNC: 4.2 G/DL (ref 3.4–5)
ALBUMIN/GLOB SERPL: 1.6 (ref 0.8–1.7)
ALP SERPL-CCNC: 75 U/L (ref 45–117)
ALT SERPL-CCNC: 24 U/L (ref 16–61)
ANION GAP SERPL CALC-SCNC: 5 MMOL/L (ref 3–18)
AST SERPL-CCNC: 19 U/L (ref 10–38)
BILIRUB SERPL-MCNC: 0.5 MG/DL (ref 0.2–1)
BUN SERPL-MCNC: 12 MG/DL (ref 7–18)
BUN/CREAT SERPL: 13 (ref 12–20)
CALCIUM SERPL-MCNC: 9.6 MG/DL (ref 8.5–10.1)
CHLORIDE SERPL-SCNC: 104 MMOL/L (ref 100–111)
CHOLEST SERPL-MCNC: 213 MG/DL
CO2 SERPL-SCNC: 30 MMOL/L (ref 21–32)
CREAT SERPL-MCNC: 0.96 MG/DL (ref 0.6–1.3)
GLOBULIN SER CALC-MCNC: 2.6 G/DL (ref 2–4)
GLUCOSE SERPL-MCNC: 71 MG/DL (ref 74–99)
HDLC SERPL-MCNC: 132 MG/DL (ref 40–60)
HDLC SERPL: 1.6 (ref 0–5)
LDLC SERPL CALC-MCNC: 68.8 MG/DL (ref 0–100)
LIPID PANEL: ABNORMAL
POTASSIUM SERPL-SCNC: 4.8 MMOL/L (ref 3.5–5.5)
PROT SERPL-MCNC: 6.8 G/DL (ref 6.4–8.2)
PSA SERPL-MCNC: 1.4 NG/ML (ref 0–4)
SODIUM SERPL-SCNC: 139 MMOL/L (ref 136–145)
TRIGL SERPL-MCNC: 61 MG/DL
VLDLC SERPL CALC-MCNC: 12.2 MG/DL

## 2025-03-14 PROCEDURE — G0103 PSA SCREENING: HCPCS

## 2025-03-14 PROCEDURE — 80061 LIPID PANEL: CPT

## 2025-03-14 PROCEDURE — 36415 COLL VENOUS BLD VENIPUNCTURE: CPT

## 2025-03-14 PROCEDURE — 80053 COMPREHEN METABOLIC PANEL: CPT

## 2025-03-21 ENCOUNTER — OFFICE VISIT (OUTPATIENT)
Facility: CLINIC | Age: 75
End: 2025-03-21
Payer: MEDICARE

## 2025-03-21 VITALS
TEMPERATURE: 97.5 F | DIASTOLIC BLOOD PRESSURE: 72 MMHG | WEIGHT: 128 LBS | HEIGHT: 69 IN | OXYGEN SATURATION: 92 % | BODY MASS INDEX: 18.96 KG/M2 | SYSTOLIC BLOOD PRESSURE: 127 MMHG | HEART RATE: 82 BPM

## 2025-03-21 DIAGNOSIS — N52.9 ERECTILE DYSFUNCTION, UNSPECIFIED ERECTILE DYSFUNCTION TYPE: ICD-10-CM

## 2025-03-21 DIAGNOSIS — J44.9 CHRONIC OBSTRUCTIVE PULMONARY DISEASE, UNSPECIFIED COPD TYPE (HCC): Chronic | ICD-10-CM

## 2025-03-21 DIAGNOSIS — G40.109 PARTIAL EPILEPSY (HCC): ICD-10-CM

## 2025-03-21 DIAGNOSIS — G80.9 CEREBRAL PALSY, UNSPECIFIED TYPE (HCC): ICD-10-CM

## 2025-03-21 DIAGNOSIS — E78.5 HYPERLIPIDEMIA, UNSPECIFIED HYPERLIPIDEMIA TYPE: Primary | ICD-10-CM

## 2025-03-21 PROCEDURE — 1036F TOBACCO NON-USER: CPT | Performed by: NURSE PRACTITIONER

## 2025-03-21 PROCEDURE — 1159F MED LIST DOCD IN RCRD: CPT | Performed by: NURSE PRACTITIONER

## 2025-03-21 PROCEDURE — G8427 DOCREV CUR MEDS BY ELIG CLIN: HCPCS | Performed by: NURSE PRACTITIONER

## 2025-03-21 PROCEDURE — 1123F ACP DISCUSS/DSCN MKR DOCD: CPT | Performed by: NURSE PRACTITIONER

## 2025-03-21 PROCEDURE — 3023F SPIROM DOC REV: CPT | Performed by: NURSE PRACTITIONER

## 2025-03-21 PROCEDURE — G8420 CALC BMI NORM PARAMETERS: HCPCS | Performed by: NURSE PRACTITIONER

## 2025-03-21 PROCEDURE — 99214 OFFICE O/P EST MOD 30 MIN: CPT | Performed by: NURSE PRACTITIONER

## 2025-03-21 RX ORDER — BRIMONIDINE TARTRATE, TIMOLOL MALEATE 2; 5 MG/ML; MG/ML
1 SOLUTION/ DROPS OPHTHALMIC EVERY 12 HOURS
COMMUNITY

## 2025-03-21 RX ORDER — SILDENAFIL 25 MG/1
25 TABLET, FILM COATED ORAL PRN
Qty: 9 TABLET | Refills: 0 | Status: SHIPPED | OUTPATIENT
Start: 2025-03-21

## 2025-03-21 SDOH — ECONOMIC STABILITY: FOOD INSECURITY: WITHIN THE PAST 12 MONTHS, YOU WORRIED THAT YOUR FOOD WOULD RUN OUT BEFORE YOU GOT MONEY TO BUY MORE.: NEVER TRUE

## 2025-03-21 SDOH — ECONOMIC STABILITY: FOOD INSECURITY: WITHIN THE PAST 12 MONTHS, THE FOOD YOU BOUGHT JUST DIDN'T LAST AND YOU DIDN'T HAVE MONEY TO GET MORE.: NEVER TRUE

## 2025-03-21 ASSESSMENT — PATIENT HEALTH QUESTIONNAIRE - PHQ9
SUM OF ALL RESPONSES TO PHQ QUESTIONS 1-9: 0
SUM OF ALL RESPONSES TO PHQ QUESTIONS 1-9: 0
1. LITTLE INTEREST OR PLEASURE IN DOING THINGS: NOT AT ALL
SUM OF ALL RESPONSES TO PHQ QUESTIONS 1-9: 0
2. FEELING DOWN, DEPRESSED OR HOPELESS: NOT AT ALL
SUM OF ALL RESPONSES TO PHQ QUESTIONS 1-9: 0

## 2025-03-21 NOTE — PROGRESS NOTES
\"Have you been to the ER, urgent care clinic since your last visit?  Hospitalized since your last visit?\"    NO    “Have you seen or consulted any other health care providers outside our system since your last visit?”    NO       
Take 1 tablet by mouth daily Yes Automatic Reconciliation, Ar   latanoprost (XALATAN) 0.005 % ophthalmic solution Place 1 drop into both eyes nightly Yes Automatic Reconciliation, Ar        Social History     Tobacco Use    Smoking status: Former     Current packs/day: 0.00     Types: Cigarettes     Quit date: 2016     Years since quittin.8     Passive exposure: Never    Smokeless tobacco: Former     Quit date: 2016   Substance Use Topics    Alcohol use: No        /72 (BP Site: Left Upper Arm, Patient Position: Sitting)   Pulse 82   Temp 97.5 °F (36.4 °C)   Ht 1.753 m (5' 9\")   Wt 58.1 kg (128 lb)   SpO2 92%   BMI 18.90 kg/m²   Estimated body mass index is 18.9 kg/m² as calculated from the following:    Height as of this encounter: 1.753 m (5' 9\").    Weight as of this encounter: 58.1 kg (128 lb).  Past Medical History:   Diagnosis Date    Arthritis     Glaucoma     History of abdominal paracentesis     History of poliomyelitis     Hyperlipidemia     Seizures (HCC)      Past Surgical History:   Procedure Laterality Date    APPENDECTOMY      BIOPSY  2022    Excisional biopsy chest wall    COLONOSCOPY N/A 2019    COLONOSCOPY w bx's performed by Bhaskar Mujica MD at [facility] ENDOSCOPY    HIP SURGERY  2019     History reviewed. No pertinent family history.    Lab Results   Component Value Date    WBC 7.4 2023    HGB 15.0 2023    HCT 45.2 2023    MCV 93.4 2023     2023    LYMPHOPCT 47 2023    RBC 4.84 2023    MCH 31.0 2023    MCHC 33.2 2023    RDW 11.9 2023     Lab Results   Component Value Date     2025    K 4.8 2025     2025    CO2 30 2025    BUN 12 2025    CREATININE 0.96 2025    GLUCOSE 71 (L) 2025    CALCIUM 9.6 2025    BILITOT 0.5 2025    ALKPHOS 75 2025    AST 19 2025    ALT 24 2025    LABGLOM 82 2025    GFRAA >60 05/10/2022

## 2025-09-04 ENCOUNTER — APPOINTMENT (OUTPATIENT)
Facility: HOSPITAL | Age: 75
End: 2025-09-04
Payer: MEDICARE

## 2025-09-04 ENCOUNTER — HOSPITAL ENCOUNTER (EMERGENCY)
Facility: HOSPITAL | Age: 75
Discharge: HOME | End: 2025-09-04
Payer: MEDICARE

## 2025-09-04 VITALS
SYSTOLIC BLOOD PRESSURE: 145 MMHG | RESPIRATION RATE: 18 BRPM | HEIGHT: 69 IN | HEART RATE: 71 BPM | OXYGEN SATURATION: 99 % | BODY MASS INDEX: 18.96 KG/M2 | WEIGHT: 128 LBS | TEMPERATURE: 98.6 F | DIASTOLIC BLOOD PRESSURE: 82 MMHG

## 2025-09-04 DIAGNOSIS — S66.312A: Primary | ICD-10-CM

## 2025-09-04 PROCEDURE — 73140 X-RAY EXAM OF FINGER(S): CPT

## 2025-09-04 RX ORDER — MELOXICAM 7.5 MG/1
7.5 TABLET ORAL DAILY PRN
Qty: 15 TABLET | Refills: 0 | Status: SHIPPED | OUTPATIENT
Start: 2025-09-04

## 2025-09-04 ASSESSMENT — PAIN - FUNCTIONAL ASSESSMENT: PAIN_FUNCTIONAL_ASSESSMENT: 0-10

## 2025-09-04 ASSESSMENT — PAIN DESCRIPTION - LOCATION: LOCATION: FINGER (COMMENT WHICH ONE)

## 2025-09-04 ASSESSMENT — PAIN SCALES - GENERAL: PAINLEVEL_OUTOF10: 6

## 2025-09-04 ASSESSMENT — PAIN DESCRIPTION - ORIENTATION: ORIENTATION: RIGHT

## 2025-09-04 ASSESSMENT — PAIN DESCRIPTION - DESCRIPTORS: DESCRIPTORS: ACHING

## 2025-09-05 ASSESSMENT — ENCOUNTER SYMPTOMS
COLOR CHANGE: 0
BACK PAIN: 0

## (undated) DEVICE — GLOVE SURG SZ 8 L11.77IN FNGR THK9.8MIL STRW LTX POLYMER

## (undated) DEVICE — ENDOSCOPY PUMP TUBING/ CAP SET: Brand: ERBE

## (undated) DEVICE — DRESSING,GAUZE,XEROFORM,CURAD,1"X8",ST: Brand: CURAD

## (undated) DEVICE — KIT CLN UP BON SECOURS MARYV

## (undated) DEVICE — COVER LT HNDL BLU STRL -- MEDICHOICE

## (undated) DEVICE — 4-PORT MANIFOLD: Brand: NEPTUNE 2

## (undated) DEVICE — (D)PREP SKN CHLRAPRP APPL 26ML -- CONVERT TO ITEM 371833

## (undated) DEVICE — SUTURE VCRL SZ 3-0 L27IN ABSRB UD L26MM SH 1/2 CIR J416H

## (undated) DEVICE — BIT DRL L330MM DIA4.2MM CALIB 100MM 3 FLUT QUIK CPL

## (undated) DEVICE — STERILE POLYISOPRENE POWDER-FREE SURGICAL GLOVES: Brand: PROTEXIS

## (undated) DEVICE — GARMENT,MEDLINE,DVT,INT,CALF,MED, GEN2: Brand: MEDLINE

## (undated) DEVICE — SUTURE PROL SZ 2-0 L30IN NONABSORBABLE BLU L36MM FSLX 3/8 8689H

## (undated) DEVICE — WATERPROOF, BACTERIA PROOF DRESSING WITH ABSORBENT SEE THROUGH PAD: Brand: OPSITE POST-OP VISIBLE 20X10CM CTN 20

## (undated) DEVICE — INTENDED FOR TISSUE SEPARATION, AND OTHER PROCEDURES THAT REQUIRE A SHARP SURGICAL BLADE TO PUNCTURE OR CUT.: Brand: BARD-PARKER SAFETY BLADES SIZE 10, STERILE

## (undated) DEVICE — GOWN ISOL IMPERV UNIV, DISP, OPEN BACK, BLUE --

## (undated) DEVICE — GAUZE SPONGES,16 PLY: Brand: CURITY

## (undated) DEVICE — (D)GLOVE EXAM LG NITRL NS -- DISC BY MFR NO SUB

## (undated) DEVICE — SMOKE EVACUATION PENCIL: Brand: VALLEYLAB

## (undated) DEVICE — BLANKET WRM AD W50XL85.8IN PACU FULL BODY FORC AIR

## (undated) DEVICE — Device

## (undated) DEVICE — 3M™ STERI-DRAPE™ U-DRAPE 1015: Brand: STERI-DRAPE™

## (undated) DEVICE — PACK PROCEDURE SURG MAJ W/ BASIN LF

## (undated) DEVICE — SOLUTION IRRIG 1000ML H2O STRL BLT

## (undated) DEVICE — SHEET, DRAPE, SPLIT, STERILE: Brand: MEDLINE

## (undated) DEVICE — 3M(TM) MEDIPORE(TM) +PAD SOFT CLOTH ADHESIVE WOUND DRESSING 3566: Brand: 3M™ MEDIPORE™

## (undated) DEVICE — SUTURE VCRL SZ 0 L27IN ABSRB UD L36MM CT-1 1/2 CIR J260H

## (undated) DEVICE — TAPE,CLOTH/SILK,CURAD,3"X10YD,LF,40/CS: Brand: CURAD

## (undated) DEVICE — 3.2MM GUIDE WIRE 400MM

## (undated) DEVICE — CANNULA ORIG TL CLR W FOAM CUSHIONS AND 14FT SUPL TB 3 CHN

## (undated) DEVICE — SYR 50ML SLIP TIP NSAF LF STRL --

## (undated) DEVICE — SUTURE ABSORBABLE BRAIDED 2-0 CT-1 27 IN UD VICRYL J259H

## (undated) DEVICE — WATERPROOF, BACTERIA PROOF DRESSING WITH ABSORBENT SEE THROUGH PAD: Brand: OPSITE POST-OP VISIBLE 15X10CM CTN 20

## (undated) DEVICE — DRAPE XR C ARM 41X74IN LF --

## (undated) DEVICE — SUTURE VCRL SZ 3-0 L27IN ABSRB UD L36MM CT-1 1/2 CIR J258H

## (undated) DEVICE — FLUFF AND POLYMER UNDERPAD,EXTRA HEAVY: Brand: WINGS

## (undated) DEVICE — MEDI-VAC NON-CONDUCTIVE SUCTION TUBING: Brand: CARDINAL HEALTH

## (undated) DEVICE — CATHETER SUCT TR FL TIP 14FR W/ O CTRL

## (undated) DEVICE — FORCEPS BX L240CM JAW DIA2.8MM L CAP W/ NDL MIC MESH TOOTH

## (undated) DEVICE — DRAPE TOWEL: Brand: CONVERTORS

## (undated) DEVICE — SOLUTION IV 1000ML 0.9% SOD CHL

## (undated) DEVICE — AIRLIFE™ NASAL OXYGEN CANNULA CURVED, NONFLARED TIP WITH 14 FOOT (4.3 M) CRUSH-RESISTANT TUBING, OVER-THE-EAR STYLE: Brand: AIRLIFE™

## (undated) DEVICE — SUTURE MCRYL SZ 4-0 L18IN ABSRB UD L19MM PS-2 3/8 CIR PRIM Y496G

## (undated) DEVICE — DRAPE,TOP,102X53,STERILE: Brand: MEDLINE

## (undated) DEVICE — SYRINGE MED 25GA 3ML L5/8IN SUBQ PLAS W/ DETACH NDL SFTY

## (undated) DEVICE — REM POLYHESIVE ADULT PATIENT RETURN ELECTRODE: Brand: VALLEYLAB

## (undated) DEVICE — SUTURE ETHLN SZ 2-0 L30IN NONABSORBABLE BLK L36MM PSLX 3/8 1697H

## (undated) DEVICE — FLEX ADVANTAGE 3000CC: Brand: FLEX ADVANTAGE

## (undated) DEVICE — SPONGE LAP 18X18IN STRL -- 5/PK

## (undated) DEVICE — 6617 IOBAN II PATIENT ISOLATION DRAPE 5/BX,4BX/CS: Brand: STERI-DRAPE™ IOBAN™ 2

## (undated) DEVICE — PREP SKN CHLRAPRP APL 26ML STR --

## (undated) DEVICE — SYRINGE MED 20ML STD CLR PLAS LUERLOCK TIP N CTRL DISP

## (undated) DEVICE — MEDI-VAC SUCTION HIGH CAPACITY: Brand: CARDINAL HEALTH

## (undated) DEVICE — SYR 10ML LUER LOK 1/5ML GRAD --